# Patient Record
Sex: FEMALE | Race: WHITE | NOT HISPANIC OR LATINO | Employment: OTHER | URBAN - METROPOLITAN AREA
[De-identification: names, ages, dates, MRNs, and addresses within clinical notes are randomized per-mention and may not be internally consistent; named-entity substitution may affect disease eponyms.]

---

## 2017-01-05 ENCOUNTER — ALLSCRIPTS OFFICE VISIT (OUTPATIENT)
Dept: OTHER | Facility: OTHER | Age: 62
End: 2017-01-05

## 2017-01-05 DIAGNOSIS — K21.9 GASTRO-ESOPHAGEAL REFLUX DISEASE WITHOUT ESOPHAGITIS: ICD-10-CM

## 2017-01-05 DIAGNOSIS — E78.5 HYPERLIPIDEMIA: ICD-10-CM

## 2017-01-05 DIAGNOSIS — I10 ESSENTIAL (PRIMARY) HYPERTENSION: ICD-10-CM

## 2017-07-06 ENCOUNTER — ALLSCRIPTS OFFICE VISIT (OUTPATIENT)
Dept: OTHER | Facility: OTHER | Age: 62
End: 2017-07-06

## 2017-08-01 ENCOUNTER — GENERIC CONVERSION - ENCOUNTER (OUTPATIENT)
Dept: OTHER | Facility: OTHER | Age: 62
End: 2017-08-01

## 2017-08-07 ENCOUNTER — GENERIC CONVERSION - ENCOUNTER (OUTPATIENT)
Dept: OTHER | Facility: OTHER | Age: 62
End: 2017-08-07

## 2017-08-16 ENCOUNTER — HOSPITAL ENCOUNTER (OUTPATIENT)
Dept: RADIOLOGY | Age: 62
Discharge: HOME | End: 2017-08-16
Payer: COMMERCIAL

## 2017-08-16 DIAGNOSIS — Z12.31 ENCOUNTER FOR SCREENING MAMMOGRAM FOR MALIGNANT NEOPLASM OF BREAST: ICD-10-CM

## 2017-08-16 PROCEDURE — G0202 SCR MAMMO BI INCL CAD: HCPCS

## 2017-08-23 ENCOUNTER — HOSPITAL ENCOUNTER (OUTPATIENT)
Dept: MAMMOGRAPHY | Facility: CLINIC | Age: 62
Discharge: HOME | End: 2017-08-23
Payer: COMMERCIAL

## 2017-08-23 DIAGNOSIS — R92.0 ABNORMAL FINDING ON MAMMOGRAPHY, MICROCALCIFICATION: ICD-10-CM

## 2017-08-23 PROCEDURE — G0206 DX MAMMO INCL CAD UNI: HCPCS

## 2017-08-24 ENCOUNTER — TRANSCRIBE ORDERS (OUTPATIENT)
Dept: ADMINISTRATIVE | Facility: HOSPITAL | Age: 62
End: 2017-08-24

## 2017-08-24 DIAGNOSIS — Z09 FOLLOW-UP EXAM, 3-6 MONTHS SINCE PREVIOUS EXAM: Primary | ICD-10-CM

## 2017-09-16 ENCOUNTER — ALLSCRIPTS OFFICE VISIT (OUTPATIENT)
Dept: OTHER | Facility: OTHER | Age: 62
End: 2017-09-16

## 2017-10-06 ENCOUNTER — HOSPITAL ENCOUNTER (OUTPATIENT)
Dept: RADIOLOGY | Facility: HOSPITAL | Age: 62
Discharge: HOME | End: 2017-10-06
Payer: COMMERCIAL

## 2017-10-06 ENCOUNTER — GENERIC CONVERSION - ENCOUNTER (OUTPATIENT)
Dept: OTHER | Facility: OTHER | Age: 62
End: 2017-10-06

## 2017-10-06 ENCOUNTER — TRANSCRIBE ORDERS (OUTPATIENT)
Dept: RADIOLOGY | Facility: HOSPITAL | Age: 62
End: 2017-10-06

## 2017-10-06 ENCOUNTER — APPOINTMENT (OUTPATIENT)
Dept: LAB | Facility: HOSPITAL | Age: 62
End: 2017-10-06
Payer: COMMERCIAL

## 2017-10-06 ENCOUNTER — ALLSCRIPTS OFFICE VISIT (OUTPATIENT)
Dept: OTHER | Facility: OTHER | Age: 62
End: 2017-10-06

## 2017-10-06 DIAGNOSIS — R11.0 NAUSEA: ICD-10-CM

## 2017-10-06 DIAGNOSIS — M54.9 DORSALGIA: ICD-10-CM

## 2017-10-06 DIAGNOSIS — R39.9 UNSPECIFIED SYMPTOMS AND SIGNS INVOLVING THE GENITOURINARY SYSTEM: ICD-10-CM

## 2017-10-06 LAB
BACTERIA UR QL AUTO: ABNORMAL /HPF
BILIRUB UR QL STRIP: NEGATIVE
BILIRUB UR QL STRIP: NORMAL
CLARITY UR: CLEAR
CLARITY UR: NORMAL
COLOR UR: YELLOW
COLOR UR: YELLOW
GLUCOSE (HISTORICAL): NORMAL
GLUCOSE UR STRIP-MCNC: NEGATIVE MG/DL
HGB UR QL STRIP.AUTO: NEGATIVE
HGB UR QL STRIP.AUTO: NORMAL
HYALINE CASTS #/AREA URNS LPF: ABNORMAL /LPF
KETONES UR STRIP-MCNC: NEGATIVE MG/DL
KETONES UR STRIP-MCNC: NORMAL MG/DL
LEUKOCYTE ESTERASE UR QL STRIP: NEGATIVE
LEUKOCYTE ESTERASE UR QL STRIP: NORMAL
NITRITE UR QL STRIP: NEGATIVE
NITRITE UR QL STRIP: NORMAL
NON-SQ EPI CELLS URNS QL MICRO: ABNORMAL /HPF
PH UR STRIP.AUTO: 0.7 [PH]
PH UR STRIP.AUTO: 7.5 [PH] (ref 4.5–8)
PROT UR STRIP-MCNC: NEGATIVE MG/DL
PROT UR STRIP-MCNC: NORMAL MG/DL
RBC #/AREA URNS AUTO: ABNORMAL /HPF
SP GR UR STRIP.AUTO: 1
SP GR UR STRIP.AUTO: 1.01 (ref 1–1.03)
UROBILINOGEN UR QL STRIP.AUTO: 0.2
UROBILINOGEN UR QL STRIP.AUTO: 0.2 E.U./DL
WBC #/AREA URNS AUTO: ABNORMAL /HPF

## 2017-10-06 PROCEDURE — 81001 URINALYSIS AUTO W/SCOPE: CPT

## 2017-10-06 PROCEDURE — 87086 URINE CULTURE/COLONY COUNT: CPT

## 2017-10-06 PROCEDURE — 72072 X-RAY EXAM THORAC SPINE 3VWS: CPT

## 2017-10-06 PROCEDURE — 71020 HB CHEST X-RAY 2VW FRONTAL&LATL: CPT

## 2017-10-06 PROCEDURE — 76705 ECHO EXAM OF ABDOMEN: CPT

## 2017-10-07 LAB — BACTERIA UR CULT: NORMAL

## 2017-10-09 ENCOUNTER — GENERIC CONVERSION - ENCOUNTER (OUTPATIENT)
Dept: OTHER | Facility: OTHER | Age: 62
End: 2017-10-09

## 2017-10-09 NOTE — PROGRESS NOTES
Assessment  1  Pain of mid back (724 5) (M54 9)   2  Nausea (787 02) (R11 0)    Plan   Pain of mid back    · Methocarbamol 750 MG Oral Tablet; TAKE 1 TABLET 3 times daily PRN muscle  spasm   · (1) URINALYSIS WITH MICROSCOPIC; Status:Resulted - Requires Verification;   Done:  04NSL0880 08:22PM   · (1) URINE CULTURE; Source:Urine, Clean Catch; Status:Resulted - Requires  Verification;   Done: 26IPL3924 08:22PM   · * XR CHEST PA & LATERAL; Status:Active; Requested for:06Oct2017;    · * XR SPINE THORACIC 3 VIEW; Status:Active; Requested for:06Oct2017;     US RIGHT UPPER QUADRANT; Status:Resulted - Requires Verification;   Done: 82UVT3645 12:00AM  Order Comments:PLEASE EVALUATE  RIGHT KIDNEY as well; Due:06Oct2017;Ordered; Stat;    For:Nausea, Pain of mid back; Ordered By:Helene Valdes; Discussion/Summary    Patient presents for evaluation of right mid back discomfort  are suspicious for mid back strain  pain is worse with deep breathing, sneezing, turning  is relieved by stretching  felt nauseated  History of acid reflux, fatty liver  Mild right flank tenderness and right upper quadrant discomfort on exam will proceed with stat right upper quadrant ultrasound along with UA C&S, chest x-ray and thoracic spine x-ray  rash on exam, I advised patient  to observe, cannot exclude possibility of shingles even though her symptoms started a week ago and no suspicious rashes so far  advised patient to continue the same dose of PPI  will try muscle relaxant for the next few days  pending updates and diagnostic results  The patient, patient's family was counseled regarding instructions for management,-impressions  Possible side effects of new medications were reviewed with the patient/guardian today  The treatment plan was reviewed with the patient/guardian  The patient/guardian understands and agrees with the treatment plan      Chief Complaint  Patient is here for acute   Patient has c/o back pain since 1 week ago  All medications were reviewed and updated today with the patient  History of Present Illness  HPI: developed mid back pain a week ago , no injury/fall or heavy lifting   Discomfort is worse with deep breathing   Tried extra acid reducer - no relief is better with standing and stretching   worse with sneezing , or taking deep breath  no SOB, no cough, no wheezing , no fever  no unusual abdominal s/o / no pain  Felt nauseated   occ  right flank discomfort   no dysuria, chronic urinary frequency , no gross hematuriarash      Back Pain, Thoracic, Acute (Brief): The patient is being seen for an initial evaluation of acute thoracic back pain  Symptoms:  pain on inspiration, but-no decreased range of motion,-no leg numbness,-no leg weakness-and-no lower extremity paresthesias-   The patient presents with complaints of gradual onset of intermittent episodes of moderate thoracic back pain  Episodes started 1 week ago  She is currently experiencing thoracic back pain  Symptoms are unchanged  The patient is currently experiencing symptoms  Associated symptoms:  malaise, but-no fever,-no abdominal pain,-no chest pain,-no weight loss,-no urinary incontinence,-no fecal incontinence,-no urinary retention,-no arm numbness,-no arm weakness,-no upper extremity paresthesias-and-no thoracic rash  Current treatment includes activity modification  Review of Systems    Constitutional: No fever, no chills, feels well, no tiredness, no recent weight gain or loss  ENT: no ear ache, no loss of hearing, no nosebleeds or nasal discharge, no sore throat or hoarseness  Cardiovascular: no complaints of slow or fast heart rate, no chest pain, no palpitations, no leg claudication or lower extremity edema  Respiratory: no complaints of shortness of breath, no wheezing, no dyspnea on exertion, no orthopnea or PND  Gastrointestinal: nausea  Genitourinary: as noted in HPI  Musculoskeletal: mid back pain     Integumentary: no complaints of skin rash or lesion, no itching or dry skin, no skin wounds  Neurological: no complaints of headache, no confusion, no numbness or tingling, no dizziness or fainting  Active Problems  1  Acid reflux disease (530 81) (K21 9)   2  Benign essential hypertension (401 1) (I10)   3  Bursitis (727 3) (M71 9)   4  Depression with anxiety (300 4) (F41 8)   5  Dermatitis (692 9) (L30 9)   6  Dyslipidemia (272 4) (E78 5)   7  Dyspepsia (536 8) (K30)   8  Fatigue (780 79) (R53 83)   9  Hematoma and contusion (924 9) (T14 8XXA)   10  Hiatal hernia (553 3) (K44 9)   11  History of allergy (V15 09) (Z88 9)   12  Hypokalemia (276 8) (E87 6)   13  Insomnia (780 52) (G47 00)   14  Irritable bowel syndrome (564 1) (K58 9)   15  Joint pain, knee (719 46) (M25 569)   16  Lateral epicondylitis, unspecified laterality (726 32) (M77 10)   17  Need for prophylactic vaccination and inoculation against influenza (V04 81) (Z23)   18  Pain in left ear (388 70) (H92 02)   19  Pain, upper back (724 5) (M54 9)   20  Right inguinal pain (789 09) (R10 31)   21  Seasonal allergies (477 9) (J30 2)   22  Sinusitis (473 9) (J32 9)   23  Symptomatic menopausal or female climacteric states (627 2) (N95 1)    Past Medical History  1  History of chest pain (V13 89) (Z87 898)   2  History of colonic polyps (V12 72) (Z86 010)   3  History of Visit for routine gyn exam (V72 31) (Z01 419)   4  History of Visit for screening mammogram (O92 32) (Z12 31)  Active Problems And Past Medical History Reviewed: The active problems and past medical history were reviewed and updated today  Family History  Mother    1  Family history of Diabetes Mellitus (V18 0)   2  Family history of Heart Disease (V17 49)   3  Family history of Osteoporosis (V17 81)  Father    4  Family history of Heart Disease (V17 49)  Family History    5  Family history of Cancer  Family History Reviewed: The family history was reviewed and updated today         Social History   · Being A Social Drinker   ·    · Never a smoker   · Never A Smoker  The social history was reviewed and updated today  Surgical History  1  History of Colonoscopy   2  History of Esophagogastric Fundoplasty Nissen Fundoplication   3  History of Foot Surgery  Surgical History Reviewed: The surgical history was reviewed and updated today  Current Meds   1  Citracal Plus Oral Tablet; Take 1 tablet twice daily; Therapy: (Recorded:91Khc0705) to Recorded   2  [employer] Stool Softener 100 MG Oral Capsule; TAKE 1 CAPSULE Daily; Therapy: (Recorded:85Afb1898) to Recorded   3  Estradiol 0 5 MG Oral Tablet; TAKE 1 TABLET DAILY; Therapy: (Recorded:52Piy7409) to Recorded   4  Flaxseed Oil 1200 MG Oral Capsule; take 1 capsule daily; Therapy: 75WVE2524 to Recorded   5  Fluticasone Propionate 50 MCG/ACT Nasal Suspension; USE 2 SPRAYS IN EACH   NOSTRIL ONCE DAILY  Requested for: 97UCN4957; Last Rx:08Jan2016 Ordered   6  Metoprolol Succinate ER 25 MG Oral Tablet Extended Release 24 Hour; Take 1 tablet   daily; Therapy: 43HYO5413 to (Evaluate:07Xwz1661)  Requested for: 73YDE6578; Last   Rx:05Jan2017 Ordered   7  Multiple Vitamin TABS; Take 1 tablet daily; Therapy: (Recorded:59Hhw3073) to Recorded   8  [facility] Colon Health Oral Capsule; Therapy: (Recorded:05Jan2017) to Recorded   9  Proctosol HC 2 5 % Rectal Cream; USE AS DIRECTED; Therapy: 60XUZ0061 to (Last Rx:04Oct2016)  Requested for: 04Oct2016 Ordered   10  Provera 2 5 MG Oral Tablet; Therapy: (Recorded:92Rzi8879) to Recorded   11  RABEprazole Sodium 20 MG Oral Tablet Delayed Release; TAKE ONE (1) TABLET(S)    DAILY    [facility] MFR;    Therapy: 17ELX2972 to (Last Rx:05Jan2017)  Requested for: 12TZZ2148 Ordered   12  Vitamin D 1000 UNIT Oral Tablet; Take 1 tablet daily; Therapy: (Recorded:37Bgd3198) to Recorded   13  Zinc 50 MG CAPS; Therapy: (Recorded:05Jan2017) to Recorded   14   Zolpidem Tartrate 10 MG Oral Tablet; take 1/2 or 1 tablet as needed at bedtime for    insomnia; Therapy: 78QLQ3829 to (Last Rx:48Hrf7752) Ordered    The medication list was reviewed and updated today  Allergies  1  ACE Inhibitors   2  Angiotensin Receptor Blockers    Vitals   Recorded: 06KBD6114 11:34AM   Temperature 98 2 F   Heart Rate 76   Respiration 18   Systolic 406   Diastolic 316   Height 5 ft 6 in   Weight 188 lb    BMI Calculated 30 34   BSA Calculated 1 95     Physical Exam    Constitutional   General appearance: No acute distress, well appearing and well nourished  Pulmonary   Respiratory effort: No increased work of breathing or signs of respiratory distress  Auscultation of lungs: Clear to auscultation  Cardiovascular   Auscultation of heart: Normal rate and rhythm, normal S1 and S2, without murmurs  Abdomen   Abdomen: Abnormal   The abdomen was rounded-and-obese  Bowel sounds were normal  There was mild tenderness in the right upper quadrant  The abdomen was not firm-and-not rigid  No guarding  right CVA tenderness -no abdominal bruits  Liver and spleen: No hepatomegaly or splenomegaly  Musculoskeletal   Gait and station: Normal     Neurologic   Cranial nerves: Cranial nerves 2-12 intact  Psychiatric   Orientation to person, place, and time: Normal     Mood and affect: Abnormal   Mood and Affect: anxious  Additional Exam:  trapezius spasm on the right          Results/Data  (1) URINALYSIS WITH MICROSCOPIC 06Oct2017 08:22PM Minor Pu Order Number: [id number]     Test Name Result Flag Reference   COLOR Yellow     CLARITY Clear     PH UA 7 5  4 5-8 0   LEUKOCYTE ESTERASE UA Negative  Negative   NITRITE UA Negative  Negative   PROTEIN UA Negative mg/dl  Negative   GLUCOSE UA Negative mg/dl  Negative   KETONES UA Negative mg/dl  Negative   UROBILINOGEN UA 0 2 E U /dl  0 2, 1 0 E U /dl   BILIRUBIN UA Negative  Negative   BLOOD UA Negative  Negative   SPECIFIC GRAVITY UA 1 009  1 003-1 030   WBC UA None Seen /hpf  None Seen, 0-5, 5-55, 5-65   RBC UA 10-20 /hpf A None Seen, 0-5   HYALINE CASTS None Seen /lpf  None Seen   BACTERIA None Seen /hpf  None Seen, Occasional   EPITHELIAL CELLS None Seen /hpf  None Seen, Occasional     (1) URINE CULTURE 06Oct2017 08:22PM Makenzie Terrell    Order Number: [id number]     Test Name Result Flag Reference   CLINICAL REPORT (Report)     Test:        Urine culture  Specimen Type:   Urine  Specimen Date:   10/6/2017 8:22 PM  Result Date:    10/7/2017 7:21 PM  Result Status:   Final result  Resulting Lab:   [facility] [address] [place] [address]            Tel: [phone]      CULTURE                                       ------------------                                   20,000-29,000 cfu/ml      *** Mixed Contaminants X3 ***       Signatures   Electronically signed by :  Shelle Halsted, M D ; Oct  8 2017 11:22AM EST                       (Author)

## 2017-10-10 ENCOUNTER — ALLSCRIPTS OFFICE VISIT (OUTPATIENT)
Dept: OTHER | Facility: OTHER | Age: 62
End: 2017-10-10

## 2017-10-10 LAB
BILIRUB UR QL STRIP: NORMAL
CLARITY UR: NORMAL
COLOR UR: YELLOW
GLUCOSE (HISTORICAL): NORMAL
HGB UR QL STRIP.AUTO: NORMAL
KETONES UR STRIP-MCNC: NORMAL MG/DL
LEUKOCYTE ESTERASE UR QL STRIP: NORMAL
NITRITE UR QL STRIP: NORMAL
PH UR STRIP.AUTO: 0.5 [PH]
PROT UR STRIP-MCNC: NORMAL MG/DL
SP GR UR STRIP.AUTO: 10.25
UROBILINOGEN UR QL STRIP.AUTO: 0.2

## 2017-10-11 ENCOUNTER — APPOINTMENT (OUTPATIENT)
Dept: LAB | Facility: HOSPITAL | Age: 62
End: 2017-10-11
Payer: COMMERCIAL

## 2017-10-11 DIAGNOSIS — R39.9 UNSPECIFIED SYMPTOMS AND SIGNS INVOLVING THE GENITOURINARY SYSTEM: ICD-10-CM

## 2017-10-11 LAB
BACTERIA UR QL AUTO: ABNORMAL /HPF
BILIRUB UR QL STRIP: NEGATIVE
CAOX CRY URNS QL MICRO: ABNORMAL /HPF
CLARITY UR: ABNORMAL
COLOR UR: YELLOW
GLUCOSE UR STRIP-MCNC: NEGATIVE MG/DL
HGB UR QL STRIP.AUTO: NEGATIVE
KETONES UR STRIP-MCNC: NEGATIVE MG/DL
LEUKOCYTE ESTERASE UR QL STRIP: NEGATIVE
NITRITE UR QL STRIP: NEGATIVE
NON-SQ EPI CELLS URNS QL MICRO: ABNORMAL /HPF
PH UR STRIP.AUTO: 6 [PH] (ref 4.5–8)
PROT UR STRIP-MCNC: NEGATIVE MG/DL
RBC #/AREA URNS AUTO: ABNORMAL /HPF
SP GR UR STRIP.AUTO: 1.02 (ref 1–1.03)
UROBILINOGEN UR QL STRIP.AUTO: 0.2 E.U./DL
WBC #/AREA URNS AUTO: ABNORMAL /HPF

## 2017-10-11 PROCEDURE — 81001 URINALYSIS AUTO W/SCOPE: CPT

## 2017-11-28 ENCOUNTER — TRANSCRIBE ORDERS (OUTPATIENT)
Dept: ADMINISTRATIVE | Age: 62
End: 2017-11-28

## 2017-11-28 ENCOUNTER — APPOINTMENT (OUTPATIENT)
Dept: RADIOLOGY | Age: 62
End: 2017-11-28
Payer: COMMERCIAL

## 2017-11-28 DIAGNOSIS — G89.29 CHRONIC RIGHT SI JOINT PAIN: Primary | ICD-10-CM

## 2017-11-28 DIAGNOSIS — G89.29 CHRONIC RIGHT SI JOINT PAIN: ICD-10-CM

## 2017-11-28 DIAGNOSIS — M53.3 CHRONIC RIGHT SI JOINT PAIN: ICD-10-CM

## 2017-11-28 DIAGNOSIS — M53.3 CHRONIC RIGHT SI JOINT PAIN: Primary | ICD-10-CM

## 2017-11-28 PROCEDURE — 72110 X-RAY EXAM L-2 SPINE 4/>VWS: CPT

## 2017-12-04 ENCOUNTER — ALLSCRIPTS OFFICE VISIT (OUTPATIENT)
Dept: OTHER | Facility: OTHER | Age: 62
End: 2017-12-04

## 2017-12-06 NOTE — PROGRESS NOTES
Assessment    1  Sinusitis (473 9) (J32 9)    Plan  Sinusitis    · Azithromycin 250 MG Oral Tablet; TAKE 2 TABLETS BY MOUTH ON DAY 1, THENTAKE 1 TABLET BY MOUTH DAILY FOR 4 DAYS   · Benzonatate 200 MG Oral Capsule; TAKE 1 CAPSULE 3 times daily PRN cough    Discussion/Summary    Patient presents for evaluation of acute sinusitis  Will treat with Zithromax and cough medication as needed  Advised rest, fluids, gurgling  Patient will contact me in a few days if her symptoms are not improving significantly  The patient was counseled regarding instructions for management,-- impressions  Possible side effects of new medications were reviewed with the patient/guardian today  The treatment plan was reviewed with the patient/guardian  The patient/guardian understands and agrees with the treatment plan      Chief Complaint  Pt is c/o congestion, sore throat, cough and pt states that her ears have been bothering  Pt states that she has been using Mucinex DM which helped greatly, but Saturday started with shooting pain in the throat to her ears  Pt states that this began the Friday after Thanksgiving  History of Present Illness  HPI: cold s/o for a weekcontact: 2 y old g/daughter with URI s/o  Patient is complaining of ST, postnasal drip , hoarseness  felt better by late last week, but symptoms have worsened over the weekend  now c/o ST and left submandibular painMucinex DMis worse at night  no fever  Recent diagnosis [address] [place] [address] Blvd- above right eye, Dr Zapata -pending Mohs- 12/15/2017      Review of Systems   Constitutional: feeling tired, but-- no fever  ENT: sore throat,-- nasal discharge-- and-- hoarseness, but-- as noted in HPI  Cardiovascular: no complaints of slow or fast heart rate, no chest pain, no palpitations, no leg claudication or lower extremity edema  Respiratory: cough, but-- no shortness of breath-- and-- no wheezing  Integumentary: as noted in HPI    Neurological: no complaints of headache, no confusion, no numbness or tingling, no dizziness or fainting  Active Problems  1  Acid reflux disease (530 81) (K21 9)   2  Benign essential hypertension (401 1) (I10)   3  Bursitis (727 3) (M71 9)   4  Depression with anxiety (300 4) (F41 8)   5  Dermatitis (692 9) (L30 9)   6  Dyslipidemia (272 4) (E78 5)   7  Dyspepsia (536 8) (K30)   8  Fatigue (780 79) (R53 83)   9  Hematoma and contusion (924 9) (T14 8XXA)   10  Hiatal hernia (553 3) (K44 9)   11  History of allergy (V15 09) (Z88 9)   12  Hypokalemia (276 8) (E87 6)   13  Insomnia (780 52) (G47 00)   14  Irritable bowel syndrome (564 1) (K58 9)   15  Joint pain, knee (719 46) (M25 569)   16  Lateral epicondylitis, unspecified laterality (726 32) (M77 10)   17  Nausea (787 02) (R11 0)   18  Need for prophylactic vaccination and inoculation against influenza (V04 81) (Z23)   19  Pain in left ear (388 70) (H92 02)   20  Pain of mid back (724 5) (M54 9)   21  Pain, upper back (724 5) (M54 9)   22  Right inguinal pain (789 09) (R10 31)   23  Seasonal allergies (477 9) (J30 2)   24  Sinusitis (473 9) (J32 9)   25  Symptomatic menopausal or female climacteric states (627 2) (N95 1)   26  UTI symptoms (788 99) (R39 9)    Past Medical History  1  History of chest pain (V13 89) (Z87 898)   2  History of colonic polyps (V12 72) (Z86 010)   3  History of Visit for routine gyn exam (V72 31) (Z01 419)   4  History of Visit for screening mammogram (J06 96) (Z12 31)  Active Problems And Past Medical History Reviewed: The active problems and past medical history were reviewed and updated today  Family History  Mother    1  Family history of Diabetes Mellitus (V18 0)   2  Family history of Heart Disease (V17 49)   3  Family history of Osteoporosis (V17 81)  Father    4  Family history of Heart Disease (V17 49)  Family History    5  Family history of Cancer  Family History Reviewed: The family history was reviewed and updated today         Social History   · Being A Social Drinker   ·    · Never a smoker   · Never A Smoker  The social history was reviewed and updated today  Surgical History    1  History of Colonoscopy   2  History of Esophagogastric Fundoplasty Nissen Fundoplication   3  History of Foot Surgery  Surgical History Reviewed: The surgical history was reviewed and updated today  Current Meds   1  Citracal Plus Oral Tablet; Take 1 tablet twice daily; Therapy: (Recorded:63Ukd1071) to Recorded   2  [employer] Stool Softener 100 MG Oral Capsule; TAKE 1 CAPSULE Daily; Therapy: (Recorded:08Azv6720) to Recorded   3  Estradiol 0 5 MG Oral Tablet; TAKE 1 TABLET DAILY; Therapy: (Recorded:57Pkm0618) to Recorded   4  Flaxseed Oil 1200 MG Oral Capsule; take 1 capsule daily; Therapy: 97ACZ6257 to Recorded   5  Fluticasone Propionate 50 MCG/ACT Nasal Suspension; USE 2 SPRAYS IN EACH NOSTRIL ONCE DAILY  Requested for: 09AXY7608; Last Rx:08Jan2016 Ordered   6  Methocarbamol 750 MG Oral Tablet; TAKE 1 TABLET 3 times daily PRN muscle spasm; Therapy: 08DZZ4596 to (03 17 74 30 53)  Requested for: 04ZKM3509; Last Rx:06Oct2017 Ordered   7  Metoprolol Succinate ER 25 MG Oral Tablet Extended Release 24 Hour; Take 1 tablet daily; Therapy: 40ORF0428 to (Evaluate:02Mwo9671)  Requested for: 21TMU2308; Last Rx:05Jan2017 Ordered   8  Multiple Vitamin TABS; Take 1 tablet daily; Therapy: (Recorded:12Oyz6863) to Recorded   9  [facility] Colon Health Oral Capsule; Therapy: (Recorded:05Jan2017) to Recorded   10  Proctosol HC 2 5 % Rectal Cream; USE AS DIRECTED; Therapy: 37FNN8835 to (Last Rx:04Oct2016)  Requested for: 04Oct2016 Ordered   11  Provera 2 5 MG Oral Tablet; Therapy: (Recorded:46Hks0639) to Recorded   12  RABEprazole Sodium 20 MG Oral Tablet Delayed Release; TAKE ONE (1) TABLET(S)  DAILY  [facility] MFR;  Therapy: 23KXV6639 to (Last Rx:05Jan2017)  Requested for: 10VSB8214 Ordered   13  Vitamin D 1000 UNIT Oral Tablet; Take 1 tablet daily;   Therapy: (Recorded:45Gdw8886) to Recorded   14  Zinc 50 MG CAPS; Therapy: (Recorded:05Jan2017) to Recorded   15  Zolpidem Tartrate 10 MG Oral Tablet; take 1/2 or 1 tablet as needed at bedtime for  insomnia; Therapy: 02QWJ8798 to (Last Rx:16Sep2017) Ordered    The medication list was reviewed and updated today  Allergies  1  ACE Inhibitors   2  Angiotensin Receptor Blockers    Vitals   Recorded: 99RIU4618 01:41PM   Temperature 98 F   Heart Rate 80   Systolic 641   Diastolic 80   Height 5 ft 6 in   Weight 190 lb 4 oz   BMI Calculated 30 71   BSA Calculated 1 96       Physical Exam   Constitutional  General appearance: No acute distress, well appearing and well nourished  Ears, Nose, Mouth, and Throat  Otoscopic examination: Tympanic membranes translucent with normal light reflex  Canals patent without erythema  Nasal mucosa, septum, and turbinates: Abnormal   There was a mucoid discharge from both nares  The bilateral nasal mucosa was boggy,-- edematous-- and-- red  Oropharynx: Abnormal  -- Erythema, postnasal drip, no exudate  Pulmonary  Respiratory effort: No increased work of breathing or signs of respiratory distress  Auscultation of lungs: Clear to auscultation  Cardiovascular  Auscultation of heart: Normal rate and rhythm, normal S1 and S2, without murmurs  Lymphatic  Palpation of lymph nodes in neck: Abnormal   left 1 5 cm tender,-- mobile-- and-- rubbery, but-- non-erythematous-- and-- soft submandibular node enlargement  Neurologic  Cranial nerves: Cranial nerves 2-12 intact  Psychiatric  Orientation to person, place, and time: Normal    Mood and affect: Normal          Signatures   Electronically signed by :  BECCA Ramírez ; Dec  5 2017  6:24AM EST                       (Author)

## 2018-01-11 NOTE — PROGRESS NOTES
Chief Complaint  Patient is here to receive the influenza vaccination  Active Problems    1  Acid reflux disease (530 81) (K21 9)   2  Benign essential hypertension (401 1) (I10)   3  Bursitis (727 3) (M71 9)   4  Depression with anxiety (300 4) (F41 8)   5  Dermatitis (692 9) (L30 9)   6  Dyslipidemia (272 4) (E78 5)   7  Dyspepsia (536 8) (K30)   8  Fatigue (780 79) (R53 83)   9  Hematoma and contusion (924 9) (T14 8)   10  Hiatal hernia (553 3) (K44 9)   11  History of allergy (V15 09) (Z88 9)   12  Hypokalemia (276 8) (E87 6)   13  Insomnia (780 52) (G47 00)   14  Irritable bowel syndrome (564 1) (K58 9)   15  Joint pain, knee (719 46) (M25 569)   16  Lateral epicondylitis, unspecified laterality (726 32) (M77 10)   17  Need for prophylactic vaccination and inoculation against influenza (V04 81) (Z23)   18  Pain in left ear (388 70) (H92 02)   19  Pain, upper back (724 5) (M54 9)   20  Right inguinal pain (789 09) (R10 31)   21  Seasonal allergies (477 9) (J30 2)   22  Sinusitis (473 9) (J32 9)   23  Symptomatic menopausal or female climacteric states (627 2) (N95 1)    Current Meds   1  Citracal Plus Oral Tablet; Take 1 tablet twice daily; Therapy: (Recorded:99Ujw3071) to Recorded   2  [employer] Stool Softener 100 MG Oral Capsule; TAKE 1 CAPSULE Daily; Therapy: (Recorded:64Vgh7070) to Recorded   3  Estradiol 0 5 MG Oral Tablet; TAKE 1 TABLET DAILY; Therapy: (Recorded:86Vli4931) to Recorded   4  Flaxseed Oil 1200 MG Oral Capsule; take 1 capsule daily; Therapy: 98TXU0958 to Recorded   5  Fluticasone Propionate 50 MCG/ACT Nasal Suspension; USE 2 SPRAYS IN EACH   NOSTRIL ONCE DAILY  Requested for: 52JDB4199; Last Rx:08Jan2016 Ordered   6  Metoprolol Succinate ER 25 MG Oral Tablet Extended Release 24 Hour; Take 1 tablet   daily; Therapy: 40YCA8910 to (Evaluate:23Ssl9403)  Requested for: 83KER7545; Last   Rx:05Jan2017 Ordered   7  Multiple Vitamin TABS; Take 1 tablet daily;    Therapy: (Recorded:38Llq4932) to Recorded   8  [facility] Colon Health Oral Capsule; Therapy: (Recorded:05Jan2017) to Recorded   9  Proctosol HC 2 5 % Rectal Cream; USE AS DIRECTED; Therapy: 11BFK4971 to (Last Rx:04Oct2016)  Requested for: 04Oct2016 Ordered   10  Provera 2 5 MG Oral Tablet; Therapy: (Recorded:61Ogc1221) to Recorded   11  RABEprazole Sodium 20 MG Oral Tablet Delayed Release; TAKE ONE (1) TABLET(S)    DAILY    [facility] MFR;    Therapy: 83TDY8292 to (Last Rx:05Jan2017)  Requested for: 48XTW1476 Ordered   12  Vitamin D 1000 UNIT Oral Tablet; Take 1 tablet daily; Therapy: (Recorded:69Vrb2253) to Recorded   13  Zinc 50 MG CAPS; Therapy: (Recorded:05Jan2017) to Recorded   14  Zolpidem Tartrate 10 MG Oral Tablet; take 1/2 or 1 tablet as needed at bedtime for    insomnia; Therapy: 80NHO7492 to (Last Rx:16Sep2017) Ordered    Allergies    1  ACE Inhibitors   2  Angiotensin Receptor Blockers    Vitals  Signs    Temperature: 97 6 F    Plan  Need for prophylactic vaccination and inoculation against influenza    · Fluzone Quadrivalent 0 5 ML Intramuscular Suspension Prefilled Syringe    Signatures   Electronically signed by :  BECCA Hidalgo ; Sep 16 2017  4:57PM EST                       (Author)

## 2018-01-12 VITALS
HEIGHT: 66 IN | HEART RATE: 76 BPM | RESPIRATION RATE: 18 BRPM | BODY MASS INDEX: 30.22 KG/M2 | DIASTOLIC BLOOD PRESSURE: 110 MMHG | TEMPERATURE: 98.2 F | SYSTOLIC BLOOD PRESSURE: 156 MMHG | WEIGHT: 188 LBS

## 2018-01-12 NOTE — PROGRESS NOTES
Chief Complaint  Patient is here to receive the influenza vaccination  Active Problems    1  Acid reflux disease (530 81) (K21 9)   2  Benign essential hypertension (401 1) (I10)   3  Bursitis (727 3) (M71 9)   4  Chest pain (786 50) (R07 9)   5  Depression with anxiety (300 4) (F41 8)   6  Dermatitis (692 9) (L30 9)   7  Dyslipidemia (272 4) (E78 5)   8  Dyspepsia (536 8) (K30)   9  Fatigue (780 79) (R53 83)   10  Hematoma and contusion (924 9) (T14 8)   11  Hiatal hernia (553 3) (K44 9)   12  History of allergy (V15 09) (Z88 9)   13  Hypokalemia (276 8) (E87 6)   14  Insomnia (780 52) (G47 00)   15  Irritable bowel syndrome (564 1) (K58 9)   16  Joint pain, knee (719 46) (M25 569)   17  Lateral epicondylitis, unspecified laterality (726 32) (M77 10)   18  Pain in left ear (388 70) (H92 02)   19  Pain, upper back (724 5) (M54 9)   20  Right inguinal pain (789 09) (R10 30)   21  Seasonal allergies (477 9) (J30 2)   22  Sinusitis (473 9) (J32 9)   23  Upper respiratory infection (465 9) (J06 9)    Current Meds   1  Citracal Plus Oral Tablet; Take 1 tablet twice daily; Therapy: (Recorded:20Oar6681) to Recorded   2  [employer] Stool Softener 100 MG Oral Capsule; TAKE 1 CAPSULE Daily; Therapy: (Recorded:41Oix0552) to Recorded   3  Fish Oil 1200 MG Oral Capsule; Take as directed; Therapy: (Recorded:83Qpb3921) to Recorded   4  Fluticasone Propionate 50 MCG/ACT Nasal Suspension; USE 2 SPRAYS IN EACH   NOSTRIL ONCE DAILY  Requested for: 38JRQ2375; Last Rx:08Jan2016 Ordered   5  Metoprolol Succinate ER 25 MG Oral Tablet Extended Release 24 Hour; TAKE 1 TABLET   EVERY DAY; Therapy: 09Uyh3484 to (Last Rx:16Nov2015)  Requested for: 91BGU6600 Ordered   6  Metoprolol Succinate ER 25 MG Oral Tablet Extended Release 24 Hour; TAKE 1 TABLET   EVERY DAY; Therapy: 84DCJ7810 to (Evaluate:18Jan2016); Last WL:38IXE8857 Ordered   7  Multiple Vitamin TABS; Take 1 tablet daily; Therapy: (Recorded:20Fwy4331) to Recorded   8  Prempro 0 3-1 5 MG Oral Tablet; Therapy: (Recorded:71Osd3685) to Recorded   9  Proctosol HC 2 5 % Rectal Cream; USE AS DIRECTED; Therapy: 99YFC6718 to (Last Rx:51Oxe0616)  Requested for: 04Oct2016 Ordered   10  RABEprazole Sodium 20 MG Oral Tablet Delayed Release; TAKE ONE (1) TABLET(S)    DAILY    [facility];    Therapy: 38GBS4476 to (Last KR:25BWJ4126)  Requested for: 39ITM5375 Ordered   11  Vitamin D 1000 UNIT Oral Tablet; Take 1 tablet daily; Therapy: (Recorded:83Ubf2621) to Recorded   12  Zolpidem Tartrate 10 MG Oral Tablet; take 1/2 or 1 tablet as needed at bedtime for    insomnia; Therapy: 95XCW0999 to (Last Rx:50Ana5851) Ordered   13  ZyrTEC Allergy 10 MG Oral Tablet; take 1 tablet daily as needed; Therapy: (Recorded:19Uif5907) to Recorded    Allergies    1  ACE Inhibitors   2  Angiotensin Receptor Blockers    Vitals  Signs    Temperature: 97 3 F    Plan  Need for prophylactic vaccination and inoculation against influenza    · Fluzone Quadrivalent 0 5 ML Intramuscular Suspension    Future Appointments    Date/Time Provider Specialty Site   12/06/2016 10:30 AM BECCA Moody  Family Medicine [address] [place] [address]     Signatures   Electronically signed by :  BECCA King ; Nov 14 2016  3:10PM EST                       (Author)

## 2018-01-13 VITALS
SYSTOLIC BLOOD PRESSURE: 116 MMHG | BODY MASS INDEX: 30.41 KG/M2 | RESPIRATION RATE: 16 BRPM | DIASTOLIC BLOOD PRESSURE: 84 MMHG | TEMPERATURE: 97.7 F | HEART RATE: 72 BPM | WEIGHT: 189.25 LBS | HEIGHT: 66 IN

## 2018-01-13 VITALS — TEMPERATURE: 97.6 F

## 2018-01-15 NOTE — RESULT NOTES
Verified Results  US RIGHT UPPER Walda Duty 87KLO6810 03:08PM Yamileth George Order Number: [id number]   Performing Comments: PLEASE EVALUATE  RIGHT KIDNEY as well   - Patient Instructions: To schedule this appointment, please contact Central Scheduling at [phone]  Test Name Result Flag Reference   US RIGHT UPPER QUADRANT (Report)     RIGHT UPPER QUADRANT ULTRASOUND     INDICATION: Right upper quadrant pain  COMPARISON: Ultrasound 12/13/2013     TECHNIQUE:  Real-time ultrasound of the right upper quadrant was performed with a curvilinear transducer with both volumetric sweeps and still imaging techniques  FINDINGS:     PANCREAS: Visualized portions of the pancreas are within normal limits  AORTA AND IVC: Visualized portions are normal for patient age  LIVER:   Size: Within normal range  The liver measures 15 1 cm in the midclavicular line  Contour: Surface contour is smooth  Parenchyma: Increased echogenicity suggesting fatty infiltration  No evidence of suspicious mass  Limited imaging of the main portal vein shows it to be patent and hepatopetal       BILIARY:   The gallbladder is normal in caliber  No wall thickening or pericholecystic fluid  No stones or sludge identified  No sonographic Caro's sign  No intrahepatic biliary dilatation  CBD measures 3 mm  No choledocholithiasis  KIDNEY:    Right kidney measures 11 8 x 5 6 cm  Within normal limits  ASCITES:  None  IMPRESSION:     Unremarkable gallbladder  Fatty liver  Workstation performed: [id number]     Signed by:   Jill Chan DO   10/6/17       Signatures   Electronically signed by :  Shelle Halsted, M D ; Oct  6 2017  4:48PM EST                       (Author)

## 2018-01-15 NOTE — MISCELLANEOUS
Message   Recorded as Task   Date: 10/09/2017 12:57 PM, Created By: Lynne Hollis   Task Name: Follow Up   Assigned To: Soila Valdes   Regarding Patient: Jeet Nix, Status: Active   CommentJose Zheng - 09 Oct 2017 12:57 PM     TASK CREATED  Left message for patient to call me back with an update on her symptoms  Urine culture is negative  Urinalysis results reveals 10-20 RBCs per field  I do not have results of patient's x-ray so far  Lynne Hollis - 09 Oct 2017 3:17 PM     TASK EDITED  She telephone note for details   Spoke with patient  Chest x-ray and thoracic spine x-ray is still pending  She feels significantly better after a few doses of methocarbamol  Urine culture is negative but reveals 10-20 RBCs per field  I advised patient to stop by in the office for urine recheck  She understands and agrees  Signatures   Electronically signed by :  BECCA Arellano ; Oct  9 2017  3:18PM EST                       (Author)

## 2018-01-17 NOTE — PROGRESS NOTES
Assessment    1  Benign essential hypertension (401 1) (I10)   2  Encounter for preventive health examination (V70 0) (Z00 00)   3  History of Colonoscopy   · 3/2013 - Dr Laurent - due in 5 years   4  Insomnia (780 52) (G47 00)   5  Acid reflux disease (530 81) (K21 9)    Plan  Acid reflux disease, Benign essential hypertension, Dyslipidemia    · (1) CBC/ PLT (NO DIFF); Status:Active; Requested TJE:11JLS7120;    · (1) COMPREHENSIVE METABOLIC PANEL; Status:Active; Requested FDJ:95JBA3518;    · (1) LIPID PANEL FASTING W DIRECT LDL REFLEX; Status:Active; Requested  IBA:06SWG4798;    · (1) TSH; Status:Active; Requested LEJ:98HRX3661;   Benign essential hypertension    · Metoprolol Succinate ER 25 MG Oral Tablet Extended Release 24 Hour; Take 1  tablet daily  Hiatal hernia    · RABEprazole Sodium 20 MG Oral Tablet Delayed Release (Aciphex); TAKE  ONE (1) TABLET(S) DAILY  [facility]    Discussion/Summary  health maintenance visit cervical cancer screening is current Breast cancer screening: mammogram is current  Colorectal cancer screening: colorectal cancer screening is current and colonoscopy is needed every five years  Advice and education were given regarding nutrition, aerobic exercise and weight loss  Annual well exam   Hypertension-well controlled, patient believes that blood pressure medication is contributing to her chronic insomnia  She did try multiple agents in the past   I suggested that she can hold metoprolol XL for a few days and observe her insomnia symptoms  If indeed she is sleeping better-patient should contact me and we will consider alternative remedies for hypertension  If patient insomnia will not improve with holding off beta blocker- then I would recommend to try Benadryl over-the-counter 25-50 mg as needed  I also mentioned that we could restart of SSRI , patient is reluctant    Patient is up-to-date with mammography, August 2016 and gynecological exam , November 2016   Patient is up-to-date with colonoscopy screening  Patient remains on PPI for chronic acid reflux disease  She is currently off HRT, symptoms of hot flashes overall manageable  We will proceed with blood work in May June 2017  We discussed healthy diet, weight loss  Follow-up pending updates, labs and annually  Chief Complaint  pt here for a well exam today      History of Present Illness  HM, Adult Female: The patient is being seen for a health maintenance evaluation  The last health maintenance visit was 1 year(s) ago  General Health: The patient's health since the last visit is described as good  Lifestyle:  She consumes a diverse and healthy diet  She has weight concerns  She does not exercise regularly  She does not use tobacco    Reproductive health: the patient is postmenopausal    Screening: cancer screening reviewed and updated  metabolic screening reviewed and updated  risk screening reviewed and updated  HPI: Weaned off Aneudy PRO few months ago   Patient has d/w Dr Connor - who has switched pt to Estradiol and Provera  Patient has gained 12 lbs within past few months  Admits to poor dietary choices over the holidays  Chronic symptoms of insomnia  Patient firmly believes that her insomnia symptoms has worsened with a start of treatment for hypertension  Patient believes that all blood pressure medications that she has tried did affect her ability to fall and staying asleep  She tried lisinopril, Avapro and Norvasc in the past, and is currently treated with Toprol  Patient tried valerian root capsules and melatonin without improvement  She has Ambien on hand, it helps, patient is reluctant to use it on a daily basis to avoid dependence    She states that her mind is not able to " shut down' at night, but denies symptoms of anxiety and depression per se and is reluctant to go back on Zoloft or Effexor that she has used in the past   Otherwise patient has been feeling well and stably  She remains on daily PPI for history of esophageal reflux  Patient is up-to-date with gynecological exam and mammography  Review of Systems    Constitutional: No fever, no chills, feels well, no tiredness, no recent weight gain or weight loss  Eyes: No complaints of eye pain, no red eyes, no eyesight problems, no discharge, no dry eyes, no itching of eyes  ENT: no complaints of earache, no loss of hearing, no nose bleeds, no nasal discharge, no sore throat, no hoarseness  Cardiovascular: No complaints of slow heart rate, no fast heart rate, no chest pain, no palpitations, no leg claudication, no lower extremity edema  Respiratory: No complaints of shortness of breath, no wheezing, no cough, no SOB on exertion, no orthopnea, no PND  Gastrointestinal: No complaints of abdominal pain, no constipation, no nausea or vomiting, no diarrhea, no bloody stools  Genitourinary: No complaints of dysuria, no incontinence, no pelvic pain, no dysmenorrhea, no vaginal discharge or bleeding  Musculoskeletal: No complaints of arthralgias, no myalgias, no joint swelling or stiffness, no limb pain or swelling  Integumentary: No complaints of skin rash or lesions, no itching, no skin wounds, no breast pain or lump  Neurological: No complaints of headache, no confusion, no convulsions, no numbness, no dizziness or fainting, no tingling, no limb weakness, no difficulty walking  Psychiatric: sleep disturbances  Endocrine: hot flashes  Hematologic/Lymphatic: No complaints of swollen glands, no swollen glands in the neck, does not bleed easily, does not bruise easily  Active Problems    1  Acid reflux disease (530 81) (K21 9)   2  Benign essential hypertension (401 1) (I10)   3  Bursitis (727 3) (M71 9)   4  Chest pain (786 50) (R07 9)   5  Depression with anxiety (300 4) (F41 8)   6  Dermatitis (692 9) (L30 9)   7  Dyslipidemia (272 4) (E78 5)   8  Dyspepsia (536 8) (K30)   9   Fatigue (780 79) (R53 83)   10  Hematoma and contusion (924 9) (T14 8)   11  Hiatal hernia (553 3) (K44 9)   12  History of allergy (V15 09) (Z88 9)   13  Hypokalemia (276 8) (E87 6)   14  Insomnia (780 52) (G47 00)   15  Irritable bowel syndrome (564 1) (K58 9)   16  Joint pain, knee (719 46) (M25 569)   17  Lateral epicondylitis, unspecified laterality (726 32) (M77 10)   18  Need for prophylactic vaccination and inoculation against influenza (V04 81) (Z23)   19  Pain in left ear (388 70) (H92 02)   20  Pain, upper back (724 5) (M54 9)   21  Right inguinal pain (789 09) (R10 30)   22  Seasonal allergies (477 9) (J30 2)   23  Sinusitis (473 9) (J32 9)   24  Upper respiratory infection (465 9) (J06 9)    Past Medical History    · History of colonic polyps (V12 72) (Z86 010)   · History of Visit for routine gyn exam (V72 31) (Z01 419)   · History of Visit for screening mammogram (V76 12) (Z12 31)    Surgical History    · History of Colonoscopy   · History of Esophagogastric Fundoplasty Nissen Fundoplication   · History of Foot Surgery    Family History  Mother    · Family history of Diabetes Mellitus (V18 0)   · Family history of Heart Disease (V17 49)   · Family history of Osteoporosis (V17 81)  Father    · Family history of Heart Disease (V17 49)  Family History    · Family history of Cancer    Social History    · Being A Social Drinker   ·    · Never A Smoker    Current Meds   1  Citracal Plus Oral Tablet; Take 1 tablet twice daily; Therapy: (Recorded:22Suf6916) to Recorded   2  [employer] Stool Softener 100 MG Oral Capsule; TAKE 1 CAPSULE Daily; Therapy: (Recorded:98Wkd8381) to Recorded   3  Fish Oil 1200 MG Oral Capsule; Take as directed; Therapy: (Recorded:89Rfp7793) to Recorded   4  Fluticasone Propionate 50 MCG/ACT Nasal Suspension; USE 2 SPRAYS IN EACH   NOSTRIL ONCE DAILY  Requested for: 77NKL5348; Last Rx:08Jan2016 Ordered   5  Metoprolol Succinate ER 25 MG Oral Tablet Extended Release 24 Hour;  Take 1 tablet daily; Therapy: 01TTT5680 to (Evaluate:64Ssc5853)  Requested for: 11Lrf0222; Last   Rx:88Inb4545 Ordered   6  Multiple Vitamin TABS; Take 1 tablet daily; Therapy: (Recorded:28Abx9321) to Recorded   7  [facility] Colon Health Oral Capsule; Therapy: (Recorded:05Jan2017) to Recorded   8  Proctosol HC 2 5 % Rectal Cream; USE AS DIRECTED; Therapy: 82JLK5179 to (Last Rx:25Snc8755)  Requested for: 04Oct2016 Ordered   9  RABEprazole Sodium 20 MG Oral Tablet Delayed Release; TAKE ONE (1) TABLET(S)   DAILY   [facility] M[facility];   Therapy: 99PRM5627 to (Last ZU:01GTR0639)  Requested for: 38YGM4793 Ordered   10  Vitamin D 1000 UNIT Oral Tablet; Take 1 tablet daily; Therapy: (Recorded:75Xuu2088) to Recorded   11  Zinc 50 MG CAPS; Therapy: (Recorded:05Jan2017) to Recorded   12  Zolpidem Tartrate 10 MG Oral Tablet; take 1/2 or 1 tablet as needed at bedtime for    insomnia; Therapy: 55SPB8147 to (Last Rx:04Oct2016) Ordered    Allergies    1  ACE Inhibitors   2  Angiotensin Receptor Blockers    Vitals   ** Printed in Appendix #1 below  Physical Exam    Constitutional   General appearance: No acute distress, well appearing and well nourished  Head and Face   Head and face: Normal     Neck   Neck: Supple, symmetric, trachea midline, no masses  Thyroid: Normal, no thyromegaly  Pulmonary   Respiratory effort: No increased work of breathing or signs of respiratory distress  Auscultation of lungs: Clear to auscultation  Cardiovascular   Auscultation of heart: Normal rate and rhythm, normal S1 and S2, no murmurs  Carotid pulses: 2+ bilaterally  Abdominal aorta: Normal     Peripheral vascular exam: Normal     Examination of extremities for edema and/or varicosities: Normal     Chest   Chest: Normal     Abdomen   Abdomen: Non-tender, no masses  Liver and spleen: No hepatomegaly or splenomegaly     Musculoskeletal   Gait and station: Normal     Neurologic   Cranial nerves: Cranial nerves II-XII intact  Psychiatric   Judgment and insight: Normal     Orientation to person, place, and time: Normal     Recent and remote memory: Intact  Mood and affect: Normal        Results/Data  [employer]Cal[employer] - Health Calculators 35QPO0275 10:39AM User, Ahs     Test Name Result Flag Reference   SBIRT Screen - Tobacco Screening Result Negative         Future Appointments    Date/Time Provider Specialty Site   2017 10:00 AM Leigh Curling, M D  Family Medicine [address] [place] [address]     Signatures   Electronically signed by :  BECCA Sharma ; 2017  3:16PM EST                       (Author)    Appendix #1     Patient: Yovanny Bryant ; : 1955; MRN: [id number]      Recorded: 99UPF4122 11:15AM Recorded: 01KTJ9321 10:36AM Recorded: 78ZXO6104 10:31AM   Temperature  97 7 F    Heart Rate  76    Respiration  14    Systolic 239 200    Diastolic 80 86    Height  5 ft 6 in 5 ft 6 in   Weight  194 lb  194 lb 6 08 oz   BMI Calculated  31 31 31 37   BSA Calculated  1 97 1 98

## 2018-01-18 NOTE — MISCELLANEOUS
Message   Recorded as Task   Date: 07/25/2017 11:49 AM, Created By: Tiffany Garrido   Task Name: Follow Up   Assigned To: Soila Valdes   Regarding Patient: Robert Glover, Status: In Progress   Comment:    Weil,Linda - 25 Jul 2017 11:49 AM     TASK CREATED  Caller: Self; General Medical Question; [phone] (Home); [phone] (Work)  FYI,  Patient called stating she is not noticing any difference with the Belsomira 15 mg  Started July 11 tooke it for 5 days and then started on 20mg on July 16 for 3 days and then took an Ambien on the July 19 and didn't get any sleep  July 20 went to [address] and is currently taking, not having great sleep  What are your thoughts, should she stay on them? Patient can be reached at [phone]  Steph Coronado - 25 Jul 2017 12:34 PM     TASK EDITED   Lydia Miller - 30 Jul 2017 8:32 PM     TASK IN PROGRESS   Tiffany Garrido - 03 Aug 2017 11:44 AM     TASK REPLIED TO: Previously Assigned To Soila Valdes  Patient is not going to continue with the [address] and she wants to know what to do? Please advise patient at [phone]  Abimael Bronson - 17 Aug 2017 12:58 PM     TASK REASSIGNED: Previously Assigned To Abhilash Lomeli - 21 Aug 2017 6:44 PM     TASK EDITED  Left message for patient to call me back tomorrow   I spoke with pt  She has tried Belsomra 15 mg po qhs for a few days, then she has increased dose to 20 mg po qhs  Patient has used Rx faithfully for a few weeks without any improvement  Plan  Insomnia    · Belsomra 20 MG Oral Tablet   · Zolpidem Tartrate 10 MG Oral Tablet   · Eszopiclone 2 MG Oral Tablet; TAKE 1 TABLET AT BEDTIME AS NEEDED FOR  SLEEP    Signatures   Electronically signed by :  BECCA Mcgrath ; Aug  7 2017  6:32PM EST                       (Author)

## 2018-01-22 VITALS
DIASTOLIC BLOOD PRESSURE: 80 MMHG | HEIGHT: 66 IN | WEIGHT: 190.25 LBS | SYSTOLIC BLOOD PRESSURE: 136 MMHG | HEART RATE: 80 BPM | TEMPERATURE: 98 F | BODY MASS INDEX: 30.58 KG/M2

## 2018-01-22 VITALS
TEMPERATURE: 97.7 F | BODY MASS INDEX: 31.18 KG/M2 | RESPIRATION RATE: 14 BRPM | HEART RATE: 76 BPM | DIASTOLIC BLOOD PRESSURE: 80 MMHG | SYSTOLIC BLOOD PRESSURE: 130 MMHG | WEIGHT: 194 LBS | HEIGHT: 66 IN

## 2018-01-23 NOTE — PROGRESS NOTES
Chief Complaint  Patient is here for urine check per task      Active Problems     1  Acid reflux disease (530 81) (K21 9)   2  Benign essential hypertension (401 1) (I10)   3  Bursitis (727 3) (M71 9)   4  Depression with anxiety (300 4) (F41 8)   5  Dermatitis (692 9) (L30 9)   6  Dyslipidemia (272 4) (E78 5)   7  Dyspepsia (536 8) (K30)   8  Fatigue (780 79) (R53 83)   9  Hematoma and contusion (924 9) (T14 8XXA)   10  Hiatal hernia (553 3) (K44 9)   11  History of allergy (V15 09) (Z88 9)   12  Hypokalemia (276 8) (E87 6)   13  Insomnia (780 52) (G47 00)   14  Irritable bowel syndrome (564 1) (K58 9)   15  Joint pain, knee (719 46) (M25 569)   16  Lateral epicondylitis, unspecified laterality (726 32) (M77 10)   17  Nausea (787 02) (R11 0)   18  Need for prophylactic vaccination and inoculation against influenza (V04 81) (Z23)   19  Pain in left ear (388 70) (H92 02)   20  Pain of mid back (724 5) (M54 9)   21  Pain, upper back (724 5) (M54 9)   22  Right inguinal pain (789 09) (R10 31)   23  Seasonal allergies (477 9) (J30 2)   24  Symptomatic menopausal or female climacteric states (627 2) (N95 1)    Sinusitis (473 9) (J32 9)          Current Meds   1  Citracal Plus Oral Tablet; Take 1 tablet twice daily; Therapy: (Recorded:56Yku2654) to Recorded   2  [employer]S Stool Softener 100 MG Oral Capsule; TAKE 1 CAPSULE Daily; Therapy: (Recorded:97Xfo6939) to Recorded   3  Estradiol 0 5 MG Oral Tablet; TAKE 1 TABLET DAILY; Therapy: (Recorded:43Ggg2363) to Recorded   4  Flaxseed Oil 1200 MG Oral Capsule; take 1 capsule daily; Therapy: 18ZDF0594 to Recorded   5  Fluticasone Propionate 50 MCG/ACT Nasal Suspension; USE 2 SPRAYS IN EACH   NOSTRIL ONCE DAILY  Requested for: 22WRI4100; Last Rx:08Jan2016 Ordered   6  Methocarbamol 750 MG Oral Tablet; TAKE 1 TABLET 3 times daily PRN muscle spasm; Therapy: 09ACL4908 to ([phone])  Requested for: 27RPC0831; Last   Rx:06Oct2017 Ordered   7   Metoprolol Succinate ER 25 MG Oral Tablet Extended Release 24 Hour; Take 1 tablet   daily; Therapy: 65WDP7274 to (Evaluate:43Dzf0442)  Requested for: 28FDN6319; Last   Rx:05Jan2017 Ordered   8  Multiple Vitamin TABS; Take 1 tablet daily; Therapy: (Recorded:93Aak1029) to Recorded   9  [facility] Colon Health Oral Capsule; Therapy: (Recorded:05Jan2017) to Recorded   10  Proctosol HC 2 5 % Rectal Cream; USE AS DIRECTED; Therapy: 97ZWP0686 to (Last Rx:04Oct2016)  Requested for: 04Oct2016 Ordered   11  Provera 2 5 MG Oral Tablet; Therapy: (Recorded:18Cqo3690) to Recorded   12  RABEprazole Sodium 20 MG Oral Tablet Delayed Release; TAKE ONE (1) TABLET(S)    DAILY    [facility] MFR;    Therapy: 74OXI1084 to (Last Rx:05Jan2017)  Requested for: 94WLM2186 Ordered   13  Vitamin D 1000 UNIT Oral Tablet; Take 1 tablet daily; Therapy: (Recorded:47Bfi8634) to Recorded   14  Zinc 50 MG CAPS; Therapy: (Recorded:05Jan2017) to Recorded   15  Zolpidem Tartrate 10 MG Oral Tablet; take 1/2 or 1 tablet as needed at bedtime for    insomnia; Therapy: 25YCU9021 to (Last Rx:16Sep2017) Ordered    Allergies    1  ACE Inhibitors   2  Angiotensin Receptor Blockers    Signatures   Electronically signed by :  BECCA Ivey ; Dec  6 2017  7:01PM EST                       (Author)

## 2018-02-16 RX ORDER — L.RHAMNOSUS/B.ANIMALIS(LACTIS) 3B CELL
CAPSULE ORAL
COMMUNITY
End: 2018-09-05 | Stop reason: ALTCHOICE

## 2018-02-16 RX ORDER — MULTIVIT-MIN/IRON/FOLIC ACID/K 18-600-40
1 CAPSULE ORAL DAILY
COMMUNITY

## 2018-02-16 RX ORDER — RABEPRAZOLE SODIUM 20 MG/1
TABLET, DELAYED RELEASE ORAL
COMMUNITY
Start: 2014-10-01 | End: 2018-02-19 | Stop reason: SDUPTHER

## 2018-02-16 RX ORDER — MEDROXYPROGESTERONE ACETATE 2.5 MG/1
TABLET ORAL
COMMUNITY
End: 2018-05-24

## 2018-02-16 RX ORDER — DOCUSATE SODIUM 100 MG/1
1 CAPSULE, LIQUID FILLED ORAL DAILY
COMMUNITY

## 2018-02-16 RX ORDER — FLUTICASONE PROPIONATE 50 MCG
2 SPRAY, SUSPENSION (ML) NASAL DAILY
COMMUNITY

## 2018-02-16 RX ORDER — MULTIVITAMIN
1 TABLET ORAL DAILY
COMMUNITY
End: 2018-09-05 | Stop reason: ALTCHOICE

## 2018-02-16 RX ORDER — METHOCARBAMOL 750 MG/1
1 TABLET, FILM COATED ORAL 3 TIMES DAILY
COMMUNITY
Start: 2017-10-06 | End: 2018-09-05 | Stop reason: ALTCHOICE

## 2018-02-16 RX ORDER — ESTRADIOL 0.5 MG/1
1 TABLET ORAL DAILY
COMMUNITY
End: 2018-10-08

## 2018-02-16 RX ORDER — ZOLPIDEM TARTRATE 10 MG/1
10 TABLET ORAL AS NEEDED
COMMUNITY
Start: 2017-09-16 | End: 2018-02-19 | Stop reason: SDUPTHER

## 2018-02-16 RX ORDER — METOPROLOL SUCCINATE 25 MG/1
1 TABLET, EXTENDED RELEASE ORAL DAILY
COMMUNITY
Start: 2015-11-16 | End: 2018-02-19 | Stop reason: SDUPTHER

## 2018-02-19 ENCOUNTER — OFFICE VISIT (OUTPATIENT)
Dept: FAMILY MEDICINE CLINIC | Facility: CLINIC | Age: 63
End: 2018-02-19
Payer: COMMERCIAL

## 2018-02-19 VITALS
BODY MASS INDEX: 31.15 KG/M2 | HEART RATE: 68 BPM | TEMPERATURE: 97.9 F | HEIGHT: 66 IN | WEIGHT: 193.8 LBS | SYSTOLIC BLOOD PRESSURE: 138 MMHG | RESPIRATION RATE: 16 BRPM | DIASTOLIC BLOOD PRESSURE: 78 MMHG

## 2018-02-19 DIAGNOSIS — K21.9 GASTROESOPHAGEAL REFLUX DISEASE, ESOPHAGITIS PRESENCE NOT SPECIFIED: ICD-10-CM

## 2018-02-19 DIAGNOSIS — Z00.00 HEALTHCARE MAINTENANCE: Primary | ICD-10-CM

## 2018-02-19 DIAGNOSIS — I10 HYPERTENSION, UNSPECIFIED TYPE: ICD-10-CM

## 2018-02-19 DIAGNOSIS — G47.00 INSOMNIA, UNSPECIFIED TYPE: ICD-10-CM

## 2018-02-19 PROCEDURE — 99396 PREV VISIT EST AGE 40-64: CPT | Performed by: FAMILY MEDICINE

## 2018-02-19 RX ORDER — MULTIVITAMIN WITH IRON
250 TABLET ORAL 2 TIMES DAILY
COMMUNITY
End: 2021-11-10 | Stop reason: SDUPTHER

## 2018-02-19 RX ORDER — RABEPRAZOLE SODIUM 20 MG/1
20 TABLET, DELAYED RELEASE ORAL DAILY
Qty: 90 TABLET | Refills: 0 | Status: SHIPPED | OUTPATIENT
Start: 2018-02-19 | End: 2018-05-24 | Stop reason: SDUPTHER

## 2018-02-19 RX ORDER — METOPROLOL SUCCINATE 25 MG/1
25 TABLET, EXTENDED RELEASE ORAL DAILY
Qty: 90 TABLET | Refills: 0 | Status: SHIPPED | OUTPATIENT
Start: 2018-02-19 | End: 2018-05-24 | Stop reason: SDUPTHER

## 2018-02-19 RX ORDER — ZOLPIDEM TARTRATE 10 MG/1
10 TABLET ORAL AS NEEDED
Qty: 30 TABLET | Refills: 0 | Status: SHIPPED | OUTPATIENT
Start: 2018-02-19 | End: 2018-09-06 | Stop reason: SDUPTHER

## 2018-02-19 NOTE — PROGRESS NOTES
FAMILY PRACTICE OFFICE VISIT       NAME: Mel Myers  AGE: 63 y o  SEX: female       : 1955        MRN: [id number]    DATE: 2018  TIME: 11:15 AM    Assessment and Plan     Problem List Items Addressed This Visit     Hypertension    Relevant Medications    metoprolol succinate (TOPROL-XL) 25 mg 24 hr tablet    Other Relevant Orders    CBC    Comprehensive metabolic panel    TSH, 3rd generation    Lipid panel    Gastroesophageal reflux disease    Relevant Medications    RABEprazole (ACIPHEX) 20 MG tablet    Other Relevant Orders    Ambulatory referral to Gastroenterology    CBC    Comprehensive metabolic panel    TSH, 3rd generation    Lipid panel    Insomnia    Relevant Medications    zolpidem (AMBIEN) 10 mg tablet      Other Visit Diagnoses     Healthcare maintenance    -  Primary       Annual well exam   Hypertension-well controlled, continue Toprol XL  Acid reflux disease-continue AcipHex  Will proceed with consultation with [facility]'s Gastroenterology to establish whether not patient needs another EGD  She will be scheduling her colonoscopy in spring of this year  Routine blood work orders as outlined above  Patient uses Ambien rarely as needed for symptoms of insomnia  Persistent symptoms of hot flashes  She remains on HRT  Pending diagnostic mammography in late February  Patient remains under close follow-up with gyn  She has been feeling generally well, denies any other concerns  Patient follows healthy diet and remains active  Follow up pending labs, updates, annually and as needed    There are no Patient Instructions on file for this visit          Chief Complaint     Chief Complaint   Patient presents with    Follow-up     History of Present Illness     Annual well  exam   On Toprol and Acipex  Pending  Right  Dx mammography in late February    Pending  colonoscopy   Prior EGD digna  5 years ago 13    [facility] repair - Emily Monroe    last labs -  Spring 2017   curious about Zostavax    HRT -estradiol and Provera  Patient is unable to wean off HRT due to severe hot flashes  She denies chest pain, palpitations, shortness of breath or dizziness  She is following healthy diet but is unable to control symptoms of acid reflux without daily PPI  Her sleep pattern has improved significantly, she uses Ambien on a as needed basis only  Blood pressures are well controlled at home  She uses Toprol XL 25 mg once a day as directed               Review of Systems   Review of Systems   Constitutional: Negative  HENT: Negative  Eyes: Negative  Respiratory: Negative  Cardiovascular: Negative  Gastrointestinal: Negative for abdominal pain, constipation, diarrhea and nausea  As noted in HPI   Endocrine: Positive for heat intolerance  Genitourinary: Negative  Musculoskeletal: Negative  Neurological: Negative  Hematological: Negative  Psychiatric/Behavioral: Positive for sleep disturbance  Active Problem List     Patient Active Problem List   Diagnosis    Hypertension    Gastroesophageal reflux disease    Insomnia       Past Medical History:  Past Medical History:   Diagnosis Date    Chest pain     Colon polyps        Past Surgical History:  Past Surgical History:   Procedure Laterality Date    COLONOSCOPY  03/2013    repeat in 5 yrs,  Dr Isabel Pillai      Nissen fundoplication    FOOT SURGERY         Family History:  Family History   Problem Relation Age of Onset    Diabetes Mother     Heart disease Mother     Osteoporosis Mother     Heart disease Father     Cancer Family        Social History:  Social History     Social History    Marital status: /Civil Union     Spouse name: N/A    Number of children: N/A    Years of education: N/A     Occupational History    Not on file       Social History Main Topics    Smoking status: Never Smoker    Smokeless tobacco: Never Used    Alcohol use Yes      Comment: Social    Drug use: No    Sexual activity: Not on file     Other Topics Concern    Not on file     Social History Narrative    No narrative on file     I have reviewed the patient's medical history in detail; there are no changes to the history as noted in the electronic medical record  Objective     Vitals:    02/19/18 1115   BP: 138/78   Pulse:    Resp:    Temp:      Wt Readings from Last 3 Encounters:   02/19/18 87 9 kg (193 lb 12 8 oz)   12/04/17 86 3 kg (190 lb 4 oz)   10/06/17 85 3 kg (188 lb)     Vitals:    02/19/18 1034 02/19/18 1115   BP: 140/98 138/78   BP Location: Left arm    Patient Position: Sitting    Cuff Size: Adult    Pulse: 68    Resp: 16    Temp: 97 9 °F (36 6 °C)    TempSrc: Tympanic    Weight: 87 9 kg (193 lb 12 8 oz)    Height: 5' 6" (1 676 m)        Physical Exam   Constitutional: She is oriented to person, place, and time  She appears well-developed and well-nourished  HENT:   Head: Normocephalic and atraumatic  Right Ear: Tympanic membrane normal    Left Ear: Tympanic membrane normal    Eyes: Conjunctivae are normal  Pupils are equal, round, and reactive to light  Neck: Normal range of motion  Neck supple  No thyromegaly present  Cardiovascular: Normal rate, regular rhythm and normal heart sounds  No murmur heard  Pulmonary/Chest: Effort normal and breath sounds normal  She has no wheezes  She has no rales  Abdominal: Soft  Bowel sounds are normal  She exhibits no abdominal bruit  There is no tenderness  Musculoskeletal: Normal range of motion  She exhibits no edema  Neurological: She is alert and oriented to person, place, and time  No cranial nerve deficit  Skin: No rash noted  Psychiatric: She has a normal mood and affect  Her behavior is normal  Judgment and thought content normal    Nursing note and vitals reviewed        Pertinent Laboratory/Diagnostic Studies:  Lab Results   Component Value Date    GLUCOSE 95 06/10/2015    BUN 16 06/10/2015    CREATININE 0 63 06/10/2015    CALCIUM 8 9 06/10/2015     06/10/2015    K 3 6 06/10/2015    CO2 28 06/10/2015     06/10/2015     Lab Results   Component Value Date    ALT 32 06/10/2015    AST 21 06/10/2015    ALKPHOS 55 06/10/2015    BILITOT 0 38 06/10/2015       Lab Results   Component Value Date    WBC 6 26 02/07/2015    HGB 13 8 02/07/2015    HCT 41 5 02/07/2015    MCV 94 02/07/2015     02/07/2015       No results found for: TSH    Lab Results   Component Value Date    CHOL 215 (H) 05/10/2016     Lab Results   Component Value Date    TRIG 89 05/10/2016     Lab Results   Component Value Date    HDL 58 05/10/2016     Lab Results   Component Value Date    LDLCALC 111 (H) 06/10/2015     No results found for: HGBA1C    Results for orders placed or performed in visit on 10/11/17   Urinalysis with microscopic   Result Value Ref Range    Clarity, UA Cloudy     Color, UA Yellow     Specific [place], UA 1 023 1 003 - 1 030    pH, UA 6 0 4 5 - 8 0    Glucose, UA Negative Negative mg/dl    Ketones, UA Negative Negative mg/dl    Blood, UA Negative Negative    Protein, UA Negative Negative mg/dl    Nitrite, UA Negative Negative    Bilirubin, UA Negative Negative    Urobilinogen, UA 0 2 0 2, 1 0 E U /dl E U /dl    Leukocytes, UA Negative Negative    WBC, UA None Seen None Seen, 0-5, 5-55, 5-65 /hpf    RBC, UA None Seen None Seen, 0-5 /hpf    Bacteria, UA None Seen None Seen, Occasional /hpf    Ca Oxalate Anahi, UA Occasional (A) None Seen /hpf    Epithelial Cells Occasional None Seen, Occasional /hpf       Orders Placed This Encounter   Procedures    CBC    Comprehensive metabolic panel    TSH, 3rd generation    Lipid panel    Ambulatory referral to Gastroenterology       ALLERGIES:  Allergies   Allergen Reactions    Ace Inhibitors Cough     Category:  Adverse Reaction;     Angiotensin Receptor Blockers      Other reaction(s): Unknown Allergic Reaction       Current Medications     Current Outpatient Prescriptions Medication Sig Dispense Refill    cholecalciferol (VITAMIN D3) 1,000 units tablet Take 1 tablet by mouth daily      docusate sodium ([employer]S STOOL SOFTENER) 100 mg capsule Take 1 capsule by mouth daily      estradiol (ESTRACE) 0 5 MG tablet Take 1 tablet by mouth daily      Flaxseed, Linseed, (FLAXSEED OIL) 1200 MG CAPS Take 1 capsule by mouth daily      fluticasone (FLONASE) 50 mcg/act nasal spray 2 sprays into each nostril daily      hydrocortisone (PROCTOSOL HC) 2 5 % rectal cream Insert into the rectum as needed        Magnesium 250 MG TABS Take 250 mg by mouth 2 (two) times a day      medroxyPROGESTERone (PROVERA) 2 5 mg tablet Take by mouth      metoprolol succinate (TOPROL-XL) 25 mg 24 hr tablet Take 1 tablet (25 mg total) by mouth daily 90 tablet 0    Multiple Minerals-Vitamins (CITRACAL PLUS PO) Take 1 tablet by mouth 2 (two) times a day      Probiotic Product ([address]) CAPS Take by mouth      RABEprazole (ACIPHEX) 20 MG tablet Take 1 tablet (20 mg total) by mouth daily [place] generic name for pt 90 tablet 0    zolpidem (AMBIEN) 10 mg tablet Take 1 tablet (10 mg total) by mouth as needed (as needed) 30 tablet 0    methocarbamol (ROBAXIN) 750 mg tablet Take 1 tablet by mouth 3 (three) times a day      Multiple Vitamin tablet Take 1 tablet by mouth daily      Zinc 50 MG CAPS Take by mouth       No current facility-administered medications for this visit  Health Maintenance   There are no preventive care reminders to display for this patient    Immunization History   Administered Date(s) Administered    Influenza Quadrivalent Preservative Free 3 years and older IM 11/14/2016, 09/16/2017    Influenza TIV (IM) 10/09/2013, 09/15/2014, 10/02/2015       Isabel Palomino MD

## 2018-02-27 ENCOUNTER — TRANSCRIBE ORDERS (OUTPATIENT)
Dept: MAMMOGRAPHY | Facility: CLINIC | Age: 63
End: 2018-02-27

## 2018-02-27 ENCOUNTER — HOSPITAL ENCOUNTER (OUTPATIENT)
Dept: MAMMOGRAPHY | Facility: CLINIC | Age: 63
Discharge: HOME | End: 2018-02-27
Payer: COMMERCIAL

## 2018-02-27 DIAGNOSIS — Z09 FOLLOW-UP EXAM, 3-6 MONTHS SINCE PREVIOUS EXAM: ICD-10-CM

## 2018-02-27 DIAGNOSIS — R92.8 ABNORMAL MAMMOGRAM: Primary | ICD-10-CM

## 2018-02-27 PROCEDURE — 77065 DX MAMMO INCL CAD UNI: CPT

## 2018-04-10 ENCOUNTER — OFFICE VISIT (OUTPATIENT)
Dept: GASTROENTEROLOGY | Facility: CLINIC | Age: 63
End: 2018-04-10
Payer: COMMERCIAL

## 2018-04-10 VITALS
TEMPERATURE: 98.5 F | BODY MASS INDEX: 30.98 KG/M2 | HEART RATE: 69 BPM | WEIGHT: 192.8 LBS | SYSTOLIC BLOOD PRESSURE: 149 MMHG | DIASTOLIC BLOOD PRESSURE: 89 MMHG | HEIGHT: 66 IN

## 2018-04-10 DIAGNOSIS — K21.9 GASTROESOPHAGEAL REFLUX DISEASE, ESOPHAGITIS PRESENCE NOT SPECIFIED: ICD-10-CM

## 2018-04-10 DIAGNOSIS — Z86.010 HISTORY OF COLON POLYPS: Primary | ICD-10-CM

## 2018-04-10 PROBLEM — Z86.0100 HISTORY OF COLON POLYPS: Status: ACTIVE | Noted: 2018-04-10

## 2018-04-10 PROCEDURE — 99244 OFF/OP CNSLTJ NEW/EST MOD 40: CPT | Performed by: INTERNAL MEDICINE

## 2018-04-10 NOTE — LETTER
April 10, 2018     Anay Logan MD  [address] [facility] [address]    Patient: De Suarez   YOB: 1955   Date of Visit: 4/10/2018       Dear Dr Braden Reyes: Thank you for referring Fallon Hendricks to me for evaluation  Below are my notes for this consultation  If you have questions, please do not hesitate to call me  I look forward to following your patient along with you  Sincerely,        Mir Garcia MD        CC: Rondi Cranker, MD Janith Haas, MD  4/10/2018 11:18 AM  Sign at close encounter  [facility] - Outpatient Consultation  De Suarez 63 y o  female MRN: [id number]  Encounter: [id number]          ASSESSMENT AND PLAN:       Gastroesophageal reflux disease -her reflux symptoms are well controlled  Continue omeprazole 20 milligram daily  Take 30 minutes before breakfast   We also reviewed reflux precautions  Since her reflux symptoms are well controlled and she had EGDs in the past repeat EGD is not indicated at this time  Advised the patient to call our office if she started having reflux symptoms in the future  Prior hiatal hernia repair ( TIF) - she has no chest   pain or regurgitation at this time  Continue PPI    Personal history of colon polyp -surveillance colonoscopy will be scheduled by her colorectal surgeon  ______________________________________________________________________    HPI:  57-year-old female with gastroesophageal reflux disease here to discuss further management of her reflux  She was diagnosed with gastroesophageal reflux disease several years ago  At that time she was also found to have hiatal hernia and underwent TIF procedure by Dr Lisa Kuhn in 2010  Unfortunately she continued to require PPI therapy after her procedure  She had few EGDs in the past   Her last EGD was in 2013 by Dr Vishal Paniagua  I reviewed office visit note by Dr Vishal Paniagua in 2013      She was previously on several different PPI therapies  For the last few years she has been taking rabeprazole 20 milligram daily  For the most part her reflux symptoms are well controlled  She denies dysphagia, nausea, vomiting, abdominal pain or change in bowel habit  She has personal history of colon polyp and is due for surveillance colonoscopy which is going to be scheduled by Dr Rosaline Myers  REVIEW OF SYSTEMS:    CONSTITUTIONAL: Denies any fever, chills, rigors, and weight loss  HEENT: No earache or tinnitus  Denies hearing loss or visual disturbances  CARDIOVASCULAR: No chest pain or palpitations  RESPIRATORY: Denies any cough, hemoptysis, shortness of breath or dyspnea on exertion  GASTROINTESTINAL: As noted in the History of Present Illness  GENITOURINARY: No problems with urination  Denies any hematuria or dysuria  NEUROLOGIC: No dizziness or vertigo, denies headaches  MUSCULOSKELETAL: Denies any muscle or joint pain  SKIN: Denies skin rashes or itching  ENDOCRINE: Denies excessive thirst  Denies intolerance to heat or cold  PSYCHOSOCIAL: Denies depression or anxiety  Denies any recent memory loss         Historical Information   Past Medical History:   Diagnosis Date    Arthritis     Cancer ([facility] [address] )     skin    Chest pain     Colon polyps     GERD (gastroesophageal reflux disease)     Hypertension      Past Surgical History:   Procedure Laterality Date    COLONOSCOPY  03/2013    repeat in 5 yrs,  Dr Ramiro Russo      Nissen fundoplication    ESOPHAGOGASTRODUODENOSCOPY      FOOT SURGERY      HERNIA REPAIR       Social History   History   Alcohol Use    Yes     Comment: Social     History   Drug Use No     History   Smoking Status    Never Smoker   Smokeless Tobacco    Never Used     Family History   Problem Relation Age of Onset    Diabetes Mother     Heart disease Mother     Osteoporosis Mother     Heart disease Father     Cancer Family Meds/Allergies       Current Outpatient Prescriptions:     cholecalciferol (VITAMIN D3) 1,000 units tablet    docusate sodium (CVS STOOL SOFTENER) 100 mg capsule    estradiol (ESTRACE) 0 5 MG tablet    Flaxseed, Linseed, (FLAXSEED OIL) 1200 MG CAPS    fluticasone (FLONASE) 50 mcg/act nasal spray    hydrocortisone (PROCTOSOL HC) 2 5 % rectal cream    Magnesium 250 MG TABS    medroxyPROGESTERone (PROVERA) 2 5 mg tablet    methocarbamol (ROBAXIN) 750 mg tablet    metoprolol succinate (TOPROL-XL) 25 mg 24 hr tablet    Multiple Minerals-Vitamins (CITRACAL PLUS PO)    Multiple Vitamin tablet    Probiotic Product ([employer]) CAPS    RABEprazole (ACIPHEX) 20 MG tablet    Zinc 50 MG CAPS    zolpidem (AMBIEN) 10 mg tablet    Allergies   Allergen Reactions    Ace Inhibitors Cough     Category: Adverse Reaction;     Angiotensin Receptor Blockers      Other reaction(s): Unknown Allergic Reaction           Objective     Blood pressure 149/89, pulse 69, temperature 98 5 °F (36 9 °C), temperature source Tympanic, height 5' 6" (1 676 m), weight 87 5 kg (192 lb 12 8 oz)  PHYSICAL EXAM:      General Appearance:   Alert, cooperative, no distress   HEENT:   Normocephalic, atraumatic, anicteric      Neck:  Supple, symmetrical, trachea midline   Lungs:   Clear to auscultation bilaterally; no rales, rhonchi or wheezing; respirations unlabored    Heart[de-identified]   Regular rate and rhythm; no murmur, rub, or gallop  Abdomen:   Soft, non-tender, non-distended; normal bowel sounds; no masses, no organomegaly    Genitalia:   Deferred    Rectal:   Deferred    Extremities:  No cyanosis, clubbing or edema    Pulses:  2+ and symmetric    Skin:  No jaundice, rashes, or lesions    Lymph nodes:  No palpable cervical lymphadenopathy        Lab Results:   No visits with results within 1 Day(s) from this visit     Latest known visit with results is:   Appointment on 10/11/2017   Component Date Value    Clarity, UA 10/11/2017 Cloudy     Color, UA 10/11/2017 Yellow     Specific Gravity, UA 10/11/2017 1 023     pH, UA 10/11/2017 6 0     Glucose, UA 10/11/2017 Negative     Ketones, UA 10/11/2017 Negative     Blood, UA 10/11/2017 Negative     Protein, UA 10/11/2017 Negative     Nitrite, UA 10/11/2017 Negative     Bilirubin, UA 10/11/2017 Negative     Urobilinogen, UA 10/11/2017 0 2     Leukocytes, UA 10/11/2017 Negative     WBC, UA 10/11/2017 None Seen     RBC, UA 10/11/2017 None Seen     Bacteria, UA 10/11/2017 None Seen     Ca Oxalate Anahi, UA 10/11/2017 Occasional*    Epithelial Cells 10/11/2017 Occasional

## 2018-04-10 NOTE — PROGRESS NOTES
[facility] - Outpatient Consultation  Jennifer Vergara 63 y o  female MRN: [id number]  Encounter: [id number]          ASSESSMENT AND PLAN:       Gastroesophageal reflux disease -her reflux symptoms are well controlled  Continue omeprazole 20 milligram daily  Take 30 minutes before breakfast   We also reviewed reflux precautions  Since her reflux symptoms are well controlled and she had EGDs in the past repeat EGD is not indicated at this time  Advised the patient to call our office if she started having reflux symptoms in the future  Prior hiatal hernia repair ( TIF) - she has no chest   pain or regurgitation at this time  Continue PPI    Personal history of colon polyp -surveillance colonoscopy will be scheduled by her colorectal surgeon  ______________________________________________________________________    HPI:  63-year-old female with gastroesophageal reflux disease here to discuss further management of her reflux  She was diagnosed with gastroesophageal reflux disease several years ago  At that time she was also found to have hiatal hernia and underwent TIF procedure by Dr Cristofer Marc in 2010  Unfortunately she continued to require PPI therapy after her procedure  She had few EGDs in the past   Her last EGD was in 2013 by Dr Mabel Eisenmenger  I reviewed office visit note by Dr Mabel Eisenmenger in 2013  She was previously on several different PPI therapies  For the last few years she has been taking rabeprazole 20 milligram daily  For the most part her reflux symptoms are well controlled  She denies dysphagia, nausea, vomiting, abdominal pain or change in bowel habit  She has personal history of colon polyp and is due for surveillance colonoscopy which is going to be scheduled by Dr Rosaline Myers  REVIEW OF SYSTEMS:    CONSTITUTIONAL: Denies any fever, chills, rigors, and weight loss  HEENT: No earache or tinnitus   Denies hearing loss or visual disturbances  CARDIOVASCULAR: No chest pain or palpitations  RESPIRATORY: Denies any cough, hemoptysis, shortness of breath or dyspnea on exertion  GASTROINTESTINAL: As noted in the History of Present Illness  GENITOURINARY: No problems with urination  Denies any hematuria or dysuria  NEUROLOGIC: No dizziness or vertigo, denies headaches  MUSCULOSKELETAL: Denies any muscle or joint pain  SKIN: Denies skin rashes or itching  ENDOCRINE: Denies excessive thirst  Denies intolerance to heat or cold  PSYCHOSOCIAL: Denies depression or anxiety  Denies any recent memory loss         Historical Information   Past Medical History:   Diagnosis Date    Arthritis     Cancer ([facility] [address] )     skin    Chest pain     Colon polyps     GERD (gastroesophageal reflux disease)     Hypertension      Past Surgical History:   Procedure Laterality Date    COLONOSCOPY  03/2013    repeat in 5 yrs,  Dr Davon Chinchilla      Nissen fundoplication    ESOPHAGOGASTRODUODENOSCOPY      FOOT SURGERY      HERNIA REPAIR       Social History   History   Alcohol Use    Yes     Comment: Social     History   Drug Use No     History   Smoking Status    Never Smoker   Smokeless Tobacco    Never Used     Family History   Problem Relation Age of Onset    Diabetes Mother     Heart disease Mother     Osteoporosis Mother     Heart disease Father     Cancer Family        Meds/Allergies       Current Outpatient Prescriptions:     cholecalciferol (VITAMIN D3) 1,000 units tablet    docusate sodium (CVS STOOL SOFTENER) 100 mg capsule    estradiol (ESTRACE) 0 5 MG tablet    Flaxseed, Linseed, (FLAXSEED OIL) 1200 MG CAPS    fluticasone (FLONASE) 50 mcg/act nasal spray    hydrocortisone (PROCTOSOL HC) 2 5 % rectal cream    Magnesium 250 MG TABS    medroxyPROGESTERone (PROVERA) 2 5 mg tablet    methocarbamol (ROBAXIN) 750 mg tablet    metoprolol succinate (TOPROL-XL) 25 mg 24 hr tablet    Multiple Minerals-Vitamins (CITRACAL PLUS PO)    Multiple Vitamin tablet    Probiotic Product ([employer]) CAPS    RABEprazole (ACIPHEX) 20 MG tablet    Zinc 50 MG CAPS    zolpidem (AMBIEN) 10 mg tablet    Allergies   Allergen Reactions    Ace Inhibitors Cough     Category: Adverse Reaction;     Angiotensin Receptor Blockers      Other reaction(s): Unknown Allergic Reaction           Objective     Blood pressure 149/89, pulse 69, temperature 98 5 °F (36 9 °C), temperature source Tympanic, height 5' 6" (1 676 m), weight 87 5 kg (192 lb 12 8 oz)  PHYSICAL EXAM:      General Appearance:   Alert, cooperative, no distress   HEENT:   Normocephalic, atraumatic, anicteric      Neck:  Supple, symmetrical, trachea midline   Lungs:   Clear to auscultation bilaterally; no rales, rhonchi or wheezing; respirations unlabored    Heart[de-identified]   Regular rate and rhythm; no murmur, rub, or gallop  Abdomen:   Soft, non-tender, non-distended; normal bowel sounds; no masses, no organomegaly    Genitalia:   Deferred    Rectal:   Deferred    Extremities:  No cyanosis, clubbing or edema    Pulses:  2+ and symmetric    Skin:  No jaundice, rashes, or lesions    Lymph nodes:  No palpable cervical lymphadenopathy        Lab Results:   No visits with results within 1 Day(s) from this visit     Latest known visit with results is:   Appointment on 10/11/2017   Component Date Value    Clarity, UA 10/11/2017 Cloudy     Color, UA 10/11/2017 Yellow     Specific Gravity, UA 10/11/2017 1 023     pH, UA 10/11/2017 6 0     Glucose, UA 10/11/2017 Negative     Ketones, UA 10/11/2017 Negative     Blood, UA 10/11/2017 Negative     Protein, UA 10/11/2017 Negative     Nitrite, UA 10/11/2017 Negative     Bilirubin, UA 10/11/2017 Negative     Urobilinogen, UA 10/11/2017 0 2     Leukocytes, UA 10/11/2017 Negative     WBC, UA 10/11/2017 None Seen     RBC, UA 10/11/2017 None Seen     Bacteria, UA 10/11/2017 None Seen     Ca Oxalate Anahi, UA 10/11/2017 Occasional*    Epithelial Cells 10/11/2017 Occasional

## 2018-05-24 ENCOUNTER — OFFICE VISIT (OUTPATIENT)
Dept: FAMILY MEDICINE CLINIC | Facility: CLINIC | Age: 63
End: 2018-05-24
Payer: COMMERCIAL

## 2018-05-24 VITALS
HEART RATE: 68 BPM | RESPIRATION RATE: 14 BRPM | SYSTOLIC BLOOD PRESSURE: 150 MMHG | HEIGHT: 66 IN | DIASTOLIC BLOOD PRESSURE: 76 MMHG | BODY MASS INDEX: 30.79 KG/M2 | WEIGHT: 191.6 LBS | TEMPERATURE: 97.1 F

## 2018-05-24 DIAGNOSIS — K21.9 GASTROESOPHAGEAL REFLUX DISEASE, ESOPHAGITIS PRESENCE NOT SPECIFIED: ICD-10-CM

## 2018-05-24 DIAGNOSIS — I10 HYPERTENSION, UNSPECIFIED TYPE: Primary | ICD-10-CM

## 2018-05-24 DIAGNOSIS — H53.10 SUBJECTIVE VISUAL DISTURBANCE OF RIGHT EYE: ICD-10-CM

## 2018-05-24 PROCEDURE — 99213 OFFICE O/P EST LOW 20 MIN: CPT | Performed by: FAMILY MEDICINE

## 2018-05-24 RX ORDER — METOPROLOL SUCCINATE 25 MG/1
25 TABLET, EXTENDED RELEASE ORAL 2 TIMES DAILY
Qty: 180 TABLET | Refills: 2 | Status: SHIPPED | OUTPATIENT
Start: 2018-05-24 | End: 2019-03-03 | Stop reason: SDUPTHER

## 2018-05-24 NOTE — PATIENT INSTRUCTIONS
Please are schedule appointment with either Optometry or Ophthalmology regarding right eye blurriness    please increase dose of metoprolol   To 1 tablet twice a day  Please update me with your blood pressure readings and heart rate in 7-10 days

## 2018-05-24 NOTE — PROGRESS NOTES
FAMILY PRACTICE OFFICE VISIT       NAME: Rayna Castro  AGE: 63 y o  SEX: female       : 1955        MRN: [id number]    DATE: 2018  TIME: 11:21 PM    Assessment and Plan     Problem List Items Addressed This Visit     Hypertension - Primary    Relevant Medications    metoprolol succinate (TOPROL-XL) 25 mg 24 hr tablet      Other Visit Diagnoses     Subjective visual disturbance of right eye           Patient presents for evaluation of hypertension  Blood pressure is mildly elevated  Recent occurrence of intermittent visual blurriness as well as recurrent floater in right eye  Patient denies headache, no numbness, tingling, paresthesias or weakness  Her neurological exam is unremarkable and she appears to be in no distress  Will increase dose of Toprol-XL from 25 mg once a day to 25 mg twice a day  I strongly advised patient to proceed with eye exam ASAP  If no pertinent findings on eye exam and if his symptoms persist -will proceed with further workup  Patient will update me with her blood pressures in 1 week  She will contact me with any new symptoms or concerns  Patient Instructions   Please are schedule appointment with either Optometry or Ophthalmology regarding right eye blurriness    please increase dose of metoprolol   To 1 tablet twice a day  Please update me with your blood pressure readings and heart rate in 7-10 days  Chief Complaint     Chief Complaint   Patient presents with    Hypertension     Patient is here for an ongoing issue with elevated blood pressure and blurred vision x's 1+ mths  History of Present Illness     Patient presents for evaluation of hypertension    She describes intermittent OD blurriness and floater on and off since Tuesday, May 22nd  Patient reports history of floaters in the past   No daily  Headaches    Blurriness is slightly better,  floater is persisting   Optometry -  Dr Miranda  Patient had eye  exam  A few months ago B/P at home are elevated   No  numbness or dizziness    On Toprol XL 25 mg po qd    On HRT as per GYN  Blood pressures at home ranging from 130s to 140s,- 150s, her eyes blood pressure once 162/85  Patient denies chest pain ,palpitations, shortness of breath or dizziness  No leg edema    Patient is intolerant to Ace inhibitors and angiotensin receptor blockers  She also tried Norvasc in the past and had experienced adverse side effects  Hypertension   This is a chronic problem  The problem is uncontrolled  Associated symptoms include blurred vision  Pertinent negatives include no chest pain, headaches, malaise/fatigue, neck pain, orthopnea, palpitations, peripheral edema or shortness of breath  There are no associated agents to hypertension  Risk factors for coronary artery disease include post-menopausal state  There are no compliance problems  Review of Systems   Review of Systems   Constitutional: Negative  Negative for malaise/fatigue  HENT: Negative  Eyes: Positive for blurred vision and visual disturbance  Respiratory: Negative  Negative for shortness of breath  Cardiovascular: Negative  Negative for chest pain, palpitations and orthopnea  Gastrointestinal: Negative  Musculoskeletal: Negative for neck pain  Neurological: Negative  Negative for headaches  Hematological: Negative  Psychiatric/Behavioral: Negative          Active Problem List     Patient Active Problem List   Diagnosis    Hypertension    Gastroesophageal reflux disease    Insomnia    History of colon polyps       Past Medical History:  Past Medical History:   Diagnosis Date    Arthritis     Cancer ([address] )     skin    Chest pain     Colon polyps     GERD (gastroesophageal reflux disease)     Hypertension        Past Surgical History:  Past Surgical History:   Procedure Laterality Date    COLONOSCOPY  03/2013    repeat in 5 yrs,  Dr Ricardo  fundoplication    ESOPHAGOGASTRODUODENOSCOPY      FOOT SURGERY      HERNIA REPAIR         Family History:  Family History   Problem Relation Age of Onset    Diabetes Mother     Heart disease Mother     Osteoporosis Mother     Heart disease Father     Cancer Family        Social History:  Social History     Social History    Marital status: /Civil Union     Spouse name: N/A    Number of children: N/A    Years of education: N/A     Occupational History    Not on file  Social History Main Topics    Smoking status: Never Smoker    Smokeless tobacco: Never Used    Alcohol use Yes      Comment: Social    Drug use: No    Sexual activity: Not on file     Other Topics Concern    Not on file     Social History Narrative    No narrative on file       Objective     Vitals:    05/24/18 1444 05/24/18 1520 05/24/18 1521   BP: (!) 158/108 152/84 150/76   BP Location:  Left arm    Patient Position:  Sitting    Pulse: 68     Resp: 14     Temp: (!) 97 1 °F (36 2 °C)     TempSrc: Tympanic     Weight: 86 9 kg (191 lb 9 6 oz)     Height: 5' 6" (1 676 m)         Wt Readings from Last 3 Encounters:   05/24/18 86 9 kg (191 lb 9 6 oz)   04/10/18 87 5 kg (192 lb 12 8 oz)   02/19/18 87 9 kg (193 lb 12 8 oz)       Physical Exam   Constitutional: She is oriented to person, place, and time  She appears well-developed and well-nourished  HENT:   Head: Normocephalic and atraumatic  Eyes: Conjunctivae and EOM are normal    Neck: Neck supple  Carotid bruit is not present  No thyromegaly present  Cardiovascular: Normal rate, regular rhythm and normal heart sounds  No murmur heard  Pulmonary/Chest: Effort normal and breath sounds normal  No respiratory distress  She has no wheezes  Musculoskeletal: Normal range of motion  She exhibits no edema  Neurological: She is alert and oriented to person, place, and time  No cranial nerve deficit  Psychiatric: She has a normal mood and affect   Her behavior is normal  Nursing note and vitals reviewed  Pertinent Laboratory/Diagnostic Studies:  Lab Results   Component Value Date    GLUCOSE 95 06/10/2015    BUN 17 05/10/2016    CREATININE 0 75 05/10/2016    CALCIUM 9 9 05/10/2016     05/10/2016    K 4 5 05/10/2016    CO2 26 05/10/2016     05/10/2016     Lab Results   Component Value Date    ALT 24 05/10/2016    AST 19 05/10/2016    ALKPHOS 50 05/10/2016    BILITOT 0 5 05/10/2016       Lab Results   Component Value Date    WBC 6 1 05/10/2016    HGB 14 4 05/10/2016    HCT 44 1 05/10/2016    MCV 95 4 05/10/2016     05/10/2016       No results found for: TSH    Lab Results   Component Value Date    CHOL 215 (H) 05/10/2016     Lab Results   Component Value Date    TRIG 89 05/10/2016     Lab Results   Component Value Date    HDL 58 05/10/2016     Lab Results   Component Value Date    LDLCALC 111 (H) 06/10/2015     No results found for: HGBA1C    Results for orders placed or performed in visit on 10/11/17   Urinalysis with microscopic   Result Value Ref Range    Clarity, UA Cloudy     Color, UA Yellow     Specific [place], UA 1 023 1 003 - 1 030    pH, UA 6 0 4 5 - 8 0    Glucose, UA Negative Negative mg/dl    Ketones, UA Negative Negative mg/dl    Blood, UA Negative Negative    Protein, UA Negative Negative mg/dl    Nitrite, UA Negative Negative    Bilirubin, UA Negative Negative    Urobilinogen, UA 0 2 0 2, 1 0 E U /dl E U /dl    Leukocytes, UA Negative Negative    WBC, UA None Seen None Seen, 0-5, 5-55, 5-65 /hpf    RBC, UA None Seen None Seen, 0-5 /hpf    Bacteria, UA None Seen None Seen, Occasional /hpf    Ca Oxalate [place], UA Occasional (A) None Seen /hpf    Epithelial Cells Occasional None Seen, Occasional /hpf       No orders of the defined types were placed in this encounter  ALLERGIES:  Allergies   Allergen Reactions    Ace Inhibitors Cough     Category:  Adverse Reaction;     Angiotensin Receptor Blockers      Other reaction(s): Unknown Allergic Reaction       Current Medications     Current Outpatient Prescriptions   Medication Sig Dispense Refill    cholecalciferol (VITAMIN D3) 1,000 units tablet Take 1 tablet by mouth daily      docusate sodium ([employer] STOOL SOFTENER) 100 mg capsule Take 1 capsule by mouth daily      estradiol (ESTRACE) 0 5 MG tablet Take 1 tablet by mouth daily      Flaxseed, Linseed, (FLAXSEED OIL) 1200 MG CAPS Take 1 capsule by mouth daily      fluticasone (FLONASE) 50 mcg/act nasal spray 2 sprays into each nostril daily      hydrocortisone (PROCTOSOL HC) 2 5 % rectal cream Insert into the rectum as needed        Magnesium 250 MG TABS Take 250 mg by mouth 2 (two) times a day      methocarbamol (ROBAXIN) 750 mg tablet Take 1 tablet by mouth 3 (three) times a day      metoprolol succinate (TOPROL-XL) 25 mg 24 hr tablet Take 1 tablet (25 mg total) by mouth 2 (two) times a day 180 tablet 2    Multiple Minerals-Vitamins (CITRACAL PLUS PO) Take 1 tablet by mouth 2 (two) times a day      Multiple Vitamin tablet Take 1 tablet by mouth daily      Probiotic Product ([address]) CAPS Take by mouth      zolpidem (AMBIEN) 10 mg tablet Take 1 tablet (10 mg total) by mouth as needed (as needed) 30 tablet 0    RABEprazole (ACIPHEX) 20 MG tablet TAKE 1 TABLET BY MOUTH   DAILY 90 tablet 3    Zinc 50 MG CAPS Take by mouth       No current facility-administered medications for this visit            Health Maintenance     Health Maintenance   Topic Date Due    HIV SCREENING  1955    Hepatitis C Screening  1955    COLONOSCOPY  1955    PNEUMOCOCCAL POLYSACCHARIDE VACCINE X 2 AGE 11-64 YEARS IMMUNOCOMPROMISED (1) 11/27/1968    DTaP,Tdap,and Td Vaccines (1 - Tdap) 11/27/1978    INFLUENZA VACCINE  09/01/2018    Depression Screening PHQ-9  02/19/2019     Immunization History   Administered Date(s) Administered    Influenza Quadrivalent Preservative Free 3 years and older IM 11/14/2016, 09/16/2017    Influenza TIV (IM) 10/09/2013, 09/15/2014, 10/02/2015       Shyanne Wilson MD

## 2018-05-25 RX ORDER — RABEPRAZOLE SODIUM 20 MG/1
TABLET, DELAYED RELEASE ORAL
Qty: 90 TABLET | Refills: 3 | Status: SHIPPED | OUTPATIENT
Start: 2018-05-25 | End: 2018-09-04 | Stop reason: SDUPTHER

## 2018-06-08 ENCOUNTER — TELEPHONE (OUTPATIENT)
Dept: FAMILY MEDICINE CLINIC | Facility: CLINIC | Age: 63
End: 2018-06-08

## 2018-06-08 NOTE — TELEPHONE ENCOUNTER
Patient should take Toprol XL 25 mg twice a day on Sunday  She should skip her morning dose on Monday on the day of colonoscopy  She should restart  Toprol XL on Monday night once colonoscopy is performed and once she starts eating regularly    Thank you

## 2018-06-08 NOTE — TELEPHONE ENCOUNTER
Patient called stating that she has a colonoscopy on Monday and would like to know if its ok to take her bp meds on Sunday and Monday?   Please call and advise

## 2018-06-26 ENCOUNTER — TELEPHONE (OUTPATIENT)
Dept: FAMILY MEDICINE CLINIC | Facility: CLINIC | Age: 63
End: 2018-06-26

## 2018-06-26 NOTE — TELEPHONE ENCOUNTER
Please contact patient  I received her blood pressure updates  Her blood pressures have improved but is still above desired level  I would like her to continue on Toprol XL 50 mg once a day but I would also recommend to retry low-dose of HCTZ  If patient is agreeable-I will send prescription to the pharmacy    Please let me know  Thank you

## 2018-06-26 NOTE — TELEPHONE ENCOUNTER
Spoke w/ pt and she is agreeable to starting low dose HCTZ and would like blood work to follow up after starting medication because she had a problem in the past  Please send into [facility]   Thanks

## 2018-06-27 NOTE — TELEPHONE ENCOUNTER
I spoke with patient  She has been under lot of stress with family and grandchildren visiting  She states that she has been forgetting to take her medication for hypertension at times  Her blood pressure last night was 128/71 with heart rate of 70  Blood pressure this morning was 137/76 with heart rate of 52  Patient could not tolerate Ace inhibitors and ARB is in the past   She did develop hypokalemia on low-dose of HCT Z  She does not recall any trouble with Norvasc  We would give her another 2 weeks on the same medication regimen  Patient will continue monitoring blood pressures and will update me at that time  She is considering weaning of estrogen  I made an emphasis that her blood pressure should be well controlled especially with ERT  Will finalize decision regarding BP meds in 2 weeks

## 2018-07-12 ENCOUNTER — TELEPHONE (OUTPATIENT)
Dept: FAMILY MEDICINE CLINIC | Facility: CLINIC | Age: 63
End: 2018-07-12

## 2018-07-25 ENCOUNTER — TELEPHONE (OUTPATIENT)
Dept: FAMILY MEDICINE CLINIC | Facility: CLINIC | Age: 63
End: 2018-07-25

## 2018-07-25 NOTE — TELEPHONE ENCOUNTER
----- Message from Ann Allen MD sent at 7/25/2018  5:26 PM EDT -----  Please contact patient    Her blood work is normal   Thanks

## 2018-08-28 ENCOUNTER — HOSPITAL ENCOUNTER (OUTPATIENT)
Dept: MAMMOGRAPHY | Facility: CLINIC | Age: 63
Discharge: HOME | End: 2018-08-28
Payer: COMMERCIAL

## 2018-08-28 DIAGNOSIS — R92.8 ABNORMAL MAMMOGRAM: ICD-10-CM

## 2018-08-28 PROCEDURE — 77066 DX MAMMO INCL CAD BI: CPT

## 2018-08-28 NOTE — PROGRESS NOTES
Met with patient and Dr Lu Dean regarding recommendation for;      __x___ RIGHT ______LEFT      _____Ultrasound guided  ___x___Stereotactic  Breast biopsy  ___x__Verbalized understanding        Blood thinners:  _____yes __x___no    Date stopped: ____n/a_______    Biopsy teaching sheet given:  ___x____yes ______no

## 2018-08-29 ENCOUNTER — HOSPITAL ENCOUNTER (OUTPATIENT)
Dept: MAMMOGRAPHY | Facility: CLINIC | Age: 63
Discharge: HOME | End: 2018-08-29
Payer: COMMERCIAL

## 2018-08-29 ENCOUNTER — HOSPITAL ENCOUNTER (OUTPATIENT)
Dept: MAMMOGRAPHY | Facility: CLINIC | Age: 63
Discharge: HOME | End: 2018-08-29
Admitting: RADIOLOGY
Payer: COMMERCIAL

## 2018-08-29 VITALS — SYSTOLIC BLOOD PRESSURE: 140 MMHG | DIASTOLIC BLOOD PRESSURE: 82 MMHG | HEART RATE: 76 BPM

## 2018-08-29 DIAGNOSIS — R92.8 ABNORMAL MAMMOGRAM: ICD-10-CM

## 2018-08-29 PROCEDURE — 88305 TISSUE EXAM BY PATHOLOGIST: CPT | Performed by: PATHOLOGY

## 2018-08-29 PROCEDURE — 19081 BX BREAST 1ST LESION STRTCTC: CPT

## 2018-08-29 PROCEDURE — 88342 IMHCHEM/IMCYTCHM 1ST ANTB: CPT | Performed by: PATHOLOGY

## 2018-08-29 PROCEDURE — 88341 IMHCHEM/IMCYTCHM EA ADD ANTB: CPT | Performed by: PATHOLOGY

## 2018-08-29 RX ORDER — LIDOCAINE HYDROCHLORIDE AND EPINEPHRINE BITARTRATE 20; .01 MG/ML; MG/ML
10 INJECTION, SOLUTION SUBCUTANEOUS ONCE
Status: COMPLETED | OUTPATIENT
Start: 2018-08-29 | End: 2018-08-29

## 2018-08-29 RX ORDER — LIDOCAINE HYDROCHLORIDE 10 MG/ML
5 INJECTION, SOLUTION INFILTRATION; PERINEURAL ONCE
Status: COMPLETED | OUTPATIENT
Start: 2018-08-29 | End: 2018-08-29

## 2018-08-29 RX ADMIN — LIDOCAINE HYDROCHLORIDE 5 ML: 10 INJECTION, SOLUTION INFILTRATION; PERINEURAL at 13:13

## 2018-08-29 RX ADMIN — LIDOCAINE HYDROCHLORIDE AND EPINEPHRINE 10 ML: 20; 10 INJECTION, SOLUTION INFILTRATION; PERINEURAL at 13:13

## 2018-08-29 NOTE — PROGRESS NOTES
Procedure type:    _____ultrasound guided __x___stereotactic    Breast:    _____Left __x___Right    Location:     Needle: 8g Mammotome    # of passes: 2 cores with cacs ( specimen A)  1 core without calcs (specimen B)    Clip: Mammomark-U shape    Performed by: Dr Adolfo Fields held for 5 minutes by: Adriel Ramirez    Steri Strips:    __x___yes _____no    Band aid:    _____yes__x___no    Tape and guaze:    __x___yes _____no    Tolerated procedure:    __x___yes _____no

## 2018-08-29 NOTE — DISCHARGE INSTR - OTHER ORDERS
POST LARGE CORE BREAST BIOPSY PATIENT INFORMATION      1  Place an ice pack inside your bra over the top of the dressing every hour for 20 minutes (20 minutes on, 60 minutes off)  Do this until bedtime  2  Do not shower or bathe until the following morning  3  You may bathe your breast carefully with the steri-strips in place  Be careful    Not to loosen them  The steri-strips will fall off in 3-5 days  4  You may have mild discomfort, and you may have some bruising where the   Needle entered the skin  This should clear within 5-7 days  5  If you need medicine for discomfort, take acetaminophen products such as   Tylenol  You may also take Advil or Motrin products  6  Do not participate in strenuous activities such as-tennis, aerobics, skiing,  Weight lifting, etc  for 24 hours  Refrain from swimming/soaking for 72 hours  7  Wearing a bra for sleeping may be more comfortable for the first 24-48 hours  8  Watch for continued bleeding, pain or fever over 101; please call with any questions or concerns  For procedures done at the [facility]  [place] [facility] [address] call:  Dhaval Can RN at [phone]  Dharmesh Pollack RN at [phone]                    *After 4 PM call the Interventional Radiology Department                    [phone] and ask to speak with the nurse on call  For procedures done at the [address] [place] [address] call:         Taylor Barthel RN at   *After 4 PM call the Interventional Radiology Department   [phone] and ask to speak with the nurse on call  For procedures done at [address] [place] [address] call: The Radiology Nurse at [phone]  *After 4 PM call your physician, or go to the Emergency Department  9          The final results of your biopsy are usually available within one week

## 2018-08-30 NOTE — PROGRESS NOTES
Post procedure call completed on 8/30/18 @ 1155    Bleeding: _____yes __x___no    Pain: _____yes ___x___no    Redness/Swelling: ______yes ___x___no    Band aid removed: _____yes _____no ___x___n/a ( not used)    Steri-Strips intact: ___x___yes _____no    Pt reports "just mild discomfort today"

## 2018-08-31 ENCOUNTER — TELEPHONE (OUTPATIENT)
Dept: MAMMOGRAPHY | Facility: CLINIC | Age: 63
End: 2018-08-31

## 2018-08-31 PROBLEM — N60.99 ATYPICAL LOBULAR HYPERPLASIA (ALH) OF BREAST: Status: ACTIVE | Noted: 2018-08-31

## 2018-08-31 NOTE — PROGRESS NOTES
Patient Past Medical History:  HTN; GERD; Hot Flashes;  Insomnia          Allergies:  Adhesives - Itching, redness        Past Breast History:     Previous Biopsy:   Yes______ No____x____      Breast: Right____ Left______       Malignant______ Benign_______      Treatments if applicable        Present Breast History:     Right____x______    Left_____________     Density_________    Calcifications______x______   Palpable______________      Patient notified of results on:  8/31/18    Date of Appointment with Surgeon Dr Mague Ruelas on 9/5/18 @ 12 noon

## 2018-09-04 DIAGNOSIS — K21.9 GASTROESOPHAGEAL REFLUX DISEASE, ESOPHAGITIS PRESENCE NOT SPECIFIED: ICD-10-CM

## 2018-09-04 RX ORDER — RABEPRAZOLE SODIUM 20 MG/1
20 TABLET, DELAYED RELEASE ORAL DAILY
Qty: 90 TABLET | Refills: 3 | Status: SHIPPED | OUTPATIENT
Start: 2018-09-04 | End: 2019-03-03 | Stop reason: SDUPTHER

## 2018-09-04 RX ORDER — RABEPRAZOLE SODIUM 20 MG/1
20 TABLET, DELAYED RELEASE ORAL DAILY
Qty: 90 TABLET | Refills: 0 | Status: CANCELLED | OUTPATIENT
Start: 2018-09-04

## 2018-09-04 NOTE — TELEPHONE ENCOUNTER
----- Message from Chrissy Vines sent at 9/4/2018  2:36 PM EDT -----  Regarding: RE:Your Recent Visit  Contact: Earl Cotton,  I was planning on dropping off my blood pressure #s to you last Tuesday after my mammogram at the [facility] breast center, but got some disappointing news from them and life has been a little hectic these days  Seeing Dr Sharla Douglass tomorrow to discuss my options for my right breast       I am enclosing my blood pressure readings for you in this email  7-15-18   128/71       pulse 63      time 4:00pm  7-16-18   125/71                   61                 4:30pm  7-18-18   130/69                   57                 9:40pm  7-24-18   137/70                   70                 9:00pm  7-24-18   131/72                   67                 9:10pm  7-25-18   131/77                   65                 3:40pm  7-28-18   136/76                   62                 5:09pm  8-2-18      129/73                  68                  230pm  8-27-18    125/68                  65                 8:20pm  8-28-18    125/71                  50                 8:00am   Nicko Menon    ----- Message -----  From: Kamala Hernández MD  Sent: 7/13/2018  6:41 AM EDT  To: Chrissy Vines  Subject: RE:Your Recent Visit  Kendrick Quinones,    Blood pressures look pretty good! Please continue same medication regimen  Please keep me posted again in 3-4 weeks  Hope all is well!   Dr SLATER    ----- Message -----     From: Chrissy Vines     Sent: 7/12/2018 12:38 PM EDT       To: Kamala Hernández MD  Subject: RE:Your Recent Visit    Here are my new blood pressure checks per our conversation on June 27, 2018 6-    128/71    pulse 70   9:05pm  6-    137/76        52           8:28am  6-    134/80        65           5:40pm  6-    130/79        60           11:38am  6-     135/76       57           11:21am  7-1-2018       131/70       62 9:06pm  7-5-2018       123/73       60              9:25am  7-7-2018       133/75       67              2:55pm  7-9-2018       145/76       61              9:07pm  7-9-2018       130/71       61              9:10pm  7-    139/75       58               3:26pm  7-    132/77       59               3:29pm  7-    126/76       62             10:36am  Hopefully you are getting these readings, I will be waiting to hear from you in the next couple of days  I'll also give the office a call to let them know that I posted them on [employer]  Thanks,  Carloz Leon  ----- Message -----  From: Mandeep Higgins MD  Sent: 5/25/2018 11:21 PM EDT  To: Eloina Garcia  Subject: Your Recent Visit  Eloina Garcia, you have a new After Visit Summary in [address]  Please click on visits and then your most recent appointment, to view your After Visit Summary  If you are experiencing any technical issues please call our patient service desk at [phone]) option [phone]

## 2018-09-05 ENCOUNTER — OFFICE VISIT (OUTPATIENT)
Dept: SURGICAL ONCOLOGY | Facility: CLINIC | Age: 63
End: 2018-09-05
Payer: COMMERCIAL

## 2018-09-05 VITALS
HEART RATE: 60 BPM | DIASTOLIC BLOOD PRESSURE: 90 MMHG | RESPIRATION RATE: 16 BRPM | SYSTOLIC BLOOD PRESSURE: 142 MMHG | WEIGHT: 193 LBS | HEIGHT: 66 IN | BODY MASS INDEX: 31.02 KG/M2 | TEMPERATURE: 98 F

## 2018-09-05 DIAGNOSIS — D05.01 BREAST NEOPLASM, TIS (LCIS), RIGHT: ICD-10-CM

## 2018-09-05 DIAGNOSIS — N60.99 ATYPICAL LOBULAR HYPERPLASIA OF BREAST: Primary | ICD-10-CM

## 2018-09-05 PROCEDURE — 99244 OFF/OP CNSLTJ NEW/EST MOD 40: CPT | Performed by: SURGERY

## 2018-09-05 RX ORDER — LORATADINE 10 MG/1
10 TABLET ORAL DAILY
COMMUNITY
End: 2021-11-08

## 2018-09-05 RX ORDER — OXYCODONE HYDROCHLORIDE AND ACETAMINOPHEN 5; 325 MG/1; MG/1
1 TABLET ORAL EVERY 6 HOURS PRN
Qty: 6 TABLET | Refills: 0 | Status: SHIPPED | OUTPATIENT
Start: 2018-09-05 | End: 2018-11-23

## 2018-09-05 RX ORDER — MEDROXYPROGESTERONE ACETATE 2.5 MG/1
2.5 TABLET ORAL DAILY
COMMUNITY
End: 2018-10-08

## 2018-09-05 NOTE — LETTER
September 5, 2018     Noah Smith MD  [address] [facility] [address]    Patient: Rayna Castro   YOB: 1955   Date of Visit: 9/5/2018       Dear Dr Marisela Haile: Thank you for referring Sheyla Hernandez to me for evaluation  Below are my notes for this consultation  If you have questions, please do not hesitate to call me  I look forward to following your patient along with you  Sincerely,        Margarita Richter MD        CC: No Recipients  Margarita Richter MD  9/5/2018  1:04 PM  Sign at close encounter               Surgical Oncology Consult Note       [address] [facility]  1955  [id number]  [address] [facility],6[address]  [facility] [address] [address]      Chief Complaint:     Chief Complaint   Patient presents with    Consult     atypical lobular hyperplasia       Assessment and Plan:   Assessment/Plan   Patient has been followed for microcalcifications in recently had a bilateral diagnostic mammogram which demonstrated changing calcifications in the upper outer quadrant of the right breast   This was biopsied and shown to have a 3 mm intraductal papilloma, atypical lobular hyperplasia and LCIS  There was no invasive carcinoma identified  The original clip did not deploy a however after deployment of the 2nd clipped both clips were present  Patient presents now for discussion regarding further management  We had a long discussion regarding her pathology I provided her a copy of report  She has a normal clinical breast exam with the exception of a well-healed biopsy Jase  I have recommended a needle localization lumpectomy of this area to exclude an underlying malignancy  We went through the process of informed consent for a right breast needle localization lumpectomy    All questions were answered the patient's satisfaction  I explained further discussions regarding possible elevated risk or further treatments would be discussed at her postoperative visit  Oncology History:      No history exists  History of Present Illness:   See above    Review of Systems:   Review of Systems   Constitutional: Negative for activity change, appetite change and fatigue  HENT: Negative  Eyes: Negative  Respiratory: Negative for cough, shortness of breath and wheezing  Cardiovascular: Negative for chest pain and leg swelling  Gastrointestinal: Negative  Endocrine: Negative  Genitourinary: Negative  Musculoskeletal:        No new changes or complaints of bone pain   Skin: Negative  Allergic/Immunologic: Negative  Neurological: Negative  Hematological: Negative  Psychiatric/Behavioral: Negative          Past Medical History:      Patient Active Problem List   Diagnosis    Hypertension    Gastroesophageal reflux disease    Insomnia    History of colon polyps    Atypical lobular hyperplasia (ALH) of breast        Past Medical History:   Diagnosis Date    Arthritis     Cancer ([address] )     skin    Chest pain     Colon polyps     GERD (gastroesophageal reflux disease)     Hypertension     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         Past Surgical History:   Procedure Laterality Date    COLONOSCOPY  03/2013    repeat in 5 yrs,  Dr Angela Buenrostro      Nissen fundoplication    ESOPHAGOGASTRODUODENOSCOPY      FOOT SURGERY      FUNDOPLICATION TRANSORAL      HERNIA REPAIR      MAMMO STEREOTACTIC BREAST BIOPSY RIGHT (ALL INC) Right 8/29/2018        Family History   Problem Relation Age of Onset    Diabetes Mother     Heart disease Mother     Osteoporosis Mother     Heart disease Father     Skin cancer Father     Cancer Family     Breast cancer Paternal Aunt         Social History     Social History    Marital status: /Civil Union     Spouse name: N/A    Number of children: N/A    Years of education: N/A     Occupational History    Not on file       Social History Main Topics    Smoking status: Never Smoker    Smokeless tobacco: Never Used    Alcohol use 1 2 oz/week     2 Standard drinks or equivalent per week    Drug use: No    Sexual activity: Not on file     Other Topics Concern    Not on file     Social History Narrative    No narrative on file        Current Outpatient Prescriptions:     Calcium-Magnesium-Vitamin D (CITRACAL CALCIUM+D PO), Take by mouth, Disp: , Rfl:     Cholecalciferol (VITAMIN D) 2000 units CAPS, Take 1 tablet by mouth daily, Disp: , Rfl:     docusate sodium (CVS STOOL SOFTENER) 100 mg capsule, Take 1 capsule by mouth daily, Disp: , Rfl:     estradiol (ESTRACE) 0 5 MG tablet, Take 1 tablet by mouth daily, Disp: , Rfl:     Flaxseed, Linseed, (FLAXSEED OIL PO), Take 1,400 mg by mouth daily 1400 mg daily , Disp: , Rfl:     fluticasone (FLONASE) 50 mcg/act nasal spray, 2 sprays into each nostril daily, Disp: , Rfl:     loratadine (CLARITIN) 10 mg tablet, Take 10 mg by mouth daily, Disp: , Rfl:     Magnesium 250 MG TABS, Take 250 mg by mouth 2 (two) times a day, Disp: , Rfl:     medroxyPROGESTERone (PROVERA) 2 5 mg tablet, Take 2 5 mg by mouth daily, Disp: , Rfl:     metoprolol succinate (TOPROL-XL) 25 mg 24 hr tablet, Take 1 tablet (25 mg total) by mouth 2 (two) times a day, Disp: 180 tablet, Rfl: 2    Multiple Vitamins-Minerals (WOMENS 50+ MULTI VITAMIN/MIN PO), Take by mouth, Disp: , Rfl:     RABEprazole (ACIPHEX) 20 MG tablet, Take 1 tablet (20 mg total) by mouth daily, Disp: 90 tablet, Rfl: 3    zolpidem (AMBIEN) 10 mg tablet, Take 1 tablet (10 mg total) by mouth as needed (as needed), Disp: 30 tablet, Rfl: 0     Allergies   Allergen Reactions    Ace Inhibitors Cough    Adhesive [Medical Tape] Itching       Physical Exam:     Vitals:    09/05/18 1229   BP: 142/90   Pulse: 60   Resp: 16   Temp: 98 °F (36 7 °C)     Physical Exam   Constitutional: She is oriented to person, place, and time  She appears well-developed and well-nourished  HENT:   Head: Normocephalic and atraumatic  Mouth/Throat: Oropharynx is clear and moist    Eyes: EOM are normal  Pupils are equal, round, and reactive to light  Neck: Normal range of motion  Neck supple  No JVD present  No tracheal deviation present  No thyromegaly present  Cardiovascular: Normal rate, regular rhythm, normal heart sounds and intact distal pulses  Exam reveals no gallop and no friction rub  No murmur heard  Pulmonary/Chest: Effort normal and breath sounds normal  No respiratory distress  She has no wheezes  She has no rales  Examination of the right breast demonstrates a well-healed biopsy marked  There are no dominant masses worrisome skin findings nipple discharge axillary adenopathy  Examination of the left breast was entirely normal in both the sitting and supine position  Abdominal: Soft  She exhibits no distension and no mass  There is no hepatomegaly  There is no tenderness  There is no rebound and no guarding  Musculoskeletal: Normal range of motion  She exhibits no edema or tenderness  Lymphadenopathy:     She has no cervical adenopathy  Neurological: She is alert and oriented to person, place, and time  No cranial nerve deficit  Skin: Skin is warm and dry  No rash noted  No erythema  Psychiatric: She has a normal mood and affect  Her behavior is normal    Vitals reviewed  Results:   Pathology:  I reviewed her pathology report demonstrating the above findings  Imaging  I reviewed her post biopsy films I concur with report  Discussion/Summary:   See above    Advance Care Planning/Advance Directives:  I discussed the disease status, treatment plans and follow-up with the patient

## 2018-09-05 NOTE — PATIENT INSTRUCTIONS
Pre-Surgery Instructions:   Medication Instructions    Calcium-Magnesium-Vitamin D (CITRACAL CALCIUM+D PO) Stop taking 1 days prior to surgery    Cholecalciferol (VITAMIN D) 2000 units CAPS Stop taking 1 days prior to surgery    docusate sodium (CVS STOOL SOFTENER) 100 mg capsule Stop taking 1 days prior to surgery    estradiol (ESTRACE) 0 5 MG tablet Stop taking 1 days prior to surgery    Flaxseed, Linseed, (FLAXSEED OIL PO) Stop taking one week prior to surgery    fluticasone (FLONASE) 50 mcg/act nasal spray Take morning of surgery    loratadine (CLARITIN) 10 mg tablet Stop taking 1 days prior to surgery    Magnesium 250 MG TABS Stop taking 1 days prior to surgery    medroxyPROGESTERone (PROVERA) 2 5 mg tablet Stop taking 1 days prior to surgery    metoprolol succinate (TOPROL-XL) 25 mg 24 hr tablet Take morning of surgery    Multiple Vitamins-Minerals (WOMENS 50+ MULTI VITAMIN/MIN PO) Stop taking 1 days prior to surgery    RABEprazole (ACIPHEX) 20 MG tablet Stop taking 1 days prior to surgery    zolpidem (AMBIEN) 10 mg tablet Stop taking 1 days prior to surgery         Breast Lumpectomy   AMBULATORY CARE:   What you need to know about a lumpectomy:  A lumpectomy is surgery to remove a mass in your breast  Breast tissue that surrounds the mass may also be taken  A lumpectomy is also known as breast-conserving surgery, a partial mastectomy, or a segmental mastectomy  How to prepare for a lumpectomy:   · You may need a mammogram or ultrasound before surgery  These tests may be done the same day as your surgery or at an earlier time  Your healthcare provider may use pictures from these tests to estella the location of the mass  The marker will show him where to make your incision  · Your healthcare provider will talk to you about how to prepare for surgery  He may tell you not to eat or drink anything after midnight on the day of your surgery   He will tell you what medicines to take or not take on the day of your surgery  You may need to stop taking blood thinners or aspirin several days before your surgery  Arrange for someone to drive you home and stay with you for 24 hours after surgery  This person can help care for you, and monitor for any problems  What will happen during a lumpectomy:  You will be given general anesthesia to keep you asleep and free from pain during surgery  You may be given an antibiotic through your IV to help prevent a bacterial infection  Your healthcare provider will make an incision in your breast and remove the mass  He may also remove breast tissue or lymph nodes that are close to the mass  A drain may be inserted near your incision to remove extra fluid  This will decrease swelling and help your incision heal  Your healthcare provider will close your incision with stitches or strips of medical tape and cover it with a bandage  He may also wrap a tight-fitting bandage around both of your breasts  This may decrease swelling, bleeding, and pain  What will happen after a lumpectomy:  Healthcare providers will monitor you until you are awake  You may able to go home when you are awake and your pain is controlled  Instead you may need to spend the night in the hospital    Risks of a lumpectomy:  You may bleed more than expected or get an infection  Nerves, blood vessels, and muscles may be damaged during your surgery  You may have swelling in your arm closest to the lumpectomy or where lymph nodes were removed  This swelling is called lymphedema  Lymphedema may cause tingling, numbness, stiffness, and weakness in your arm  This may be permanent  You may get a blood clot in your arm or leg  The blood clot may travel to your heart lungs, or brain  This may become life-threatening  Call 911 for any of the following:   · You feel lightheaded, short of breath, and have chest pain  · You cough up blood  · You have trouble breathing    Seek care immediately if:   · Blood soaks through your bandage  · Your stitches come apart  · Your bruise suddenly gets bigger  · Your leg or arm is larger than normal and painful  Contact your healthcare provider if:   · You have a fever or chills  · Your wound is red, swollen, or draining pus  · You have nausea or are vomiting  · Your skin is itchy, swollen, or you have a rash  · Your pain does not get better after you take pain medicine  · Your drain falls out or stops draining fluid  · Your drain has pus or foul-smelling fluid coming out of it  · You have questions or concerns about your condition or care  Medicines: You may need any of the following:  · Antibiotics  help prevent a bacterial infection  · Prescription pain medicine  may be given  Ask your healthcare provider how to take this medicine safely  Some prescription pain medicines contain acetaminophen  Do not take other medicines that contain acetaminophen without talking to your healthcare provider  Too much acetaminophen may cause liver damage  Prescription pain medicine may cause constipation  Ask your healthcare provider how to prevent or treat constipation  · NSAIDs , such as ibuprofen, help decrease swelling, pain, and fever  NSAIDs can cause stomach bleeding or kidney problems in certain people  If you take blood thinner medicine, always ask your healthcare provider if NSAIDs are safe for you  Always read the medicine label and follow directions  · Take your medicine as directed  Contact your healthcare provider if you think your medicine is not helping or if you have side effects  Tell him or her if you are allergic to any medicine  Keep a list of the medicines, vitamins, and herbs you take  Include the amounts, and when and why you take them  Bring the list or the pill bottles to follow-up visits  Carry your medicine list with you in case of an emergency  Care for your wound as directed:   If you have a tight-fitting bandage, you can remove it in 24 to 48 hours, or as directed  Ask your healthcare provider when your incision can get wet  You may need to take a sponge bath until your drain is removed  Carefully wash around the incision with soap and water  It is okay to allow the soap and water to gently run over your incision  Gently pat dry the area and put on new, clean bandages as directed  Change your bandages when they get wet or dirty  Check your incision every day for redness, pus, or swelling  Self-care:   · Apply ice  on your breast for 15 to 20 minutes every hour or as directed  Use an ice pack, or put crushed ice in a plastic bag  Cover it with a towel  Ice helps prevent tissue damage and decreases swelling and pain  · Rest  as directed  Do not lift anything heavy  Do not push or pull with your arms  Take short walks around the house  Gradually walk further as you feel better  Ask your healthcare provider when you can return to your normal activities  · Empty your drain  as directed  You may need to write down how much you empty from your drain each day  Ask your healthcare provider for more information about how to empty your drain  · Wear a supportive bra  as directed  Wait until you remove the tight-fitting bandage to wear a bra  You may be given a surgical bra or told to wear a sports bra  A supportive bra may help hold your bandages in place  It may also help with swelling and pain  Do not  wear bras with lace or underwire  They may rub against your incision and cause discomfort  Arm stretches: Your healthcare provider may show you how to do arm stretches  Arm stretches may prevent stiff arms or shoulders  You may need to wait until after your drains are removed to begin stretching  Do not do arm stretches until your healthcare provider says it is okay  Ask your healthcare provider for more information about arm stretches     Follow up with your healthcare provider as directed:  Write down your questions so you remember to ask them during your visits  © 2017 [employer] Information is for End User's use only and may not be sold, redistributed or otherwise used for commercial purposes  All illustrations and images included in CareNotes® are the copyrighted property of [employer]  or [employer]  The above information is an  only  It is not intended as medical advice for individual conditions or treatments  Talk to your doctor, nurse or pharmacist before following any medical regimen to see if it is safe and effective for you

## 2018-09-05 NOTE — PROGRESS NOTES
Surgical Oncology Consult Note       [address]  [facility] [place]  [address] [facility],[address] [facility] [address] [facility]  1955  [id number]  [address] [facility]  [facility] [address] [address]      Chief Complaint:     Chief Complaint   Patient presents with    Consult     atypical lobular hyperplasia       Assessment and Plan:   Assessment/Plan   Patient has been followed for microcalcifications in recently had a bilateral diagnostic mammogram which demonstrated changing calcifications in the upper outer quadrant of the right breast   This was biopsied and shown to have a 3 mm intraductal papilloma, atypical lobular hyperplasia and LCIS  There was no invasive carcinoma identified  The original clip did not deploy a however after deployment of the 2nd clipped both clips were present  Patient presents now for discussion regarding further management  We had a long discussion regarding her pathology I provided her a copy of report  She has a normal clinical breast exam with the exception of a well-healed biopsy Jase  I have recommended a needle localization lumpectomy of this area to exclude an underlying malignancy  We went through the process of informed consent for a right breast needle localization lumpectomy  All questions were answered the patient's satisfaction  I explained further discussions regarding possible elevated risk or further treatments would be discussed at her postoperative visit  Oncology History:      No history exists  History of Present Illness:   See above    Review of Systems:   Review of Systems   Constitutional: Negative for activity change, appetite change and fatigue  HENT: Negative  Eyes: Negative  Respiratory: Negative for cough, shortness of breath and wheezing  Cardiovascular: Negative for chest pain and leg swelling  Gastrointestinal: Negative  Endocrine: Negative  Genitourinary: Negative  Musculoskeletal:        No new changes or complaints of bone pain   Skin: Negative  Allergic/Immunologic: Negative  Neurological: Negative  Hematological: Negative  Psychiatric/Behavioral: Negative  Past Medical History:      Patient Active Problem List   Diagnosis    Hypertension    Gastroesophageal reflux disease    Insomnia    History of colon polyps    Atypical lobular hyperplasia (ALH) of breast        Past Medical History:   Diagnosis Date    Arthritis     Cancer ([facility] [address] )     skin    Chest pain     Colon polyps     GERD (gastroesophageal reflux disease)     Hypertension     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         Past Surgical History:   Procedure Laterality Date    COLONOSCOPY  03/2013    repeat in 5 yrs,  Dr Alexandria Millan      Nissen fundoplication    ESOPHAGOGASTRODUODENOSCOPY      FOOT SURGERY      FUNDOPLICATION TRANSORAL      HERNIA REPAIR      MAMMO STEREOTACTIC BREAST BIOPSY RIGHT (ALL INC) Right 8/29/2018        Family History   Problem Relation Age of Onset    Diabetes Mother     Heart disease Mother     Osteoporosis Mother     Heart disease Father     Skin cancer Father     Cancer Family     Breast cancer Paternal Aunt         Social History     Social History    Marital status: /Civil Union     Spouse name: N/A    Number of children: N/A    Years of education: N/A     Occupational History    Not on file       Social History Main Topics    Smoking status: Never Smoker    Smokeless tobacco: Never Used    Alcohol use 1 2 oz/week     2 Standard drinks or equivalent per week    Drug use: No    Sexual activity: Not on file     Other Topics Concern    Not on file     Social History Narrative    No narrative on file        Current Outpatient Prescriptions:     Calcium-Magnesium-Vitamin D (CITRACAL CALCIUM+D PO), Take by mouth, Disp: , Rfl:     Cholecalciferol (VITAMIN D) 2000 units CAPS, Take 1 tablet by mouth daily, Disp: , Rfl:     docusate sodium (CVS STOOL SOFTENER) 100 mg capsule, Take 1 capsule by mouth daily, Disp: , Rfl:     estradiol (ESTRACE) 0 5 MG tablet, Take 1 tablet by mouth daily, Disp: , Rfl:     Flaxseed, Linseed, (FLAXSEED OIL PO), Take 1,400 mg by mouth daily 1400 mg daily , Disp: , Rfl:     fluticasone (FLONASE) 50 mcg/act nasal spray, 2 sprays into each nostril daily, Disp: , Rfl:     loratadine (CLARITIN) 10 mg tablet, Take 10 mg by mouth daily, Disp: , Rfl:     Magnesium 250 MG TABS, Take 250 mg by mouth 2 (two) times a day, Disp: , Rfl:     medroxyPROGESTERone (PROVERA) 2 5 mg tablet, Take 2 5 mg by mouth daily, Disp: , Rfl:     metoprolol succinate (TOPROL-XL) 25 mg 24 hr tablet, Take 1 tablet (25 mg total) by mouth 2 (two) times a day, Disp: 180 tablet, Rfl: 2    Multiple Vitamins-Minerals (WOMENS 50+ MULTI VITAMIN/MIN PO), Take by mouth, Disp: , Rfl:     RABEprazole (ACIPHEX) 20 MG tablet, Take 1 tablet (20 mg total) by mouth daily, Disp: 90 tablet, Rfl: 3    zolpidem (AMBIEN) 10 mg tablet, Take 1 tablet (10 mg total) by mouth as needed (as needed), Disp: 30 tablet, Rfl: 0     Allergies   Allergen Reactions    Ace Inhibitors Cough    Adhesive [Medical Tape] Itching       Physical Exam:     Vitals:    09/05/18 1229   BP: 142/90   Pulse: 60   Resp: 16   Temp: 98 °F (36 7 °C)     Physical Exam   Constitutional: She is oriented to person, place, and time  She appears well-developed and well-nourished  HENT:   Head: Normocephalic and atraumatic  Mouth/Throat: Oropharynx is clear and moist    Eyes: EOM are normal  Pupils are equal, round, and reactive to light  Neck: Normal range of motion  Neck supple  No JVD present  No tracheal deviation present  No thyromegaly present  Cardiovascular: Normal rate, regular rhythm, normal heart sounds and intact distal pulses    Exam reveals no gallop and no friction rub  No murmur heard  Pulmonary/Chest: Effort normal and breath sounds normal  No respiratory distress  She has no wheezes  She has no rales  Examination of the right breast demonstrates a well-healed biopsy marked  There are no dominant masses worrisome skin findings nipple discharge axillary adenopathy  Examination of the left breast was entirely normal in both the sitting and supine position  Abdominal: Soft  She exhibits no distension and no mass  There is no hepatomegaly  There is no tenderness  There is no rebound and no guarding  Musculoskeletal: Normal range of motion  She exhibits no edema or tenderness  Lymphadenopathy:     She has no cervical adenopathy  Neurological: She is alert and oriented to person, place, and time  No cranial nerve deficit  Skin: Skin is warm and dry  No rash noted  No erythema  Psychiatric: She has a normal mood and affect  Her behavior is normal    Vitals reviewed  Results:   Pathology:  I reviewed her pathology report demonstrating the above findings  Imaging  I reviewed her post biopsy films I concur with report  Discussion/Summary:   See above    Advance Care Planning/Advance Directives:  I discussed the disease status, treatment plans and follow-up with the patient

## 2018-09-06 ENCOUNTER — CLINICAL SUPPORT (OUTPATIENT)
Dept: FAMILY MEDICINE CLINIC | Facility: CLINIC | Age: 63
End: 2018-09-06
Payer: COMMERCIAL

## 2018-09-06 VITALS — TEMPERATURE: 96.7 F

## 2018-09-06 DIAGNOSIS — Z23 NEED FOR INFLUENZA VACCINATION: Primary | ICD-10-CM

## 2018-09-06 DIAGNOSIS — G47.00 INSOMNIA, UNSPECIFIED TYPE: ICD-10-CM

## 2018-09-06 PROBLEM — D05.01 BREAST NEOPLASM, TIS (LCIS), RIGHT: Status: ACTIVE | Noted: 2018-09-06

## 2018-09-06 PROBLEM — N60.99 ATYPICAL LOBULAR HYPERPLASIA OF BREAST: Status: ACTIVE | Noted: 2018-09-06

## 2018-09-06 PROCEDURE — 90682 RIV4 VACC RECOMBINANT DNA IM: CPT | Performed by: FAMILY MEDICINE

## 2018-09-06 PROCEDURE — 90471 IMMUNIZATION ADMIN: CPT | Performed by: FAMILY MEDICINE

## 2018-09-06 RX ORDER — ZOLPIDEM TARTRATE 10 MG/1
10 TABLET ORAL AS NEEDED
Qty: 30 TABLET | Refills: 0 | Status: SHIPPED | OUTPATIENT
Start: 2018-09-06 | End: 2019-03-03 | Stop reason: SDUPTHER

## 2018-10-08 RX ORDER — SACCHAROMYCES BOULARDII 250 MG
250 CAPSULE ORAL 2 TIMES DAILY
COMMUNITY
End: 2018-12-21 | Stop reason: ALTCHOICE

## 2018-10-08 NOTE — PRE-PROCEDURE INSTRUCTIONS
Pre-Surgery Instructions:   Medication Instructions    Calcium-Magnesium-Vitamin D (CITRACAL CALCIUM+D PO) Patient was instructed by Physician and understands   Cholecalciferol (VITAMIN D) 2000 units CAPS Patient was instructed by Physician and understands   docusate sodium (CVS STOOL SOFTENER) 100 mg capsule Instructed patient per Anesthesia Guidelines   Flaxseed, Linseed, (FLAXSEED OIL PO) Patient was instructed by Physician and understands   fluticasone (FLONASE) 50 mcg/act nasal spray Instructed patient per Anesthesia Guidelines   loratadine (CLARITIN) 10 mg tablet Instructed patient per Anesthesia Guidelines   Magnesium 250 MG TABS Patient was instructed by Physician and understands   metoprolol succinate (TOPROL-XL) 25 mg 24 hr tablet Instructed patient per Anesthesia Guidelines   Multiple Vitamins-Minerals (WOMENS 50+ MULTI VITAMIN/MIN PO) Patient was instructed by Physician and understands   RABEprazole (ACIPHEX) 20 MG tablet Instructed patient per Anesthesia Guidelines   saccharomyces boulardii (FLORASTOR) 250 mg capsule Instructed patient per Anesthesia Guidelines   zolpidem (AMBIEN) 10 mg tablet Instructed patient per Anesthesia Guidelines  Pre op and bathing instructions reviewed   Pt has hibiclens

## 2018-10-09 ENCOUNTER — OFFICE VISIT (OUTPATIENT)
Dept: SURGICAL ONCOLOGY | Facility: CLINIC | Age: 63
End: 2018-10-09

## 2018-10-09 ENCOUNTER — OFFICE VISIT (OUTPATIENT)
Dept: LAB | Facility: CLINIC | Age: 63
End: 2018-10-09
Payer: COMMERCIAL

## 2018-10-09 ENCOUNTER — TRANSCRIBE ORDERS (OUTPATIENT)
Dept: LAB | Facility: CLINIC | Age: 63
End: 2018-10-09

## 2018-10-09 ENCOUNTER — APPOINTMENT (OUTPATIENT)
Dept: RADIOLOGY | Facility: CLINIC | Age: 63
End: 2018-10-09
Payer: COMMERCIAL

## 2018-10-09 VITALS
RESPIRATION RATE: 16 BRPM | SYSTOLIC BLOOD PRESSURE: 160 MMHG | WEIGHT: 195 LBS | TEMPERATURE: 98.7 F | DIASTOLIC BLOOD PRESSURE: 86 MMHG | HEART RATE: 84 BPM | BODY MASS INDEX: 31.34 KG/M2 | HEIGHT: 66 IN

## 2018-10-09 DIAGNOSIS — N60.99 ATYPICAL LOBULAR HYPERPLASIA OF BREAST: ICD-10-CM

## 2018-10-09 DIAGNOSIS — D05.01 BREAST NEOPLASM, TIS (LCIS), RIGHT: ICD-10-CM

## 2018-10-09 DIAGNOSIS — N60.99 ATYPICAL LOBULAR HYPERPLASIA (ALH) OF BREAST: Primary | ICD-10-CM

## 2018-10-09 LAB
ATRIAL RATE: 56 BPM
P AXIS: 60 DEGREES
PR INTERVAL: 150 MS
QRS AXIS: 27 DEGREES
QRSD INTERVAL: 90 MS
QT INTERVAL: 432 MS
QTC INTERVAL: 416 MS
T WAVE AXIS: 26 DEGREES
VENTRICULAR RATE: 56 BPM

## 2018-10-09 PROCEDURE — 93010 ELECTROCARDIOGRAM REPORT: CPT | Performed by: INTERNAL MEDICINE

## 2018-10-09 PROCEDURE — 93005 ELECTROCARDIOGRAM TRACING: CPT

## 2018-10-09 PROCEDURE — 71046 X-RAY EXAM CHEST 2 VIEWS: CPT

## 2018-10-09 PROCEDURE — PREOP: Performed by: NURSE PRACTITIONER

## 2018-10-09 NOTE — PROGRESS NOTES
Surgical Oncology Follow Up       [address]  [facility] DE[facility]RE Ron [address] [place] [address] [address] [facility]  1955  [id number]      Chief Complaint   Patient presents with    Pre-op Exam     Atypical lobular hyperplasia of breast        Assessment/Plan:  1  Atypical lobular hyperplasia [facility]) of breast  - Surgery with Dr Merrick King 10/23     2  Breast neoplasm, Tis (LCIS), right  - Surgery with Dr Merrick King 10/23        Discussion/Summary:   Patient is a 47-year-old female who presented to our office after a diagnosis of right breast LCIS and [place]  She underwent a bilateral diagnostic mammogram in August 2018 which revealed new indeterminate microcalcification cluster in the posterior right upper outer quadrant  She underwent a stereotactic biopsy on August 29th  Pathology revealed an intraductal papilloma (3 mm) with usual ductal hyperplasia, focal ALH, 3 millimeters and LCIS  She met with Dr Merrick King and has consented for a needle loc lumpectomy  This is scheduled for October 23, 2018  She presents today for a preoperative history and physical  She has no new complaints today aside from a common cold  Reports a mild cough but no SOB  EKG and CXR performed this morning, results pending  No worrisome physical exam findings  I have instructed the patient to call with any new symptoms or concerns prior to her surgery  We will obtain her recent blood work from an outside lab  All of her and her 's questions were answered  History of Present Illness:     -Interval History:   Patient presents today for a preoperative history and physical   She has no new complaints today aside from a common cold which is resolving  She denies headaches, back pain, bone pain, shortness of breath, abdominal pain  She is scheduled for a lumpectomy with Dr Merrick King on October 23rd  She completed her chest x-ray and EKG this morning, results pending    She completed her labs in September and our office is coordinating obtaining the results  Review of Systems:  Review of Systems   Constitutional: Negative for activity change, appetite change, chills, fatigue, fever and unexpected weight change  HENT: Negative for trouble swallowing  Eyes: Negative for pain, redness and visual disturbance  Respiratory: Negative for cough, shortness of breath and wheezing  Cardiovascular: Negative for chest pain, palpitations and leg swelling  Gastrointestinal: Negative for abdominal pain, constipation, diarrhea, nausea and vomiting  Endocrine: Negative for cold intolerance and heat intolerance  Musculoskeletal: Negative for arthralgias, back pain, gait problem and myalgias  Skin: Negative for color change and rash  Neurological: Negative for dizziness, syncope, light-headedness, numbness and headaches  Hematological: Negative for adenopathy  Psychiatric/Behavioral: Negative for agitation and confusion  All other systems reviewed and are negative        Patient Active Problem List   Diagnosis    Hypertension    Gastroesophageal reflux disease    Insomnia    History of colon polyps    Atypical lobular hyperplasia (ALH) of breast    Atypical lobular hyperplasia of breast    Breast neoplasm, Tis (LCIS), right     Past Medical History:   Diagnosis Date    Arthritis     Cancer ([facility] [address] )     skin    Chest pain     Colon polyps     GERD (gastroesophageal reflux disease)     Hypertension     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      Past Surgical History:   Procedure Laterality Date    CARPAL TUNNEL RELEASE Left     CTR    COLONOSCOPY  03/2013    repeat in 5 yrs,  Dr Sanchez Cade      Nissen fundoplication    ESOPHAGOGASTRODUODENOSCOPY      FOOT SURGERY Left     plantar fascia     FUNDOPLICATION TRANSORAL      HERNIA REPAIR  2010    MAMMO STEREOTACTIC BREAST BIOPSY RIGHT (ALL INC) Right 8/29/2018    POLYPECTOMY  2008    ulcerated polypectomy     TONSILLECTOMY      TUBAL LIGATION       Family History   Problem Relation Age of Onset    Diabetes Mother     Heart disease Mother     Osteoporosis Mother     Heart disease Father     Skin cancer Father     Cancer Family     Breast cancer Paternal Aunt      Social History     Social History    Marital status: /Civil Union     Spouse name: N/A    Number of children: N/A    Years of education: N/A     Occupational History    Not on file       Social History Main Topics    Smoking status: Never Smoker    Smokeless tobacco: Never Used    Alcohol use Yes      Comment: socially    Drug use: No    Sexual activity: Not on file     Other Topics Concern    Not on file     Social History Narrative    No narrative on file       Current Outpatient Prescriptions:     Calcium-Magnesium-Vitamin D (CITRACAL CALCIUM+D PO), Take by mouth, Disp: , Rfl:     Cholecalciferol (VITAMIN D) 2000 units CAPS, Take 1 tablet by mouth daily, Disp: , Rfl:     docusate sodium (CVS STOOL SOFTENER) 100 mg capsule, Take 1 capsule by mouth daily, Disp: , Rfl:     Flaxseed, Linseed, (FLAXSEED OIL PO), Take 1,400 mg by mouth daily 1400 mg daily , Disp: , Rfl:     fluticasone (FLONASE) 50 mcg/act nasal spray, 2 sprays into each nostril daily, Disp: , Rfl:     loratadine (CLARITIN) 10 mg tablet, Take 10 mg by mouth daily, Disp: , Rfl:     Magnesium 250 MG TABS, Take 250 mg by mouth 2 (two) times a day, Disp: , Rfl:     metoprolol succinate (TOPROL-XL) 25 mg 24 hr tablet, Take 1 tablet (25 mg total) by mouth 2 (two) times a day, Disp: 180 tablet, Rfl: 2    Multiple Vitamins-Minerals (WOMENS 50+ MULTI VITAMIN/MIN PO), Take by mouth, Disp: , Rfl:     RABEprazole (ACIPHEX) 20 MG tablet, Take 1 tablet (20 mg total) by mouth daily, Disp: 90 tablet, Rfl: 3    saccharomyces boulardii (FLORASTOR) 250 mg capsule, Take 250 mg by mouth 2 (two) times a day, Disp: , Rfl:     zolpidem (AMBIEN) 10 mg tablet, Take 1 tablet (10 mg total) by mouth as needed (as needed), Disp: 30 tablet, Rfl: 0    oxyCODONE-acetaminophen (PERCOCET) 5-325 mg per tablet, Take 1 tablet by mouth every 6 (six) hours as needed for moderate pain Max Daily Amount: 4 tablets (Patient not taking: Reported on 10/9/2018 ), Disp: 6 tablet, Rfl: 0  Allergies   Allergen Reactions    Ace Inhibitors Cough    Adhesive [Medical Tape] Itching     Vitals:    10/09/18 1049   BP: 160/86   Pulse: 84   Resp: 16   Temp: 98 7 °F (37 1 °C)       Physical Exam   Constitutional: She is oriented to person, place, and time  Vital signs are normal  She appears well-developed and well-nourished  No distress  HENT:   Head: Normocephalic and atraumatic  Neck: Trachea normal and normal range of motion  Carotid bruit is not present  Cardiovascular: Normal rate, regular rhythm and normal heart sounds  Pulmonary/Chest: Effort normal and breath sounds normal    Bilateral breasts were examined in the sitting and supine position  Right breast well healed puncture site  There are no masses, skin nodules, nipple changes or nipple discharge  There is no bilateral supraclavicular or axillary lymphadenopathy noted  Abdominal: Soft  Normal appearance  She exhibits no mass  There is no hepatosplenomegaly  There is no tenderness  Musculoskeletal: Normal range of motion  Lymphadenopathy:     She has no axillary adenopathy  Right: No supraclavicular adenopathy present  Left: No supraclavicular adenopathy present  Neurological: She is alert and oriented to person, place, and time  Skin: Skin is warm, dry and intact  No rash noted  She is not diaphoretic  Psychiatric: She has a normal mood and affect  Her speech is normal    Vitals reviewed  Advance Care Planning/Advance Directives:  Discussed disease status and treatment goals with the patient

## 2018-10-22 ENCOUNTER — ANESTHESIA EVENT (OUTPATIENT)
Dept: PERIOP | Facility: HOSPITAL | Age: 63
End: 2018-10-22
Payer: COMMERCIAL

## 2018-10-23 ENCOUNTER — HOSPITAL ENCOUNTER (OUTPATIENT)
Dept: MAMMOGRAPHY | Facility: HOSPITAL | Age: 63
Discharge: HOME | End: 2018-10-23
Attending: SURGERY
Payer: COMMERCIAL

## 2018-10-23 ENCOUNTER — APPOINTMENT (OUTPATIENT)
Dept: MAMMOGRAPHY | Facility: HOSPITAL | Age: 63
End: 2018-10-23
Payer: COMMERCIAL

## 2018-10-23 ENCOUNTER — ANESTHESIA (OUTPATIENT)
Dept: PERIOP | Facility: HOSPITAL | Age: 63
End: 2018-10-23
Payer: COMMERCIAL

## 2018-10-23 ENCOUNTER — HOSPITAL ENCOUNTER (OUTPATIENT)
Facility: HOSPITAL | Age: 63
Setting detail: OUTPATIENT SURGERY
Discharge: HOME | End: 2018-10-23
Attending: SURGERY | Admitting: SURGERY
Payer: COMMERCIAL

## 2018-10-23 ENCOUNTER — CONVERSION ENCOUNTER (OUTPATIENT)
Dept: MAMMOGRAPHY | Facility: HOSPITAL | Age: 63
End: 2018-10-23

## 2018-10-23 VITALS
TEMPERATURE: 97.8 F | HEART RATE: 71 BPM | HEIGHT: 66 IN | WEIGHT: 190 LBS | BODY MASS INDEX: 30.53 KG/M2 | OXYGEN SATURATION: 97 % | DIASTOLIC BLOOD PRESSURE: 72 MMHG | SYSTOLIC BLOOD PRESSURE: 139 MMHG | RESPIRATION RATE: 19 BRPM

## 2018-10-23 DIAGNOSIS — D05.01 BREAST NEOPLASM, TIS (LCIS), RIGHT: ICD-10-CM

## 2018-10-23 DIAGNOSIS — N60.99 BENIGN MAMMARY DYSPLASIA: ICD-10-CM

## 2018-10-23 DIAGNOSIS — D05.01 LOBULAR CARCINOMA IN SITU OF RIGHT BREAST: ICD-10-CM

## 2018-10-23 DIAGNOSIS — N60.99 ATYPICAL LOBULAR HYPERPLASIA OF BREAST: ICD-10-CM

## 2018-10-23 PROCEDURE — 88307 TISSUE EXAM BY PATHOLOGIST: CPT | Performed by: PATHOLOGY

## 2018-10-23 PROCEDURE — 88341 IMHCHEM/IMCYTCHM EA ADD ANTB: CPT | Performed by: PATHOLOGY

## 2018-10-23 PROCEDURE — 19281 PERQ DEVICE BREAST 1ST IMAG: CPT

## 2018-10-23 PROCEDURE — 19301 PARTIAL MASTECTOMY: CPT | Performed by: SURGERY

## 2018-10-23 PROCEDURE — 88342 IMHCHEM/IMCYTCHM 1ST ANTB: CPT | Performed by: PATHOLOGY

## 2018-10-23 RX ORDER — MIDAZOLAM HYDROCHLORIDE 1 MG/ML
INJECTION INTRAMUSCULAR; INTRAVENOUS AS NEEDED
Status: DISCONTINUED | OUTPATIENT
Start: 2018-10-23 | End: 2018-10-23 | Stop reason: SURG

## 2018-10-23 RX ORDER — SODIUM CHLORIDE 9 MG/ML
INJECTION, SOLUTION INTRAVENOUS CONTINUOUS PRN
Status: DISCONTINUED | OUTPATIENT
Start: 2018-10-23 | End: 2018-10-23

## 2018-10-23 RX ORDER — LABETALOL HYDROCHLORIDE 5 MG/ML
10 INJECTION, SOLUTION INTRAVENOUS
Status: DISCONTINUED | OUTPATIENT
Start: 2018-10-23 | End: 2018-10-23 | Stop reason: HOSPADM

## 2018-10-23 RX ORDER — SODIUM CHLORIDE 9 MG/ML
100 INJECTION, SOLUTION INTRAVENOUS CONTINUOUS
Status: DISCONTINUED | OUTPATIENT
Start: 2018-10-23 | End: 2018-10-23 | Stop reason: HOSPADM

## 2018-10-23 RX ORDER — DIPHENHYDRAMINE HYDROCHLORIDE 50 MG/ML
12.5 INJECTION INTRAMUSCULAR; INTRAVENOUS ONCE AS NEEDED
Status: DISCONTINUED | OUTPATIENT
Start: 2018-10-23 | End: 2018-10-23 | Stop reason: HOSPADM

## 2018-10-23 RX ORDER — HYDROMORPHONE HCL/PF 1 MG/ML
0.2 SYRINGE (ML) INJECTION
Status: DISCONTINUED | OUTPATIENT
Start: 2018-10-23 | End: 2018-10-23 | Stop reason: HOSPADM

## 2018-10-23 RX ORDER — OXYCODONE HYDROCHLORIDE AND ACETAMINOPHEN 5; 325 MG/1; MG/1
1 TABLET ORAL EVERY 4 HOURS PRN
Status: DISCONTINUED | OUTPATIENT
Start: 2018-10-23 | End: 2018-10-23 | Stop reason: HOSPADM

## 2018-10-23 RX ORDER — LIDOCAINE WITH 8.4% SOD BICARB 0.9%(10ML)
5 SYRINGE (ML) INJECTION ONCE
Status: COMPLETED | OUTPATIENT
Start: 2018-10-23 | End: 2018-10-23

## 2018-10-23 RX ORDER — MAGNESIUM HYDROXIDE 1200 MG/15ML
LIQUID ORAL AS NEEDED
Status: DISCONTINUED | OUTPATIENT
Start: 2018-10-23 | End: 2018-10-23 | Stop reason: HOSPADM

## 2018-10-23 RX ORDER — ONDANSETRON 2 MG/ML
INJECTION INTRAMUSCULAR; INTRAVENOUS AS NEEDED
Status: DISCONTINUED | OUTPATIENT
Start: 2018-10-23 | End: 2018-10-23 | Stop reason: SURG

## 2018-10-23 RX ORDER — EPHEDRINE SULFATE 50 MG/ML
INJECTION, SOLUTION INTRAVENOUS AS NEEDED
Status: DISCONTINUED | OUTPATIENT
Start: 2018-10-23 | End: 2018-10-23 | Stop reason: SURG

## 2018-10-23 RX ORDER — KETOROLAC TROMETHAMINE 30 MG/ML
INJECTION, SOLUTION INTRAMUSCULAR; INTRAVENOUS AS NEEDED
Status: DISCONTINUED | OUTPATIENT
Start: 2018-10-23 | End: 2018-10-23 | Stop reason: SURG

## 2018-10-23 RX ORDER — METOCLOPRAMIDE HYDROCHLORIDE 5 MG/ML
10 INJECTION INTRAMUSCULAR; INTRAVENOUS ONCE AS NEEDED
Status: DISCONTINUED | OUTPATIENT
Start: 2018-10-23 | End: 2018-10-23 | Stop reason: HOSPADM

## 2018-10-23 RX ORDER — FENTANYL CITRATE/PF 50 MCG/ML
50 SYRINGE (ML) INJECTION
Status: COMPLETED | OUTPATIENT
Start: 2018-10-23 | End: 2018-10-23

## 2018-10-23 RX ORDER — ONDANSETRON 2 MG/ML
4 INJECTION INTRAMUSCULAR; INTRAVENOUS ONCE AS NEEDED
Status: DISCONTINUED | OUTPATIENT
Start: 2018-10-23 | End: 2018-10-23 | Stop reason: HOSPADM

## 2018-10-23 RX ORDER — PROPOFOL 10 MG/ML
INJECTION, EMULSION INTRAVENOUS AS NEEDED
Status: DISCONTINUED | OUTPATIENT
Start: 2018-10-23 | End: 2018-10-23 | Stop reason: SURG

## 2018-10-23 RX ORDER — FENTANYL CITRATE 50 UG/ML
INJECTION, SOLUTION INTRAMUSCULAR; INTRAVENOUS AS NEEDED
Status: DISCONTINUED | OUTPATIENT
Start: 2018-10-23 | End: 2018-10-23 | Stop reason: SURG

## 2018-10-23 RX ORDER — BUPIVACAINE HYDROCHLORIDE AND EPINEPHRINE 2.5; 5 MG/ML; UG/ML
INJECTION, SOLUTION INFILTRATION; PERINEURAL AS NEEDED
Status: DISCONTINUED | OUTPATIENT
Start: 2018-10-23 | End: 2018-10-23 | Stop reason: HOSPADM

## 2018-10-23 RX ADMIN — LIDOCAINE HYDROCHLORIDE 4 ML: 10 INJECTION, SOLUTION INFILTRATION; PERINEURAL at 08:55

## 2018-10-23 RX ADMIN — FENTANYL CITRATE 50 MCG: 50 INJECTION, SOLUTION INTRAMUSCULAR; INTRAVENOUS at 10:46

## 2018-10-23 RX ADMIN — FENTANYL CITRATE 50 MCG: 50 INJECTION, SOLUTION INTRAMUSCULAR; INTRAVENOUS at 11:00

## 2018-10-23 RX ADMIN — ONDANSETRON 4 MG: 2 INJECTION INTRAMUSCULAR; INTRAVENOUS at 09:40

## 2018-10-23 RX ADMIN — MIDAZOLAM HYDROCHLORIDE 2 MG: 1 INJECTION, SOLUTION INTRAMUSCULAR; INTRAVENOUS at 09:37

## 2018-10-23 RX ADMIN — EPHEDRINE SULFATE 5 MG: 50 INJECTION, SOLUTION INTRAMUSCULAR; INTRAVENOUS; SUBCUTANEOUS at 10:21

## 2018-10-23 RX ADMIN — OXYCODONE HYDROCHLORIDE AND ACETAMINOPHEN 1 TABLET: 5; 325 TABLET ORAL at 11:17

## 2018-10-23 RX ADMIN — FENTANYL CITRATE 25 MCG: 50 INJECTION INTRAMUSCULAR; INTRAVENOUS at 10:02

## 2018-10-23 RX ADMIN — LIDOCAINE HYDROCHLORIDE 100 MG: 20 INJECTION, SOLUTION INTRAVENOUS at 09:39

## 2018-10-23 RX ADMIN — DEXAMETHASONE SODIUM PHOSPHATE 5 MG: 10 INJECTION INTRAMUSCULAR; INTRAVENOUS at 09:40

## 2018-10-23 RX ADMIN — CEFAZOLIN SODIUM 2000 MG: 1 SOLUTION INTRAVENOUS at 09:42

## 2018-10-23 RX ADMIN — KETOROLAC TROMETHAMINE 30 MG: 30 INJECTION, SOLUTION INTRAMUSCULAR at 10:15

## 2018-10-23 RX ADMIN — FENTANYL CITRATE 25 MCG: 50 INJECTION INTRAMUSCULAR; INTRAVENOUS at 10:11

## 2018-10-23 RX ADMIN — SODIUM CHLORIDE: 0.9 INJECTION, SOLUTION INTRAVENOUS at 07:55

## 2018-10-23 RX ADMIN — EPHEDRINE SULFATE 10 MG: 50 INJECTION, SOLUTION INTRAMUSCULAR; INTRAVENOUS; SUBCUTANEOUS at 10:23

## 2018-10-23 RX ADMIN — FENTANYL CITRATE 25 MCG: 50 INJECTION INTRAMUSCULAR; INTRAVENOUS at 09:46

## 2018-10-23 RX ADMIN — PROPOFOL 200 MG: 10 INJECTION, EMULSION INTRAVENOUS at 09:39

## 2018-10-23 NOTE — OP NOTE
OPERATIVE REPORT  PATIENT NAME: Andre Cowden    :  1955  MRN: [id number]  Pt Location: AN OR ROOM 02    SURGERY DATE: 10/23/2018    Surgeon(s) and Role:     * Chio Wetzel MD - Primary     * Marianne Jenkins MD - Assisting    Preop Diagnosis:  Atypical lobular hyperplasia of breast [N60 99]  Breast neoplasm, Tis (LCIS), right [D05 01]    Post-Op Diagnosis Codes:     * Atypical lobular hyperplasia of breast [N60 99]     * Breast neoplasm, Tis (LCIS), right [D05 01]    Procedure(s) (LRB):  BREAST LUMPECTOMY; BREAST NEEDLE LOCATION (NEEDLE LOC AT 0830)  (Right)    Specimen(s):  ID Type Source Tests Collected by Time Destination   1 : right breast lumpectomy, black is inferior, blue is posterior Tissue Breast, NOS TISSUE EXAM Chio Wetzel MD 10/23/2018 1013        Estimated Blood Loss:   Minimal    Drains:       Anesthesia Type:   General    Operative Indications:  Atypical lobular hyperplasia of breast [N60 99]  Breast neoplasm, Tis (LCIS), right [D05 01]      Operative Findings:  Good specimen radiograph    Complications:   None    Procedure and Technique:  I previously reviewed the needle localization images  Lumpectomy  The patient was brought to the operation room and placed under general anesthesia  Attention was paid to ensure appropriate padding and correct positioning  The right breast was prepped and draped in a sterile fashion  I initiated a time-out, identifying the patient, the correct side and the above procedure  All parties agreed with the time out  The planned incision was then injected with  Marcaine and epinephrine for local anesthesia  The incision was sharply incised  The localizing wire was used to guide the dissection  Anterior posterior superior and inferior planes were developed around the localizing wire and the specimen truncated medially with electrocautery just beyond the tip of the wire  The specimen was then inked for orientation purposes    A specimen radiograph was taken in two dimensions  The specimen radiograph in 2 dimensions demonstrated both clips within the specimen  The specimen was sent to pathology for permanent analysis  Superficial bleeding controlled with electrocautery and the wound was extensively irrigated  The wound was closed with two layers, an inner layer of 3-0 vicryl and a subcuticular layer of 4-0 monocryl  Histocryl and steri-strips were applied     I was present for the entire procedure    Patient Disposition:  PACU     SIGNATURE: Kacey Pappas MD  DATE: October 23, 2018  TIME: 10:18 AM

## 2018-10-23 NOTE — ANESTHESIA POSTPROCEDURE EVALUATION
Post-Op Assessment Note      CV Status:  Stable    Mental Status:  Awake and alert    Hydration Status:  Stable    PONV Controlled:  None    Airway Patency:  Patent    Post Op Vitals Reviewed: Yes          Staff: CRNA           BP (P) 156/72 (10/23/18 1034)    Temp (!) (P) 97 2 °F (36 2 °C) (10/23/18 1034)    Pulse (P) 83 (10/23/18 1034)   Resp (P) 16 (10/23/18 1034)    SpO2 (P) 100 % (10/23/18 1034)    Postop VS in PACU noted above, SV non-obstructed on [facility]

## 2018-10-23 NOTE — H&P (VIEW-ONLY)
Surgical Oncology Follow Up       [address] [facility]  [facility] DEONDRE Mitchellnathan[address] [facility]  1955  [id number]      Chief Complaint   Patient presents with    Pre-op Exam     Atypical lobular hyperplasia of breast        Assessment/Plan:  1  Atypical lobular hyperplasia [facility]) of breast  - Surgery with Dr Jadon López 10/23     2  Breast neoplasm, Tis (LCIS), right  - Surgery with Dr Jadon López 10/23        Discussion/Summary:   Patient is a 62-year-old female who presented to our office after a diagnosis of right breast LCIS and [place]  She underwent a bilateral diagnostic mammogram in August 2018 which revealed new indeterminate microcalcification cluster in the posterior right upper outer quadrant  She underwent a stereotactic biopsy on August 29th  Pathology revealed an intraductal papilloma (3 mm) with usual ductal hyperplasia, focal ALH, 3 millimeters and LCIS  She met with Dr Jadon López and has consented for a needle loc lumpectomy  This is scheduled for October 23, 2018  She presents today for a preoperative history and physical  She has no new complaints today aside from a common cold  Reports a mild cough but no SOB  EKG and CXR performed this morning, results pending  No worrisome physical exam findings  I have instructed the patient to call with any new symptoms or concerns prior to her surgery  We will obtain her recent blood work from an outside lab  All of her and her 's questions were answered  History of Present Illness:     -Interval History:   Patient presents today for a preoperative history and physical   She has no new complaints today aside from a common cold which is resolving  She denies headaches, back pain, bone pain, shortness of breath, abdominal pain  She is scheduled for a lumpectomy with Dr Jadon López on October 23rd  She completed her chest x-ray and EKG this morning, results pending    She completed her labs in September and our office is coordinating obtaining the results  Review of Systems:  Review of Systems   Constitutional: Negative for activity change, appetite change, chills, fatigue, fever and unexpected weight change  HENT: Negative for trouble swallowing  Eyes: Negative for pain, redness and visual disturbance  Respiratory: Negative for cough, shortness of breath and wheezing  Cardiovascular: Negative for chest pain, palpitations and leg swelling  Gastrointestinal: Negative for abdominal pain, constipation, diarrhea, nausea and vomiting  Endocrine: Negative for cold intolerance and heat intolerance  Musculoskeletal: Negative for arthralgias, back pain, gait problem and myalgias  Skin: Negative for color change and rash  Neurological: Negative for dizziness, syncope, light-headedness, numbness and headaches  Hematological: Negative for adenopathy  Psychiatric/Behavioral: Negative for agitation and confusion  All other systems reviewed and are negative        Patient Active Problem List   Diagnosis    Hypertension    Gastroesophageal reflux disease    Insomnia    History of colon polyps    Atypical lobular hyperplasia (ALH) of breast    Atypical lobular hyperplasia of breast    Breast neoplasm, Tis (LCIS), right     Past Medical History:   Diagnosis Date    Arthritis     Cancer ([facility] [address] )     skin    Chest pain     Colon polyps     GERD (gastroesophageal reflux disease)     Hypertension     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      Past Surgical History:   Procedure Laterality Date    CARPAL TUNNEL RELEASE Left     CTR    COLONOSCOPY  03/2013    repeat in 5 yrs,  Dr Latrice Gilman      Nissen fundoplication    ESOPHAGOGASTRODUODENOSCOPY      FOOT SURGERY Left     plantar fascia     FUNDOPLICATION TRANSORAL      HERNIA REPAIR  2010    MAMMO STEREOTACTIC BREAST BIOPSY RIGHT (ALL INC) Right 8/29/2018    POLYPECTOMY  2008    ulcerated polypectomy     TONSILLECTOMY      TUBAL LIGATION       Family History   Problem Relation Age of Onset    Diabetes Mother     Heart disease Mother     Osteoporosis Mother     Heart disease Father     Skin cancer Father     Cancer Family     Breast cancer Paternal Aunt      Social History     Social History    Marital status: /Civil Union     Spouse name: N/A    Number of children: N/A    Years of education: N/A     Occupational History    Not on file       Social History Main Topics    Smoking status: Never Smoker    Smokeless tobacco: Never Used    Alcohol use Yes      Comment: socially    Drug use: No    Sexual activity: Not on file     Other Topics Concern    Not on file     Social History Narrative    No narrative on file       Current Outpatient Prescriptions:     Calcium-Magnesium-Vitamin D (CITRACAL CALCIUM+D PO), Take by mouth, Disp: , Rfl:     Cholecalciferol (VITAMIN D) 2000 units CAPS, Take 1 tablet by mouth daily, Disp: , Rfl:     docusate sodium (CVS STOOL SOFTENER) 100 mg capsule, Take 1 capsule by mouth daily, Disp: , Rfl:     Flaxseed, Linseed, (FLAXSEED OIL PO), Take 1,400 mg by mouth daily 1400 mg daily , Disp: , Rfl:     fluticasone (FLONASE) 50 mcg/act nasal spray, 2 sprays into each nostril daily, Disp: , Rfl:     loratadine (CLARITIN) 10 mg tablet, Take 10 mg by mouth daily, Disp: , Rfl:     Magnesium 250 MG TABS, Take 250 mg by mouth 2 (two) times a day, Disp: , Rfl:     metoprolol succinate (TOPROL-XL) 25 mg 24 hr tablet, Take 1 tablet (25 mg total) by mouth 2 (two) times a day, Disp: 180 tablet, Rfl: 2    Multiple Vitamins-Minerals (WOMENS 50+ MULTI VITAMIN/MIN PO), Take by mouth, Disp: , Rfl:     RABEprazole (ACIPHEX) 20 MG tablet, Take 1 tablet (20 mg total) by mouth daily, Disp: 90 tablet, Rfl: 3    saccharomyces boulardii (FLORASTOR) 250 mg capsule, Take 250 mg by mouth 2 (two) times a day, Disp: , Rfl:     zolpidem (AMBIEN) 10 mg tablet, Take 1 tablet (10 mg total) by mouth as needed (as needed), Disp: 30 tablet, Rfl: 0    oxyCODONE-acetaminophen (PERCOCET) 5-325 mg per tablet, Take 1 tablet by mouth every 6 (six) hours as needed for moderate pain Max Daily Amount: 4 tablets (Patient not taking: Reported on 10/9/2018 ), Disp: 6 tablet, Rfl: 0  Allergies   Allergen Reactions    Ace Inhibitors Cough    Adhesive [Medical Tape] Itching     Vitals:    10/09/18 1049   BP: 160/86   Pulse: 84   Resp: 16   Temp: 98 7 °F (37 1 °C)       Physical Exam   Constitutional: She is oriented to person, place, and time  Vital signs are normal  She appears well-developed and well-nourished  No distress  HENT:   Head: Normocephalic and atraumatic  Neck: Trachea normal and normal range of motion  Carotid bruit is not present  Cardiovascular: Normal rate, regular rhythm and normal heart sounds  Pulmonary/Chest: Effort normal and breath sounds normal    Bilateral breasts were examined in the sitting and supine position  Right breast well healed puncture site  There are no masses, skin nodules, nipple changes or nipple discharge  There is no bilateral supraclavicular or axillary lymphadenopathy noted  Abdominal: Soft  Normal appearance  She exhibits no mass  There is no hepatosplenomegaly  There is no tenderness  Musculoskeletal: Normal range of motion  Lymphadenopathy:     She has no axillary adenopathy  Right: No supraclavicular adenopathy present  Left: No supraclavicular adenopathy present  Neurological: She is alert and oriented to person, place, and time  Skin: Skin is warm, dry and intact  No rash noted  She is not diaphoretic  Psychiatric: She has a normal mood and affect  Her speech is normal    Vitals reviewed  Advance Care Planning/Advance Directives:  Discussed disease status and treatment goals with the patient

## 2018-10-23 NOTE — ANESTHESIA PREPROCEDURE EVALUATION
Review of Systems/Medical History  Patient summary reviewed  Chart reviewed  No history of anesthetic complications     Cardiovascular  Hypertension ,    Pulmonary       GI/Hepatic    GERD ,             Endo/Other     GYN       Hematology   Musculoskeletal    Arthritis     Neurology   Psychology       took metoprolol this AM         Anesthesia Plan  ASA Score- 2     Anesthesia Type- general with ASA Monitors  Additional Monitors:   Airway Plan: LMA  Plan Factors-    Induction- intravenous  Postoperative Plan- Plan for postoperative opioid use  Informed Consent- Anesthetic plan and risks discussed with mother

## 2018-10-23 NOTE — DISCHARGE INSTRUCTIONS
POST-OPERATIVE BREAST CARE INSTRUCTIONS    Wound Closure/Dressing: Your wound is closed with:   Steri strips-white pieces of tape that hold your incision together along with surgical glue           Dissolvable sutures-underneath the skin    Wound care:     If you have gauze over your wound you may remove it the day after surgery  Leave the Steri-strips on, they will fall off on their own in 1-2 weeks   You may shower using soap and water to clean your wound  Gently pat dry  Drain Care: If you do not have a drain, disregard this section   Visiting Nursing should have been arranged upon discharge from hospital   A nurse will call your home to schedule an initial visit  Please call the number below if you have not received a call within 48 hours of hospital discharge   You may shower with the DELIA drains  Wash area around the drains with soap and water and pat dry   Record drain outputs on chart given to you at pre op visit and bring that chart to office at post op appt   DELIA drains can be removed in the office by a nurse either at post op visit OR when drain output is 30 ccs or less in a 24 hour period for three days in a row   If you had plastic surgery or reconstructive surgery, the plastic surgeon will manage the drains  Other:       You can begin wearing your own bra when it feels comfortable   You may resume using deodorant the day after surgery                 Post-op visit:  your post-op visit is scheduled for:  2018 @ 10:30 AM    Call our office at [phone] if you have any  of the followin  Redness, swelling, heat, unusual drainage or heavy bleeding from wound  2  Fever greater than 101 °  3  Pain not relieved by medication

## 2018-11-05 ENCOUNTER — OFFICE VISIT (OUTPATIENT)
Dept: SURGICAL ONCOLOGY | Facility: CLINIC | Age: 63
End: 2018-11-05

## 2018-11-05 VITALS
DIASTOLIC BLOOD PRESSURE: 88 MMHG | HEART RATE: 73 BPM | HEIGHT: 66 IN | WEIGHT: 198 LBS | BODY MASS INDEX: 31.82 KG/M2 | TEMPERATURE: 97.9 F | SYSTOLIC BLOOD PRESSURE: 142 MMHG | RESPIRATION RATE: 16 BRPM

## 2018-11-05 DIAGNOSIS — N60.99 ATYPICAL LOBULAR HYPERPLASIA (ALH) OF BREAST: Primary | ICD-10-CM

## 2018-11-05 PROCEDURE — 99024 POSTOP FOLLOW-UP VISIT: CPT | Performed by: SURGERY

## 2018-11-05 NOTE — PROGRESS NOTES
Surgical Oncology Breast Post-Op       [address] [facility],[address]  [facility] [address]  [place] [id number] [facility]  1955  [id number]  [address] [facility]  [facility] [address] [id number]    Chief Complaint:   Gail Simon is seen for a post-operative visit of her recent breast surgery  Oncology History:      No history exists  Assessment & Plan:   Assessment/Plan    Patient is right breast biopsy demonstrated foci of ADH as well as LCIS  This elevates her risk is substantially  I have recommended she see Dr Sonido Gomez for an opinion regarding chemoprevention will coordinate that for her  I have also recommended MRIs on an annual basis well as clinical breast exams staggered 6 months from her gynecology exam   We had a long conversation regarding different risk calculators, though I explained I believe the consensus of these calculator is a is still sufficiently elevated for MRIs or other adjuvant studies such as 3D ultrasounds  We will see her back in 6 months to coordinate her imaging  Wound is healing well    History of Present Illness:   See above    Interval History:   See above    Review of Systems:   Review of Systems    Past Medical History:     Patient Active Problem List   Diagnosis    Hypertension    Gastroesophageal reflux disease    Insomnia    History of colon polyps    Atypical lobular hyperplasia (ALH) of breast     Past Medical History:   Diagnosis Date    Arthritis     Cancer ([address] )     skin    Chest pain     Colon polyps     GERD (gastroesophageal reflux disease)     Hypertension     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      Past Surgical History:   Procedure Laterality Date    CARPAL TUNNEL RELEASE Left     CTR    COLONOSCOPY  03/2013    repeat in 5 yrs,  Dr Janiece Bolder Nissen fundoplication    ESOPHAGOGASTRODUODENOSCOPY      FOOT SURGERY Left     plantar fascia     FUNDOPLICATION TRANSORAL      HERNIA REPAIR  2010    MAMMO NEEDLE LOCALIZATION RIGHT (ALL INC) Right 10/23/2018    MAMMO STEREOTACTIC BREAST BIOPSY RIGHT (ALL INC) Right 8/29/2018    POLYPECTOMY  2008    ulcerated polypectomy     RI PERQ DEVICE PLACEMT BREAST LOC 1ST LES W DAQUAN Right 10/23/2018    Procedure: BREAST LUMPECTOMY; BREAST NEEDLE LOCATION (NEEDLE LOC AT 0830) ; Surgeon: Mariposa Richmond MD;  [facility];  Service: Surgical Oncology    TONSILLECTOMY      TUBAL LIGATION       Family History   Problem Relation Age of Onset    Diabetes Mother     Heart disease Mother     Osteoporosis Mother     Heart disease Father     Skin cancer Father     Cancer Family     Breast cancer Paternal Aunt      Social History     Social History    Marital status: /Civil Union     Spouse name: N/A    Number of children: N/A    Years of education: N/A     Occupational History    Not on file       Social History Main Topics    Smoking status: Never Smoker    Smokeless tobacco: Never Used    Alcohol use Yes      Comment: socially    Drug use: No    Sexual activity: Not on file     Other Topics Concern    Not on file     Social History Narrative    No narrative on file       Current Outpatient Prescriptions:     Calcium-Magnesium-Vitamin D (CITRACAL CALCIUM+D PO), Take by mouth, Disp: , Rfl:     Cholecalciferol (VITAMIN D) 2000 units CAPS, Take 1 tablet by mouth daily, Disp: , Rfl:     docusate sodium (CVS STOOL SOFTENER) 100 mg capsule, Take 1 capsule by mouth daily, Disp: , Rfl:     Flaxseed, Linseed, (FLAXSEED OIL PO), Take 1,400 mg by mouth daily 1400 mg daily , Disp: , Rfl:     fluticasone (FLONASE) 50 mcg/act nasal spray, 2 sprays into each nostril daily, Disp: , Rfl:     loratadine (CLARITIN) 10 mg tablet, Take 10 mg by mouth daily, Disp: , Rfl:     Magnesium 250 MG TABS, Take 250 mg by mouth 2 (two) times a day, Disp: , Rfl:     metoprolol succinate (TOPROL-XL) 25 mg 24 hr tablet, Take 1 tablet (25 mg total) by mouth 2 (two) times a day, Disp: 180 tablet, Rfl: 2    Multiple Vitamins-Minerals (WOMENS 50+ MULTI VITAMIN/MIN PO), Take by mouth, Disp: , Rfl:     oxyCODONE-acetaminophen (PERCOCET) 5-325 mg per tablet, Take 1 tablet by mouth every 6 (six) hours as needed for moderate pain Max Daily Amount: 4 tablets (Patient not taking: Reported on 10/9/2018 ), Disp: 6 tablet, Rfl: 0    RABEprazole (ACIPHEX) 20 MG tablet, Take 1 tablet (20 mg total) by mouth daily, Disp: 90 tablet, Rfl: 3    saccharomyces boulardii (FLORASTOR) 250 mg capsule, Take 250 mg by mouth 2 (two) times a day, Disp: , Rfl:     zolpidem (AMBIEN) 10 mg tablet, Take 1 tablet (10 mg total) by mouth as needed (as needed), Disp: 30 tablet, Rfl: 0  Allergies   Allergen Reactions    Ace Inhibitors Cough    Adhesive [Medical Tape] Itching       Physical Exams:     Vitals:    11/05/18 1104   BP: 142/88   Pulse: 73   Resp: 16   Temp: 97 9 °F (36 6 °C)     Physical Exam   Pulmonary/Chest:   Examination of the right upper outer quadrant breast incision shows no evidence of infection  She has a significant healing ridge  Results:       Labs:       Discussion/Summary:   See above, all questions were answered to the patient's satisfaction  I provided her a copy of her pathology report

## 2018-11-05 NOTE — LETTER
November 5, 2018     Nicky Thorpe MD  [address] [facility] [address]    Patient: Terrance Fink   YOB: 1955   Date of Visit: 11/5/2018       Dear Dr Spicer : Thank you for referring [place] Dorsey to me for evaluation  Below are my notes for this consultation  If you have questions, please do not hesitate to call me  I look forward to following your patient along with you  Sincerely,        Gabby Hurd MD        CC: No Recipients  Gabby Hurd MD  11/5/2018 11:54 AM  Sign at close encounter     Surgical Oncology Breast Post-Op       [address]  1955  [id number]  [address] [facility],[address]  [facility] [address]    Chief Complaint:   Deysi Dickens is seen for a post-operative visit of her recent breast surgery  Oncology History:      No history exists  Assessment & Plan:   Assessment/Plan    Patient is right breast biopsy demonstrated foci of ADH as well as LCIS  This elevates her risk is substantially  I have recommended she see Dr Tamie Meza for an opinion regarding chemoprevention will coordinate that for her  I have also recommended MRIs on an annual basis well as clinical breast exams staggered 6 months from her gynecology exam   We had a long conversation regarding different risk calculators, though I explained I believe the consensus of these calculator is a is still sufficiently elevated for MRIs or other adjuvant studies such as 3D ultrasounds  We will see her back in 6 months to coordinate her imaging  Wound is healing well    History of Present Illness:   See above    Interval History:   See above    Review of Systems:   Review of Systems    Past Medical History:     Patient Active Problem List   Diagnosis    Hypertension    Gastroesophageal reflux disease    Insomnia    History of colon polyps    Atypical lobular hyperplasia (ALH) of breast     Past Medical History:   Diagnosis Date    Arthritis     Cancer ([address] )     skin    Chest pain     Colon polyps     GERD (gastroesophageal reflux disease)     Hypertension     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      Past Surgical History:   Procedure Laterality Date    CARPAL TUNNEL RELEASE Left     CTR    COLONOSCOPY  03/2013    repeat in 5 yrs,  Dr Bryan Mcgarry      Nissen fundoplication    ESOPHAGOGASTRODUODENOSCOPY      FOOT SURGERY Left     plantar fascia     FUNDOPLICATION TRANSORAL      HERNIA REPAIR  2010    MAMMO NEEDLE LOCALIZATION RIGHT (ALL INC) Right 10/23/2018    MAMMO STEREOTACTIC BREAST BIOPSY RIGHT (ALL INC) Right 8/29/2018    POLYPECTOMY  2008    ulcerated polypectomy     [place] PERQ DEVICE PLACEMT BREAST LOC 1ST LES W GUIDNCE Right 10/23/2018    Procedure: BREAST LUMPECTOMY; BREAST NEEDLE LOCATION (NEEDLE LOC AT 0830) ; Surgeon: Jennifer Mcfarlane MD;  [facility];  Service: Surgical Oncology    TONSILLECTOMY      TUBAL LIGATION       Family History   Problem Relation Age of Onset    Diabetes Mother     Heart disease Mother     Osteoporosis Mother     Heart disease Father     Skin cancer Father     Cancer Family     Breast cancer Paternal Aunt      Social History     Social History    Marital status: /Civil Union     Spouse name: N/A    Number of children: N/A    Years of education: N/A     Occupational History    Not on file       Social History Main Topics    Smoking status: Never Smoker    Smokeless tobacco: Never Used    Alcohol use Yes      Comment: socially    Drug use: No    Sexual activity: Not on file     Other Topics Concern    Not on file     Social History Narrative    No narrative on file       Current Outpatient Prescriptions:     Calcium-Magnesium-Vitamin D (CITRACAL CALCIUM+D PO), Take by mouth, Disp: , Rfl:   Cholecalciferol (VITAMIN D) 2000 units CAPS, Take 1 tablet by mouth daily, Disp: , Rfl:     docusate sodium (CVS STOOL SOFTENER) 100 mg capsule, Take 1 capsule by mouth daily, Disp: , Rfl:     Flaxseed, Linseed, (FLAXSEED OIL PO), Take 1,400 mg by mouth daily 1400 mg daily , Disp: , Rfl:     fluticasone (FLONASE) 50 mcg/act nasal spray, 2 sprays into each nostril daily, Disp: , Rfl:     loratadine (CLARITIN) 10 mg tablet, Take 10 mg by mouth daily, Disp: , Rfl:     Magnesium 250 MG TABS, Take 250 mg by mouth 2 (two) times a day, Disp: , Rfl:     metoprolol succinate (TOPROL-XL) 25 mg 24 hr tablet, Take 1 tablet (25 mg total) by mouth 2 (two) times a day, Disp: 180 tablet, Rfl: 2    Multiple Vitamins-Minerals (WOMENS 50+ MULTI VITAMIN/MIN PO), Take by mouth, Disp: , Rfl:     oxyCODONE-acetaminophen (PERCOCET) 5-325 mg per tablet, Take 1 tablet by mouth every 6 (six) hours as needed for moderate pain Max Daily Amount: 4 tablets (Patient not taking: Reported on 10/9/2018 ), Disp: 6 tablet, Rfl: 0    RABEprazole (ACIPHEX) 20 MG tablet, Take 1 tablet (20 mg total) by mouth daily, Disp: 90 tablet, Rfl: 3    saccharomyces boulardii (FLORASTOR) 250 mg capsule, Take 250 mg by mouth 2 (two) times a day, Disp: , Rfl:     zolpidem (AMBIEN) 10 mg tablet, Take 1 tablet (10 mg total) by mouth as needed (as needed), Disp: 30 tablet, Rfl: 0  Allergies   Allergen Reactions    Ace Inhibitors Cough    Adhesive [Medical Tape] Itching       Physical Exams:     Vitals:    11/05/18 1104   BP: 142/88   Pulse: 73   Resp: 16   Temp: 97 9 °F (36 6 °C)     Physical Exam   Pulmonary/Chest:   Examination of the right upper outer quadrant breast incision shows no evidence of infection  She has a significant healing ridge  Results:       Labs:       Discussion/Summary:   See above, all questions were answered to the patient's satisfaction  I provided her a copy of her pathology report

## 2018-11-15 ENCOUNTER — LAB REQUISITION (OUTPATIENT)
Dept: LAB | Facility: HOSPITAL | Age: 63
End: 2018-11-15
Payer: COMMERCIAL

## 2018-11-15 DIAGNOSIS — Z01.419 ENCOUNTER FOR GYNECOLOGICAL EXAMINATION WITHOUT ABNORMAL FINDING: ICD-10-CM

## 2018-11-15 DIAGNOSIS — Z11.51 ENCOUNTER FOR SCREENING FOR HUMAN PAPILLOMAVIRUS (HPV): ICD-10-CM

## 2018-11-15 PROCEDURE — 87624 HPV HI-RISK TYP POOLED RSLT: CPT | Performed by: OBSTETRICS & GYNECOLOGY

## 2018-11-15 PROCEDURE — G0145 SCR C/V CYTO,THINLAYER,RESCR: HCPCS | Performed by: OBSTETRICS & GYNECOLOGY

## 2018-11-19 LAB
HPV HR 12 DNA CVX QL NAA+PROBE: NEGATIVE
HPV16 DNA CVX QL NAA+PROBE: NEGATIVE
HPV18 DNA CVX QL NAA+PROBE: NEGATIVE

## 2018-11-21 LAB
LAB AP GYN PRIMARY INTERPRETATION: NORMAL
Lab: NORMAL

## 2018-11-23 ENCOUNTER — OFFICE VISIT (OUTPATIENT)
Dept: HEMATOLOGY ONCOLOGY | Facility: CLINIC | Age: 63
End: 2018-11-23
Payer: COMMERCIAL

## 2018-11-23 VITALS
TEMPERATURE: 97 F | WEIGHT: 196 LBS | RESPIRATION RATE: 16 BRPM | SYSTOLIC BLOOD PRESSURE: 134 MMHG | OXYGEN SATURATION: 94 % | BODY MASS INDEX: 31.5 KG/M2 | HEART RATE: 69 BPM | HEIGHT: 66 IN | DIASTOLIC BLOOD PRESSURE: 90 MMHG

## 2018-11-23 DIAGNOSIS — N60.99 ATYPICAL LOBULAR HYPERPLASIA (ALH) OF BREAST: Primary | ICD-10-CM

## 2018-11-23 PROCEDURE — 99244 OFF/OP CNSLTJ NEW/EST MOD 40: CPT | Performed by: INTERNAL MEDICINE

## 2018-11-23 NOTE — LETTER
November 23, 2018     Lukasz Ron MD  [address]    Patient: Lucian Mendes   YOB: 1955   Date of Visit: 11/23/2018       Dear Dr Ryan Judd: Thank you for referring Khoa Hook to me for evaluation  Below are my notes for this consultation  If you have questions, please do not hesitate to call me  I look forward to following your patient along with you  Sincerely,        Amanda Michael MD        CC: MD Amanda Perez MD  11/23/2018  9:10 AM  Sign at close encounter  Hematology / Oncology Outpatient Consult Note    Lucian Mendes 63 y o  female [id number]         Date:  11/23/2018    Assessment / Plan:  A 63-year-old postmenopausal woman with newly diagnosed atypical lobular hyperplasia/ lobular carcinoma in situ in the right breast   She underwent lumpectomy  She was on hormone replacement therapy until the diagnosis of LCIS  She has pre-existing arthritis as well as significant hot flashes  We discussed possible chemoprevention  Since she is postmenopausal, aromatase inhibitor can be considered  Her lifetime risk of breast cancer is probably approximately 30% without chemoprevention  Chemoprevention probably reduced lifetime risk of breast cancer to 15% range  However, there is no survival advantage with chemoprevention  Side effects of anastrozole was thoroughly discussed, including but not limited to hot flashes, musculoskeletal symptoms and bone mineral density loss  After the lengthy discussion, she decided against the use of aromatase inhibitor which I believe very reasonable since there is no survival advantage  She is going to be screened for breast cancer by Dr Radha Pruitt  She and her  are in agreement with my recommendations  Subjective:     HPI:   A 63-year-old postmenopausal woman who was on hormone replacement therapy for 4 years until September 2018    She was found to have radiographic abnormality in her right breast   Right breast biopsy in late August 2018 showed atypical lobular hyperplasia as well as lobular carcinoma in situ  Therefore, she underwent lumpectomy by Dr Trujillo Records in October 23, 2018  Lumpectomy specimen showed no evidence of invasive carcinoma  It showed atypical lobular hyperplasia as well as lobular carcinoma in situ  She presents today to discuss possible chemoprevention  She has no complaint of pain  She is in usual status health  She has hypertension, arthritis as well as GERD  She has no history of major surgery  Her paternal aunt had breast cancer who survived breast cancer  Her weight is stable  She is a lifetime never smoker  Her performance status is normal  She has significant hot flashes, after she discontinued hormone replacement therapy in September 2018  Interval History:          Objective:     Primary Diagnosis:    Atypical lobular hyperplasia / lobular carcinoma in situ in the right breast   Diagnosed in October 2018  Cancer Staging:  Cancer Staging  No matching staging information was found for the patient  Previous Hematologic/ Oncologic Treatment:         Current Hematologic/ Oncologic Treatment:      Observation  Disease Status:         Test Results:    Pathology:    Right breast biopsy as well as lumpectomy showed atypical lobular hyperplasia / lobular carcinoma in situ  Radiology:    Chest x-ray was negative  Laboratory:        Physical Exam:      General Appearance:    Alert, oriented        Eyes:    PERRL   Ears:    Normal external ear canals, both ears   Nose:   Nares normal, septum midline   Throat:   Mucosa moist  Pharynx without injection      Neck:   Supple       Lungs:     Clear to auscultation bilaterally   Chest Wall:    No tenderness or deformity    Heart:    Regular rate and rhythm       Abdomen:     Soft, non-tender, bowel sounds +, no organomegaly           Extremities:   Extremities no cyanosis or edema       Skin:   no rash or icterus  Lymph nodes:   Cervical, supraclavicular, and axillary nodes normal   Neurologic:   CNII-XII intact, normal strength, sensation and reflexes     Throughout          Breast exam:   Lumpectomy scar at 9:00 position in her right breast with no palpable abnormalities  Left breast exam is negative  ROS: Review of Systems   Constitutional:        Hot flashes   Musculoskeletal:        Arthritic pain   All other systems reviewed and are negative  Imaging: No results found  Labs:   Lab Results   Component Value Date    WBC 6 1 05/10/2016    HGB 14 4 05/10/2016    HCT 44 1 05/10/2016    MCV 95 4 05/10/2016     05/10/2016     Lab Results   Component Value Date     05/10/2016    K 4 5 05/10/2016     05/10/2016    CO2 26 05/10/2016    ANIONGAP 5 06/10/2015    BUN 17 05/10/2016    CREATININE 0 75 05/10/2016    GLUCOSE 95 06/10/2015    CALCIUM 9 9 05/10/2016    AST 19 05/10/2016    ALT 24 05/10/2016    ALKPHOS 50 05/10/2016    PROT 6 8 05/10/2016    BILITOT 0 5 05/10/2016         Vital Sign:    Body surface area is 1 98 meters squared      Wt Readings from Last 3 Encounters:   11/23/18 88 9 kg (196 lb)   11/05/18 89 8 kg (198 lb)   10/23/18 86 2 kg (190 lb)        Temp Readings from Last 3 Encounters:   11/23/18 (!) 97 °F (36 1 °C) (Tympanic)   11/05/18 97 9 °F (36 6 °C)   10/23/18 97 8 °F (36 6 °C)        BP Readings from Last 3 Encounters:   11/23/18 134/90   11/05/18 142/88   10/23/18 139/72         Pulse Readings from Last 3 Encounters:   11/23/18 69   11/05/18 73   10/23/18 71     @LASTSAO2(3)@    Active Problems:   Patient Active Problem List   Diagnosis    Hypertension    Gastroesophageal reflux disease    Insomnia    History of colon polyps    Atypical lobular hyperplasia (ALH) of breast       Past Medical History:   Past Medical History:   Diagnosis Date    Arthritis     Cancer ([address] )     skin    Chest pain     Colon polyps     GERD (gastroesophageal reflux disease)     Hypertension     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        Surgical History:   Past Surgical History:   Procedure Laterality Date    CARPAL TUNNEL RELEASE Left     CTR    COLONOSCOPY  03/2013    repeat in 5 yrs,  Dr Salley Heater Nissen fundoplication    ESOPHAGOGASTRODUODENOSCOPY      FOOT SURGERY Left     plantar fascia     FUNDOPLICATION TRANSORAL      HERNIA REPAIR  2010    MAMMO NEEDLE LOCALIZATION RIGHT (ALL INC) Right 10/23/2018    MAMMO STEREOTACTIC BREAST BIOPSY RIGHT (ALL INC) Right 8/29/2018    POLYPECTOMY  2008    ulcerated polypectomy     TX PERQ DEVICE PLACEMT BREAST LOC 1ST LES W GUIDNCE Right 10/23/2018    Procedure: BREAST LUMPECTOMY; BREAST NEEDLE LOCATION (NEEDLE LOC AT 0830) ; Surgeon: Latha Garcia MD;  [facility];  Service: Surgical Oncology    TONSILLECTOMY      TUBAL LIGATION         Family History:    Family History   Problem Relation Age of Onset    Diabetes Mother     Heart disease Mother     Osteoporosis Mother     Heart disease Father     Skin cancer Father     Cancer Family     Breast cancer Paternal Aunt        Cancer-related family history includes Breast cancer in her paternal aunt; Cancer in her family; Skin cancer in her father  Social History:   Social History     Social History    Marital status: /Civil Union     Spouse name: N/A    Number of children: N/A    Years of education: N/A     Occupational History    Not on file       Social History Main Topics    Smoking status: Never Smoker    Smokeless tobacco: Never Used    Alcohol use Yes      Comment: socially    Drug use: No    Sexual activity: Not on file     Other Topics Concern    Not on file     Social History Narrative    No narrative on file       Current Medications:   Current Outpatient Prescriptions   Medication Sig Dispense Refill    Calcium-Magnesium-Vitamin D Amado Melendrez CALCIUM+D PO) Take by mouth      Cholecalciferol (VITAMIN D) 2000 units CAPS Take 1 tablet by mouth daily      docusate sodium (CVS STOOL SOFTENER) 100 mg capsule Take 1 capsule by mouth daily      Flaxseed, Linseed, (FLAXSEED OIL PO) Take 1,400 mg by mouth daily 1400 mg daily       fluticasone (FLONASE) 50 mcg/act nasal spray 2 sprays into each nostril daily      loratadine (CLARITIN) 10 mg tablet Take 10 mg by mouth daily      Magnesium 250 MG TABS Take 250 mg by mouth 2 (two) times a day      metoprolol succinate (TOPROL-XL) 25 mg 24 hr tablet Take 1 tablet (25 mg total) by mouth 2 (two) times a day 180 tablet 2    Multiple Vitamins-Minerals (WOMENS 50+ MULTI VITAMIN/MIN PO) Take by mouth      RABEprazole (ACIPHEX) 20 MG tablet Take 1 tablet (20 mg total) by mouth daily 90 tablet 3    saccharomyces boulardii (FLORASTOR) 250 mg capsule Take 250 mg by mouth 2 (two) times a day      zolpidem (AMBIEN) 10 mg tablet Take 1 tablet (10 mg total) by mouth as needed (as needed) 30 tablet 0     No current facility-administered medications for this visit  Allergies:    Allergies   Allergen Reactions    Ace Inhibitors Cough    Adhesive [Medical Tape] Itching

## 2018-11-23 NOTE — LETTER
November 23, 2018     So Kellogg MD  [address]    Patient: Montse Sanderson   YOB: 1955   Date of Visit: 11/23/2018       Dear Dr Rian Perry: Thank you for referring Vi Kingston to me for evaluation  Below are my notes for this consultation  If you have questions, please do not hesitate to call me  I look forward to following your patient along with you  Sincerely,        Romy Hernandez MD        CC: MD Romy Diaz MD  11/23/2018  9:10 AM  Sign at close encounter  Hematology / Oncology Outpatient Consult Note    Montse Sanderson 63 y o  female [id number]         Date:  11/23/2018    Assessment / Plan:  A 46-year-old postmenopausal woman with newly diagnosed atypical lobular hyperplasia/ lobular carcinoma in situ in the right breast   She underwent lumpectomy  She was on hormone replacement therapy until the diagnosis of LCIS  She has pre-existing arthritis as well as significant hot flashes  We discussed possible chemoprevention  Since she is postmenopausal, aromatase inhibitor can be considered  Her lifetime risk of breast cancer is probably approximately 30% without chemoprevention  Chemoprevention probably reduced lifetime risk of breast cancer to 15% range  However, there is no survival advantage with chemoprevention  Side effects of anastrozole was thoroughly discussed, including but not limited to hot flashes, musculoskeletal symptoms and bone mineral density loss  After the lengthy discussion, she decided against the use of aromatase inhibitor which I believe very reasonable since there is no survival advantage  She is going to be screened for breast cancer by Dr Landy Pires  She and her  are in agreement with my recommendations  Subjective:     HPI:   A 46-year-old postmenopausal woman who was on hormone replacement therapy for 4 years until September 2018    She was found to have radiographic abnormality in her right breast   Right breast biopsy in late August 2018 showed atypical lobular hyperplasia as well as lobular carcinoma in situ  Therefore, she underwent lumpectomy by Dr Juan Antonio Bruce in October 23, 2018  Lumpectomy specimen showed no evidence of invasive carcinoma  It showed atypical lobular hyperplasia as well as lobular carcinoma in situ  She presents today to discuss possible chemoprevention  She has no complaint of pain  She is in usual status health  She has hypertension, arthritis as well as GERD  She has no history of major surgery  Her paternal aunt had breast cancer who survived breast cancer  Her weight is stable  She is a lifetime never smoker  Her performance status is normal  She has significant hot flashes, after she discontinued hormone replacement therapy in September 2018  Interval History:          Objective:     Primary Diagnosis:    Atypical lobular hyperplasia / lobular carcinoma in situ in the right breast   Diagnosed in October 2018  Cancer Staging:  Cancer Staging  No matching staging information was found for the patient  Previous Hematologic/ Oncologic Treatment:         Current Hematologic/ Oncologic Treatment:      Observation  Disease Status:         Test Results:    Pathology:    Right breast biopsy as well as lumpectomy showed atypical lobular hyperplasia / lobular carcinoma in situ  Radiology:    Chest x-ray was negative  Laboratory:        Physical Exam:      General Appearance:    Alert, oriented        Eyes:    PERRL   Ears:    Normal external ear canals, both ears   Nose:   Nares normal, septum midline   Throat:   Mucosa moist  Pharynx without injection      Neck:   Supple       Lungs:     Clear to auscultation bilaterally   Chest Wall:    No tenderness or deformity    Heart:    Regular rate and rhythm       Abdomen:     Soft, non-tender, bowel sounds +, no organomegaly           Extremities:   Extremities no cyanosis or edema       Skin:   no rash or icterus  Lymph nodes:   Cervical, supraclavicular, and axillary nodes normal   Neurologic:   CNII-XII intact, normal strength, sensation and reflexes     Throughout          Breast exam:   Lumpectomy scar at 9:00 position in her right breast with no palpable abnormalities  Left breast exam is negative  ROS: Review of Systems   Constitutional:        Hot flashes   Musculoskeletal:        Arthritic pain   All other systems reviewed and are negative  Imaging: No results found  Labs:   Lab Results   Component Value Date    WBC 6 1 05/10/2016    HGB 14 4 05/10/2016    HCT 44 1 05/10/2016    MCV 95 4 05/10/2016     05/10/2016     Lab Results   Component Value Date     05/10/2016    K 4 5 05/10/2016     05/10/2016    CO2 26 05/10/2016    ANIONGAP 5 06/10/2015    BUN 17 05/10/2016    CREATININE 0 75 05/10/2016    GLUCOSE 95 06/10/2015    CALCIUM 9 9 05/10/2016    AST 19 05/10/2016    ALT 24 05/10/2016    ALKPHOS 50 05/10/2016    PROT 6 8 05/10/2016    BILITOT 0 5 05/10/2016         Vital Sign:    Body surface area is 1 98 meters squared      Wt Readings from Last 3 Encounters:   11/23/18 88 9 kg (196 lb)   11/05/18 89 8 kg (198 lb)   10/23/18 86 2 kg (190 lb)        Temp Readings from Last 3 Encounters:   11/23/18 (!) 97 °F (36 1 °C) (Tympanic)   11/05/18 97 9 °F (36 6 °C)   10/23/18 97 8 °F (36 6 °C)        BP Readings from Last 3 Encounters:   11/23/18 134/90   11/05/18 142/88   10/23/18 139/72         Pulse Readings from Last 3 Encounters:   11/23/18 69   11/05/18 73   10/23/18 71     @LASTSAO2(3)@    Active Problems:   Patient Active Problem List   Diagnosis    Hypertension    Gastroesophageal reflux disease    Insomnia    History of colon polyps    Atypical lobular hyperplasia (ALH) of breast       Past Medical History:   Past Medical History:   Diagnosis Date    Arthritis     Cancer ([address] )     skin    Chest pain     Colon polyps     GERD (gastroesophageal reflux disease)     Hypertension     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        Surgical History:   Past Surgical History:   Procedure Laterality Date    CARPAL TUNNEL RELEASE Left     CTR    COLONOSCOPY  03/2013    repeat in 5 yrs,  Dr Thomas Daughters      Nissen fundoplication    ESOPHAGOGASTRODUODENOSCOPY      FOOT SURGERY Left     plantar fascia     FUNDOPLICATION TRANSORAL      HERNIA REPAIR  2010    MAMMO NEEDLE LOCALIZATION RIGHT (ALL INC) Right 10/23/2018    MAMMO STEREOTACTIC BREAST BIOPSY RIGHT (ALL INC) Right 8/29/2018    POLYPECTOMY  2008    ulcerated polypectomy     IN PERQ DEVICE PLACEMT BREAST LOC 1ST LES W GUIDNCE Right 10/23/2018    Procedure: BREAST LUMPECTOMY; BREAST NEEDLE LOCATION (NEEDLE LOC AT 0830) ; Surgeon: Twyla Rosales MD;  [facility];  Service: Surgical Oncology    TONSILLECTOMY      TUBAL LIGATION         Family History:    Family History   Problem Relation Age of Onset    Diabetes Mother     Heart disease Mother     Osteoporosis Mother     Heart disease Father     Skin cancer Father     Cancer Family     Breast cancer Paternal Aunt        Cancer-related family history includes Breast cancer in her paternal aunt; Cancer in her family; Skin cancer in her father  Social History:   Social History     Social History    Marital status: /Civil Union     Spouse name: N/A    Number of children: N/A    Years of education: N/A     Occupational History    Not on file       Social History Main Topics    Smoking status: Never Smoker    Smokeless tobacco: Never Used    Alcohol use Yes      Comment: socially    Drug use: No    Sexual activity: Not on file     Other Topics Concern    Not on file     Social History Narrative    No narrative on file       Current Medications:   Current Outpatient Prescriptions   Medication Sig Dispense Refill    Calcium-Magnesium-Vitamin D ([place] CALCIUM+D PO) Take by mouth      Cholecalciferol (VITAMIN D) 2000 units CAPS Take 1 tablet by mouth daily      docusate sodium ([employer] STOOL SOFTENER) 100 mg capsule Take 1 capsule by mouth daily      Flaxseed, Linseed, (FLAXSEED OIL PO) Take 1,400 mg by mouth daily 1400 mg daily       fluticasone (FLONASE) 50 mcg/act nasal spray 2 sprays into each nostril daily      loratadine (CLARITIN) 10 mg tablet Take 10 mg by mouth daily      Magnesium 250 MG TABS Take 250 mg by mouth 2 (two) times a day      metoprolol succinate (TOPROL-XL) 25 mg 24 hr tablet Take 1 tablet (25 mg total) by mouth 2 (two) times a day 180 tablet 2    Multiple Vitamins-Minerals (WOMENS 50+ MULTI VITAMIN/MIN PO) Take by mouth      RABEprazole (ACIPHEX) 20 MG tablet Take 1 tablet (20 mg total) by mouth daily 90 tablet 3    saccharomyces boulardii (FLORASTOR) 250 mg capsule Take 250 mg by mouth 2 (two) times a day      zolpidem (AMBIEN) 10 mg tablet Take 1 tablet (10 mg total) by mouth as needed (as needed) 30 tablet 0     No current facility-administered medications for this visit  Allergies:    Allergies   Allergen Reactions    Ace Inhibitors Cough    Adhesive [Medical Tape] Itching

## 2018-11-23 NOTE — PROGRESS NOTES
Hematology / Oncology Outpatient Consult Note    Bruno Aguila 63 y o  female [id number]         Date:  11/23/2018    Assessment / Plan:  A 81-year-old postmenopausal woman with newly diagnosed atypical lobular hyperplasia/ lobular carcinoma in situ in the right breast   She underwent lumpectomy  She was on hormone replacement therapy until the diagnosis of LCIS  She has pre-existing arthritis as well as significant hot flashes  We discussed possible chemoprevention  Since she is postmenopausal, aromatase inhibitor can be considered  Her lifetime risk of breast cancer is probably approximately 30% without chemoprevention  Chemoprevention probably reduced lifetime risk of breast cancer to 15% range  However, there is no survival advantage with chemoprevention  Side effects of anastrozole was thoroughly discussed, including but not limited to hot flashes, musculoskeletal symptoms and bone mineral density loss  After the lengthy discussion, she decided against the use of aromatase inhibitor which I believe very reasonable since there is no survival advantage  She is going to be screened for breast cancer by Dr Yelena Sanchez  She and her  are in agreement with my recommendations  Subjective:     HPI:   A 81-year-old postmenopausal woman who was on hormone replacement therapy for 4 years until September 2018  She was found to have radiographic abnormality in her right breast   Right breast biopsy in late August 2018 showed atypical lobular hyperplasia as well as lobular carcinoma in situ  Therefore, she underwent lumpectomy by Dr Yelena Sanchez in October 23, 2018  Lumpectomy specimen showed no evidence of invasive carcinoma  It showed atypical lobular hyperplasia as well as lobular carcinoma in situ  She presents today to discuss possible chemoprevention  She has no complaint of pain  She is in usual status health  She has hypertension, arthritis as well as GERD    She has no history of major surgery  Her paternal aunt had breast cancer who survived breast cancer  Her weight is stable  She is a lifetime never smoker  Her performance status is normal  She has significant hot flashes, after she discontinued hormone replacement therapy in September 2018  Interval History:          Objective:     Primary Diagnosis:    Atypical lobular hyperplasia / lobular carcinoma in situ in the right breast   Diagnosed in October 2018  Cancer Staging:  Cancer Staging  No matching staging information was found for the patient  Previous Hematologic/ Oncologic Treatment:         Current Hematologic/ Oncologic Treatment:      Observation  Disease Status:         Test Results:    Pathology:    Right breast biopsy as well as lumpectomy showed atypical lobular hyperplasia / lobular carcinoma in situ  Radiology:    Chest x-ray was negative  Laboratory:        Physical Exam:      General Appearance:    Alert, oriented        Eyes:    PERRL   Ears:    Normal external ear canals, both ears   Nose:   Nares normal, septum midline   Throat:   Mucosa moist  Pharynx without injection  Neck:   Supple       Lungs:     Clear to auscultation bilaterally   Chest Wall:    No tenderness or deformity    Heart:    Regular rate and rhythm       Abdomen:     Soft, non-tender, bowel sounds +, no organomegaly           Extremities:   Extremities no cyanosis or edema       Skin:   no rash or icterus  Lymph nodes:   Cervical, supraclavicular, and axillary nodes normal   Neurologic:   CNII-XII intact, normal strength, sensation and reflexes     Throughout          Breast exam:   Lumpectomy scar at 9:00 position in her right breast with no palpable abnormalities  Left breast exam is negative  ROS: Review of Systems   Constitutional:        Hot flashes   Musculoskeletal:        Arthritic pain   All other systems reviewed and are negative  Imaging: No results found        Labs:   Lab Results   Component Value Date    WBC 6 1 05/10/2016    HGB 14 4 05/10/2016    HCT 44 1 05/10/2016    MCV 95 4 05/10/2016     05/10/2016     Lab Results   Component Value Date     05/10/2016    K 4 5 05/10/2016     05/10/2016    CO2 26 05/10/2016    ANIONGAP 5 06/10/2015    BUN 17 05/10/2016    CREATININE 0 75 05/10/2016    GLUCOSE 95 06/10/2015    CALCIUM 9 9 05/10/2016    AST 19 05/10/2016    ALT 24 05/10/2016    ALKPHOS 50 05/10/2016    PROT 6 8 05/10/2016    BILITOT 0 5 05/10/2016         Vital Sign:    Body surface area is 1 98 meters squared      Wt Readings from Last 3 Encounters:   11/23/18 88 9 kg (196 lb)   11/05/18 89 8 kg (198 lb)   10/23/18 86 2 kg (190 lb)        Temp Readings from Last 3 Encounters:   11/23/18 (!) 97 °F (36 1 °C) (Tympanic)   11/05/18 97 9 °F (36 6 °C)   10/23/18 97 8 °F (36 6 °C)        BP Readings from Last 3 Encounters:   11/23/18 134/90   11/05/18 142/88   10/23/18 139/72         Pulse Readings from Last 3 Encounters:   11/23/18 69   11/05/18 73   10/23/18 71     @LASTSAO2(3)@    Active Problems:   Patient Active Problem List   Diagnosis    Hypertension    Gastroesophageal reflux disease    Insomnia    History of colon polyps    Atypical lobular hyperplasia (ALH) of breast       Past Medical History:   Past Medical History:   Diagnosis Date    Arthritis     Cancer ([address] )     skin    Chest pain     Colon polyps     GERD (gastroesophageal reflux disease)     Hypertension     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        Surgical History:   Past Surgical History:   Procedure Laterality Date    CARPAL TUNNEL RELEASE Left     CTR    COLONOSCOPY  03/2013    repeat in 5 yrs,  Dr Moni Hollis      Nissen fundoplication    ESOPHAGOGASTRODUODENOSCOPY      FOOT SURGERY Left     plantar fascia     FUNDOPLICATION TRANSORAL      HERNIA REPAIR  2010    MAMMO NEEDLE LOCALIZATION RIGHT (ALL INC) Right 10/23/2018    MAMMO STEREOTACTIC BREAST BIOPSY RIGHT (ALL INC) Right 8/29/2018    POLYPECTOMY  2008    ulcerated polypectomy     [place] PERQ DEVICE PLACEMT BREAST LOC 1ST LES W [place] Right 10/23/2018    Procedure: BREAST LUMPECTOMY; BREAST NEEDLE LOCATION (NEEDLE LOC AT 0830) ; Surgeon: Mila Walter MD;  [facility];  Service: Surgical Oncology    TONSILLECTOMY      TUBAL LIGATION         Family History:    Family History   Problem Relation Age of Onset    Diabetes Mother     Heart disease Mother     Osteoporosis Mother     Heart disease Father     Skin cancer Father     Cancer Family     Breast cancer Paternal Aunt        Cancer-related family history includes Breast cancer in her paternal aunt; Cancer in her family; Skin cancer in her father  Social History:   Social History     Social History    Marital status: /Civil Union     Spouse name: N/A    Number of children: N/A    Years of education: N/A     Occupational History    Not on file       Social History Main Topics    Smoking status: Never Smoker    Smokeless tobacco: Never Used    Alcohol use Yes      Comment: socially    Drug use: No    Sexual activity: Not on file     Other Topics Concern    Not on file     Social History Narrative    No narrative on file       Current Medications:   Current Outpatient Prescriptions   Medication Sig Dispense Refill    Calcium-Magnesium-Vitamin D (CITRACAL CALCIUM+D PO) Take by mouth      Cholecalciferol (VITAMIN D) 2000 units CAPS Take 1 tablet by mouth daily      docusate sodium ([employer] STOOL SOFTENER) 100 mg capsule Take 1 capsule by mouth daily      Flaxseed, Linseed, (FLAXSEED OIL PO) Take 1,400 mg by mouth daily 1400 mg daily       fluticasone (FLONASE) 50 mcg/act nasal spray 2 sprays into each nostril daily      loratadine (CLARITIN) 10 mg tablet Take 10 mg by mouth daily      Magnesium 250 MG TABS Take 250 mg by mouth 2 (two) times a day      metoprolol succinate (TOPROL-XL) 25 mg 24 hr tablet Take 1 tablet (25 mg total) by mouth 2 (two) times a day 180 tablet 2    Multiple Vitamins-Minerals (WOMENS 50+ MULTI VITAMIN/MIN PO) Take by mouth      RABEprazole (ACIPHEX) 20 MG tablet Take 1 tablet (20 mg total) by mouth daily 90 tablet 3    saccharomyces boulardii (FLORASTOR) 250 mg capsule Take 250 mg by mouth 2 (two) times a day      zolpidem (AMBIEN) 10 mg tablet Take 1 tablet (10 mg total) by mouth as needed (as needed) 30 tablet 0     No current facility-administered medications for this visit  Allergies:    Allergies   Allergen Reactions    Ace Inhibitors Cough    Adhesive [Medical Tape] Itching

## 2018-12-21 ENCOUNTER — OFFICE VISIT (OUTPATIENT)
Dept: FAMILY MEDICINE CLINIC | Facility: CLINIC | Age: 63
End: 2018-12-21
Payer: COMMERCIAL

## 2018-12-21 VITALS
TEMPERATURE: 98.5 F | RESPIRATION RATE: 16 BRPM | HEART RATE: 70 BPM | BODY MASS INDEX: 31.24 KG/M2 | DIASTOLIC BLOOD PRESSURE: 90 MMHG | HEIGHT: 66 IN | SYSTOLIC BLOOD PRESSURE: 170 MMHG | WEIGHT: 194.4 LBS

## 2018-12-21 DIAGNOSIS — J06.9 UPPER RESPIRATORY TRACT INFECTION, UNSPECIFIED TYPE: Primary | ICD-10-CM

## 2018-12-21 PROCEDURE — 3008F BODY MASS INDEX DOCD: CPT | Performed by: FAMILY MEDICINE

## 2018-12-21 PROCEDURE — 99213 OFFICE O/P EST LOW 20 MIN: CPT | Performed by: FAMILY MEDICINE

## 2018-12-21 PROCEDURE — 1036F TOBACCO NON-USER: CPT | Performed by: FAMILY MEDICINE

## 2018-12-21 RX ORDER — AZITHROMYCIN 250 MG/1
TABLET, FILM COATED ORAL
Qty: 6 TABLET | Refills: 0 | Status: SHIPPED | OUTPATIENT
Start: 2018-12-21 | End: 2018-12-25

## 2018-12-21 NOTE — PROGRESS NOTES
FAMILY PRACTICE OFFICE VISIT       NAME: Dionte Milton  AGE: 66 y o  SEX: female       : 1955        MRN: [id number]    DATE: 2018  TIME: 1:36 PM    Assessment and Plan     Problem List Items Addressed This Visit     Upper respiratory tract infection - Primary    Relevant Medications    azithromycin (ZITHROMAX) 250 mg tablet        Patient given prescription for Zithromax Z-Cooper take as directed for 5 days  Patient may use over-the-counter medications as necessar  Patient will call if symptoms persist after medication completed    There are no Patient Instructions on file for this visit  Chief Complaint     Chief Complaint   Patient presents with    Cough     Patient is here due to a cough, sinus pressure and drainage x 1 week  History of Present Illness     Patient states symptoms began approximately 8 days ago  Her  has also been sick recently was placed on antibiotics  She denies any documented fevers  She is a nonsmoker  She does have significant history of seasonal allergies  Review of Systems   Review of Systems   Constitutional: Positive for fatigue  Negative for fever  HENT: Positive for congestion, sinus pressure and sore throat  Respiratory: Positive for cough  Cardiovascular: Negative  Gastrointestinal: Negative  Musculoskeletal: Negative  Skin: Negative          Active Problem List     Patient Active Problem List   Diagnosis    Hypertension    Acid reflux disease    Insomnia    History of colon polyps    Atypical lobular hyperplasia (ALH) of breast    Benign essential hypertension    Depression with anxiety    Dyslipidemia    Upper respiratory tract infection       Past Medical History:  Past Medical History:   Diagnosis Date    Arthritis     Cancer ([address] )     skin    Chest pain     Colon polyps     GERD (gastroesophageal reflux disease)     Hypertension     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        Past Surgical History:  Past Surgical History:   Procedure Laterality Date    CARPAL TUNNEL RELEASE Left     CTR    COLONOSCOPY  03/2013    repeat in 5 yrs,  Dr Latrice Gilman      Nissen fundoplication    ESOPHAGOGASTRODUODENOSCOPY      FOOT SURGERY Left     plantar fascia     FUNDOPLICATION TRANSORAL      HERNIA REPAIR  2010    MAMMO NEEDLE LOCALIZATION RIGHT (ALL INC) Right 10/23/2018    MAMMO STEREOTACTIC BREAST BIOPSY RIGHT (ALL INC) Right 8/29/2018    POLYPECTOMY  2008    ulcerated polypectomy     LA PERQ DEVICE PLACEMT BREAST LOC 1ST LES W MACARIOE Right 10/23/2018    Procedure: BREAST LUMPECTOMY; BREAST NEEDLE LOCATION (NEEDLE LOC AT 0830) ; Surgeon: Bruno Toro MD;  Location: [facility];  Service: Surgical Oncology    TONSILLECTOMY      TUBAL LIGATION         Family History:  Family History   Problem Relation Age of Onset    Diabetes Mother     Heart disease Mother     Osteoporosis Mother     Heart disease Father     Skin cancer Father     Cancer Family     Breast cancer Paternal Aunt        Social History:  Social History     Social History    Marital status: /Civil Union     Spouse name: N/A    Number of children: N/A    Years of education: N/A     Occupational History    Not on file  Social History Main Topics    Smoking status: Never Smoker    Smokeless tobacco: Never Used    Alcohol use Yes      Comment: socially    Drug use: No    Sexual activity: Not on file     Other Topics Concern    Not on file     Social History Narrative    No narrative on file       Objective     Vitals:    12/21/18 1109   BP: 170/90   Pulse: 70   Resp: 16   Temp: 98 5 °F (36 9 °C)     Wt Readings from Last 3 Encounters:   12/21/18 88 2 kg (194 lb 6 4 oz)   11/23/18 88 9 kg (196 lb)   11/05/18 89 8 kg (198 lb)       Physical Exam   Constitutional: No distress  HENT:   Mouth/Throat: Oropharynx is clear and moist  No oropharyngeal exudate     Tympanic membranes within normal limits bilaterally   Cardiovascular:   Regular rate and rhythm with no murmurs   Pulmonary/Chest:   Lungs are clear to auscultation without wheezes,rales, or rhonchi  Harsh cough   Musculoskeletal: She exhibits no edema  Lymphadenopathy:     She has no cervical adenopathy  Skin: No rash noted         Pertinent Laboratory/Diagnostic Studies:  Lab Results   Component Value Date    GLUCOSE 95 06/10/2015    BUN 17 05/10/2016    CREATININE 0 75 05/10/2016    CALCIUM 9 9 05/10/2016     05/10/2016    K 4 5 05/10/2016    CO2 26 05/10/2016     05/10/2016     Lab Results   Component Value Date    ALT 24 05/10/2016    AST 19 05/10/2016    ALKPHOS 50 05/10/2016    BILITOT 0 5 05/10/2016       Lab Results   Component Value Date    WBC 6 1 05/10/2016    HGB 14 4 05/10/2016    HCT 44 1 05/10/2016    MCV 95 4 05/10/2016     05/10/2016       No results found for: TSH    Lab Results   Component Value Date    CHOL 215 (H) 05/10/2016     Lab Results   Component Value Date    TRIG 89 05/10/2016     Lab Results   Component Value Date    HDL 58 05/10/2016     Lab Results   Component Value Date    LDLCALC 111 (H) 06/10/2015     No results found for: HGBA1C    Results for orders placed or performed in visit on 11/15/18   HPV High Risk   Result Value Ref Range    HPV Other HR Negative Negative    HPV16 Negative Negative    HPV18 Negative Negative   Liquid-based pap, screening   Result Value Ref Range    Case Report       Gynecologic Cytology Report                       Case: [id number]                                  Authorizing Provider:  Teresa James MD            Collected:           11/15/2018 1018              First Screen:          VANITA Temple  Received:            11/16/2018 1018              Specimen:    LIQUID-BASED PAP, SCREENING, Endocervical                                                  Primary Interpretation Negative for intraepithelial lesion or malignancy     Specimen Adequacy Satisfactory for evaluation  (See note)     Note       No endocervical cells identified  It is difficult to differentiate between squamous metaplastic cells and parabasal cells in atrophic specimens due to numerous causes including menopause, postpartum changes and progestational agents  Additional Information       [employer]'s FDA approved ,  and ThinPrep Imaging System are utilized with strict adherence to the 's instruction manual to prepare gynecologic and non-gynecologic cytology specimens for the production of ThinPrep slides as well as for gynecologic ThinPrep imaging  These processes have been validated by our laboratory and/or by the   The Pap test is not a diagnostic procedure and should not be used as the sole means to detect cervical cancer  It is only a screening procedure to aid in the detection of cervical cancer and its precursors  Both false-negative and false-positive results have been experienced  Your patient's test result should be interpreted in this context together with the history and clinical findings  LMP         No orders of the defined types were placed in this encounter        ALLERGIES:  Allergies   Allergen Reactions    Ace Inhibitors Cough    Adhesive [Medical Tape] Itching       Current Medications     Current Outpatient Prescriptions   Medication Sig Dispense Refill    Calcium-Magnesium-Vitamin D (CITRACAL CALCIUM+D PO) Take by mouth      Cholecalciferol (VITAMIN D) 2000 units CAPS Take 1 tablet by mouth daily      docusate sodium ([employer] STOOL SOFTENER) 100 mg capsule Take 1 capsule by mouth daily      Flaxseed, Linseed, (FLAXSEED OIL PO) Take 1,400 mg by mouth daily 1400 mg daily       fluticasone (FLONASE) 50 mcg/act nasal spray 2 sprays into each nostril daily      loratadine (CLARITIN) 10 mg tablet Take 10 mg by mouth daily      Magnesium 250 MG TABS Take 250 mg by mouth 2 (two) times a day      metoprolol succinate (TOPROL-XL) 25 mg 24 hr tablet Take 1 tablet (25 mg total) by mouth 2 (two) times a day 180 tablet 2    Multiple Vitamins-Minerals (WOMENS 50+ MULTI VITAMIN/MIN PO) Take by mouth      RABEprazole (ACIPHEX) 20 MG tablet Take 1 tablet (20 mg total) by mouth daily 90 tablet 3    zolpidem (AMBIEN) 10 mg tablet Take 1 tablet (10 mg total) by mouth as needed (as needed) 30 tablet 0    azithromycin (ZITHROMAX) 250 mg tablet Take 2 tablets today then 1 tablet daily x 4 days 6 tablet 0     No current facility-administered medications for this visit            Health Maintenance     Health Maintenance   Topic Date Due    Hepatitis C Screening  1955    Pneumococcal PPSV23 Highest Risk Adult (1 of 3 - PCV13) 11/27/1976    DTaP,Tdap,and Td Vaccines (1 - Tdap) 11/27/1978    Depression Screening PHQ  02/19/2019    CRC Screening: Colonoscopy  06/11/2028    INFLUENZA VACCINE  Completed     Immunization History   Administered Date(s) Administered    Influenza Quadrivalent Preservative Free 3 years and older IM 11/14/2016, 09/16/2017    Influenza TIV (IM) 10/09/2013, 09/15/2014, 10/02/2015    Influenza, recombinant, quadrivalent,injectable, preservative free 95/46/0064       Crystal Santana MD

## 2019-03-03 DIAGNOSIS — G47.00 INSOMNIA, UNSPECIFIED TYPE: ICD-10-CM

## 2019-03-03 DIAGNOSIS — K21.9 GASTROESOPHAGEAL REFLUX DISEASE, ESOPHAGITIS PRESENCE NOT SPECIFIED: ICD-10-CM

## 2019-03-03 DIAGNOSIS — I10 HYPERTENSION, UNSPECIFIED TYPE: ICD-10-CM

## 2019-03-05 DIAGNOSIS — G47.00 INSOMNIA, UNSPECIFIED TYPE: ICD-10-CM

## 2019-03-05 RX ORDER — ZOLPIDEM TARTRATE 10 MG/1
10 TABLET ORAL AS NEEDED
Qty: 30 TABLET | OUTPATIENT
Start: 2019-03-05

## 2019-03-05 RX ORDER — ZOLPIDEM TARTRATE 10 MG/1
10 TABLET ORAL AS NEEDED
Qty: 30 TABLET | Refills: 1 | Status: SHIPPED | OUTPATIENT
Start: 2019-03-05 | End: 2021-06-21

## 2019-03-05 RX ORDER — METOPROLOL SUCCINATE 25 MG/1
25 TABLET, EXTENDED RELEASE ORAL 2 TIMES DAILY
Qty: 180 TABLET | Refills: 0 | Status: SHIPPED | OUTPATIENT
Start: 2019-03-05 | End: 2019-05-31 | Stop reason: SDUPTHER

## 2019-03-05 RX ORDER — RABEPRAZOLE SODIUM 20 MG/1
20 TABLET, DELAYED RELEASE ORAL DAILY
Qty: 90 TABLET | Refills: 0 | Status: SHIPPED | OUTPATIENT
Start: 2019-03-05 | End: 2019-05-31 | Stop reason: SDUPTHER

## 2019-05-02 PROBLEM — J06.9 UPPER RESPIRATORY TRACT INFECTION: Status: RESOLVED | Noted: 2018-12-21 | Resolved: 2019-05-02

## 2019-05-06 ENCOUNTER — OFFICE VISIT (OUTPATIENT)
Dept: SURGICAL ONCOLOGY | Facility: CLINIC | Age: 64
End: 2019-05-06
Payer: COMMERCIAL

## 2019-05-06 VITALS
SYSTOLIC BLOOD PRESSURE: 168 MMHG | HEART RATE: 72 BPM | HEIGHT: 66 IN | BODY MASS INDEX: 31.82 KG/M2 | RESPIRATION RATE: 18 BRPM | TEMPERATURE: 98.1 F | WEIGHT: 198 LBS | DIASTOLIC BLOOD PRESSURE: 98 MMHG

## 2019-05-06 DIAGNOSIS — Z91.89 INCREASED RISK OF BREAST CANCER: ICD-10-CM

## 2019-05-06 DIAGNOSIS — N60.99 ATYPICAL LOBULAR HYPERPLASIA (ALH) OF BREAST: Primary | ICD-10-CM

## 2019-05-06 PROCEDURE — 99213 OFFICE O/P EST LOW 20 MIN: CPT | Performed by: SURGERY

## 2019-05-13 ENCOUNTER — TELEPHONE (OUTPATIENT)
Dept: FAMILY MEDICINE CLINIC | Facility: CLINIC | Age: 64
End: 2019-05-13

## 2019-05-13 DIAGNOSIS — K21.9 GASTROESOPHAGEAL REFLUX DISEASE, ESOPHAGITIS PRESENCE NOT SPECIFIED: ICD-10-CM

## 2019-05-13 DIAGNOSIS — I10 BENIGN ESSENTIAL HYPERTENSION: Primary | ICD-10-CM

## 2019-05-13 DIAGNOSIS — E78.5 DYSLIPIDEMIA: ICD-10-CM

## 2019-05-31 ENCOUNTER — OFFICE VISIT (OUTPATIENT)
Dept: FAMILY MEDICINE CLINIC | Facility: CLINIC | Age: 64
End: 2019-05-31
Payer: COMMERCIAL

## 2019-05-31 VITALS
WEIGHT: 195.25 LBS | OXYGEN SATURATION: 97 % | BODY MASS INDEX: 31.38 KG/M2 | HEIGHT: 66 IN | HEART RATE: 63 BPM | SYSTOLIC BLOOD PRESSURE: 140 MMHG | TEMPERATURE: 98.2 F | DIASTOLIC BLOOD PRESSURE: 90 MMHG | RESPIRATION RATE: 18 BRPM

## 2019-05-31 DIAGNOSIS — I10 HYPERTENSION, UNSPECIFIED TYPE: ICD-10-CM

## 2019-05-31 DIAGNOSIS — I10 BENIGN ESSENTIAL HYPERTENSION: ICD-10-CM

## 2019-05-31 DIAGNOSIS — M45.9 ANKYLOSING SPONDYLITIS, UNSPECIFIED SITE OF SPINE (HCC): ICD-10-CM

## 2019-05-31 DIAGNOSIS — N95.1 SYMPTOMATIC MENOPAUSAL OR FEMALE CLIMACTERIC STATES: ICD-10-CM

## 2019-05-31 DIAGNOSIS — K21.9 GASTROESOPHAGEAL REFLUX DISEASE, ESOPHAGITIS PRESENCE NOT SPECIFIED: ICD-10-CM

## 2019-05-31 DIAGNOSIS — Z00.00 WELL ADULT EXAM: Primary | ICD-10-CM

## 2019-05-31 DIAGNOSIS — N60.99 ATYPICAL LOBULAR HYPERPLASIA (ALH) OF BREAST: ICD-10-CM

## 2019-05-31 DIAGNOSIS — Z86.010 HISTORY OF COLON POLYPS: ICD-10-CM

## 2019-05-31 DIAGNOSIS — E66.09 CLASS 1 OBESITY DUE TO EXCESS CALORIES WITHOUT SERIOUS COMORBIDITY WITH BODY MASS INDEX (BMI) OF 31.0 TO 31.9 IN ADULT: ICD-10-CM

## 2019-05-31 PROCEDURE — 99396 PREV VISIT EST AGE 40-64: CPT | Performed by: FAMILY MEDICINE

## 2019-05-31 RX ORDER — TRIAMTERENE AND HYDROCHLOROTHIAZIDE 37.5; 25 MG/1; MG/1
1 CAPSULE ORAL EVERY MORNING
Qty: 30 CAPSULE | Refills: 1 | Status: SHIPPED | OUTPATIENT
Start: 2019-05-31 | End: 2019-06-25 | Stop reason: SDUPTHER

## 2019-05-31 RX ORDER — RABEPRAZOLE SODIUM 20 MG/1
20 TABLET, DELAYED RELEASE ORAL DAILY
Qty: 90 TABLET | Refills: 3 | Status: SHIPPED | OUTPATIENT
Start: 2019-05-31 | End: 2019-12-20 | Stop reason: SDUPTHER

## 2019-05-31 RX ORDER — VENLAFAXINE HYDROCHLORIDE 37.5 MG/1
37.5 CAPSULE, EXTENDED RELEASE ORAL DAILY
Qty: 30 CAPSULE | Refills: 3 | Status: SHIPPED | OUTPATIENT
Start: 2019-05-31 | End: 2019-09-18 | Stop reason: SDUPTHER

## 2019-05-31 RX ORDER — METOPROLOL SUCCINATE 25 MG/1
25 TABLET, EXTENDED RELEASE ORAL 2 TIMES DAILY
Qty: 180 TABLET | Refills: 3 | Status: SHIPPED | OUTPATIENT
Start: 2019-05-31 | End: 2019-12-20 | Stop reason: SDUPTHER

## 2019-06-04 PROBLEM — E66.09 CLASS 1 OBESITY DUE TO EXCESS CALORIES WITHOUT SERIOUS COMORBIDITY WITH BODY MASS INDEX (BMI) OF 31.0 TO 31.9 IN ADULT: Status: ACTIVE | Noted: 2019-06-04

## 2019-06-04 PROBLEM — E66.811 CLASS 1 OBESITY DUE TO EXCESS CALORIES WITHOUT SERIOUS COMORBIDITY WITH BODY MASS INDEX (BMI) OF 31.0 TO 31.9 IN ADULT: Status: ACTIVE | Noted: 2019-06-04

## 2019-06-24 ENCOUNTER — TELEPHONE (OUTPATIENT)
Dept: FAMILY MEDICINE CLINIC | Facility: CLINIC | Age: 64
End: 2019-06-24

## 2019-06-25 DIAGNOSIS — I10 BENIGN ESSENTIAL HYPERTENSION: ICD-10-CM

## 2019-06-25 RX ORDER — TRIAMTERENE AND HYDROCHLOROTHIAZIDE 37.5; 25 MG/1; MG/1
1 CAPSULE ORAL EVERY MORNING
Qty: 30 CAPSULE | Refills: 3 | Status: SHIPPED | OUTPATIENT
Start: 2019-06-25 | End: 2019-09-17 | Stop reason: SDUPTHER

## 2019-08-30 ENCOUNTER — HOSPITAL ENCOUNTER (OUTPATIENT)
Dept: MAMMOGRAPHY | Facility: CLINIC | Age: 64
Discharge: HOME | End: 2019-08-30
Payer: COMMERCIAL

## 2019-08-30 VITALS — HEIGHT: 66 IN | BODY MASS INDEX: 31.34 KG/M2 | WEIGHT: 195 LBS

## 2019-08-30 DIAGNOSIS — N60.99 ATYPICAL LOBULAR HYPERPLASIA (ALH) OF BREAST: ICD-10-CM

## 2019-08-30 PROCEDURE — 77067 SCR MAMMO BI INCL CAD: CPT

## 2019-08-30 PROCEDURE — 77063 BREAST TOMOSYNTHESIS BI: CPT

## 2019-09-04 ENCOUNTER — EVALUATION (OUTPATIENT)
Dept: PHYSICAL THERAPY | Age: 64
End: 2019-09-04
Payer: COMMERCIAL

## 2019-09-04 DIAGNOSIS — M72.2 PLANTAR FASCIITIS: Primary | ICD-10-CM

## 2019-09-04 PROCEDURE — 97161 PT EVAL LOW COMPLEX 20 MIN: CPT | Performed by: PHYSICAL THERAPIST

## 2019-09-04 PROCEDURE — 97110 THERAPEUTIC EXERCISES: CPT | Performed by: PHYSICAL THERAPIST

## 2019-09-04 NOTE — PROGRESS NOTES
PT Evaluation     Today's date: 2019  Patient name: Eric Damon  : 1955  MRN: [id number]  Referring provider: Shayan Cordon DPM  Dx:   Encounter Diagnosis     ICD-10-CM    1  Plantar fasciitis M72 2                   Assessment  Assessment details: Patient with mid-arch plantar fascitis - tenderness, pain, decreased flexibility, and decreased ROM  Impairments: abnormal or restricted ROM, impaired physical strength, lacks appropriate home exercise program and pain with function  Understanding of Dx/Px/POC: good   Prognosis: good    Goals  Short Term goals - 4 weeks  1  Patient will be independent HEP  2   Patient will report a 50% decrease in pain complaints  3   Increase strength 1/2 grade  4   Increase ROM 5-10 degrees  Long Term goals - 8 weeks  1  Patient will report elimination of pain complaints  2   Patient will return to all work related activities without restriction  3   Patient will return to all recreational activities without restriction  4   ROM WFL  5   Strength 5/5  Plan  Planned therapy interventions: joint mobilization, manual therapy, patient education, strengthening and stretching  Frequency: 2x week  Duration in weeks: 4        Subjective Evaluation    History of Present Illness  Mechanism of injury: Patient increased acute onset of R arch pain in August after walking  Current sx's:  R arch pain which is aggravated by prolonged WB'ing and sit-to-stand movement after prolonged sitting  Quality of life: good    Pain  Current pain ratin  At best pain rating: 3  At worst pain ratin  Quality: sharp, throbbing and pressure  Aggravating factors: standing and walking    Patient Goals  Patient goals for therapy: decreased edema, decreased pain, increased motion, increased strength, independence with ADLs/IADLs and return to sport/leisure activities          Objective     Tenderness     Right Ankle/Foot   Tenderness in the plantar fascia       Active Range of Motion     Right Ankle/Foot   Dorsiflexion (ke): 17 degrees   Plantar flexion: 60 degrees   Inversion: 20 degrees   Eversion: 14 degrees     Strength/Myotome Testing     Right Ankle/Foot   Dorsiflexion: 5  Plantar flexion: 5  Inversion: 5  Eversion: 5  Great toe flexion: 4+             Precautions: None      Manual                                                                                   Exercise Diary                                                                                                                                                                                                                                                                                      Modalities

## 2019-09-06 ENCOUNTER — OFFICE VISIT (OUTPATIENT)
Dept: PHYSICAL THERAPY | Age: 64
End: 2019-09-06
Payer: COMMERCIAL

## 2019-09-06 DIAGNOSIS — M72.2 PLANTAR FASCIITIS: Primary | ICD-10-CM

## 2019-09-06 PROCEDURE — 97140 MANUAL THERAPY 1/> REGIONS: CPT | Performed by: PHYSICAL THERAPIST

## 2019-09-06 NOTE — PROGRESS NOTES
Daily Note     Today's date: 2019  Patient name: Lorna Aguilar  : 1955  MRN: [id number]  Referring provider: Renny Escobar DPM  Dx:   Encounter Diagnosis     ICD-10-CM    1  Plantar fasciitis M72 2                   Subjective: No change      Objective: See treatment diary below      Assessment: Tolerated treatment well  Patient would benefit from continued PT      Plan: Continue per plan of care        Precautions: None      Manual              DF stretching x5            Toe ext stretching x5            Graston/STM to plantar fascia 15 minutes                                          Exercise Diary                                        Review HEP completed                         Cybex single leg press 2x10 - 40#            Cybex - double heel raises 2x10 - 40#                                                                                                                                                                                                      Modalities

## 2019-09-09 ENCOUNTER — OFFICE VISIT (OUTPATIENT)
Dept: PHYSICAL THERAPY | Age: 64
End: 2019-09-09
Payer: COMMERCIAL

## 2019-09-09 DIAGNOSIS — M72.2 PLANTAR FASCIITIS: Primary | ICD-10-CM

## 2019-09-09 PROCEDURE — 97140 MANUAL THERAPY 1/> REGIONS: CPT | Performed by: PHYSICAL THERAPIST

## 2019-09-09 NOTE — PROGRESS NOTES
Daily Note     Today's date: 2019  Patient name: Pebbles Hua  : 1955  MRN: [id number]  Referring provider: Darrne Alexandre DPM  Dx:   Encounter Diagnosis     ICD-10-CM    1  Plantar fasciitis M72 2                   Subjective: Better over the weekend      Objective: See treatment diary below      Assessment: Tolerated treatment well  Patient would benefit from continued PT      Plan: Continue per plan of care  Precautions: None      Manual             DF stretching x5 x5           Toe ext stretching x5 x5           Graston/STM to plantar fascia 15 minutes 15 minutes                                         Exercise Diary                                        Review HEP completed                         Cybex single leg press 2x10 - 40# nt           Cybex - double heel raises 2x10 - 40# nt                          SLS on blue foam - 10 reps hold 10 sec                                                                                                                                                                             Modalities

## 2019-09-12 ENCOUNTER — OFFICE VISIT (OUTPATIENT)
Dept: PHYSICAL THERAPY | Age: 64
End: 2019-09-12
Payer: COMMERCIAL

## 2019-09-12 DIAGNOSIS — M72.2 PLANTAR FASCIITIS: Primary | ICD-10-CM

## 2019-09-12 PROCEDURE — 97140 MANUAL THERAPY 1/> REGIONS: CPT | Performed by: PHYSICAL THERAPIST

## 2019-09-12 NOTE — PROGRESS NOTES
Daily Note     Today's date: 2019  Patient name: Sabine Douglas  : 1955  MRN: [id number]  Referring provider: Gopal Mederos DPM  Dx:   Encounter Diagnosis     ICD-10-CM    1  Plantar fasciitis M72 2                   Subjective: R foot feeling good  Objective: See treatment diary below      Assessment: Tolerated treatment well  Patient would benefit from continued PT      Plan: Continue per plan of care        Precautions: None      Manual            DF stretching x5 x5 x5          Toe ext stretching x5 x5 x5          Graston/STM to plantar fascia 15 minutes 15 minutes 15 minutes                                        Exercise Diary                                        Review HEP completed                         Cybex single leg press 2x10 - 40# nt           Cybex - double heel raises 2x10 - 40# nt                          SLS on blue foam - 10 reps hold 10 sec  completed                                                                                                                                                                          Modalities

## 2019-09-16 ENCOUNTER — OFFICE VISIT (OUTPATIENT)
Dept: PHYSICAL THERAPY | Age: 64
End: 2019-09-16
Payer: COMMERCIAL

## 2019-09-16 DIAGNOSIS — M72.2 PLANTAR FASCIITIS: Primary | ICD-10-CM

## 2019-09-16 PROCEDURE — 97140 MANUAL THERAPY 1/> REGIONS: CPT

## 2019-09-16 NOTE — PROGRESS NOTES
Daily Note     Today's date: 2019  Patient name: Gerald Samuel  : 1955  MRN: [id number]  Referring provider: Carolynn Rosado DPM  Dx:   Encounter Diagnosis     ICD-10-CM    1  Plantar fasciitis M72 2                   Subjective: Pt states her left foot bothers her more then her right foot does  Pt states her pain in her right foot is doing better as she is able to wear her shoes but she continues to have a lump there  Objective: See treatment diary below      Assessment: Pt tolerated manual therapy well as IASTM was slightly more agressive  Pt continues to have a bump along her plantar that does not seem to be going away  Pt is less tender than she has been  Pt would continue to benefit from PT  Plan: Continue per plan of care        Precautions: None      Manual           DF stretching x5 x5 x5 5x          Toe ext stretching x5 x5 x5 5 x          Graston/STM to plantar fascia 15 minutes 15 minutes 15 minutes 15 min                                        Exercise Diary                                        Review HEP completed                         Cybex single leg press 2x10 - 40# nt           Cybex - double heel raises 2x10 - 40# nt                          SLS on blue foam - 10 reps hold 10 sec  completed completed                                                                                                                                                                          Modalities

## 2019-09-17 DIAGNOSIS — I10 BENIGN ESSENTIAL HYPERTENSION: ICD-10-CM

## 2019-09-18 ENCOUNTER — OFFICE VISIT (OUTPATIENT)
Dept: PHYSICAL THERAPY | Age: 64
End: 2019-09-18
Payer: COMMERCIAL

## 2019-09-18 DIAGNOSIS — M72.2 PLANTAR FASCIITIS: Primary | ICD-10-CM

## 2019-09-18 DIAGNOSIS — N95.1 SYMPTOMATIC MENOPAUSAL OR FEMALE CLIMACTERIC STATES: ICD-10-CM

## 2019-09-18 PROCEDURE — 97140 MANUAL THERAPY 1/> REGIONS: CPT | Performed by: PHYSICAL THERAPIST

## 2019-09-18 RX ORDER — VENLAFAXINE HYDROCHLORIDE 37.5 MG/1
37.5 CAPSULE, EXTENDED RELEASE ORAL DAILY
Qty: 30 CAPSULE | Refills: 2 | Status: SHIPPED | OUTPATIENT
Start: 2019-09-18 | End: 2019-12-14 | Stop reason: SDUPTHER

## 2019-09-18 RX ORDER — TRIAMTERENE AND HYDROCHLOROTHIAZIDE 37.5; 25 MG/1; MG/1
1 CAPSULE ORAL EVERY MORNING
Qty: 30 CAPSULE | Refills: 2 | Status: SHIPPED | OUTPATIENT
Start: 2019-09-18 | End: 2019-12-20 | Stop reason: SDUPTHER

## 2019-09-18 NOTE — PROGRESS NOTES
Daily Note     Today's date: 2019  Patient name: Alberto Hein  : 1955  MRN: [id number]  Referring provider: Freddy Black DPM  Dx:   Encounter Diagnosis     ICD-10-CM    1  Plantar fasciitis M72 2                   Subjective: Overall, better but sx's persist in am and after prolonged standing activities  Objective: See treatment diary below      Assessment: Tolerated treatment well  Patient would benefit from continued PT      Plan: Continue per plan of care  Precautions: None      Manual          DF stretching x5 x5 x5 5x  x5        Toe ext stretching x5 x5 x5 5 x  x5        Graston/STM to plantar fascia 15 minutes 15 minutes 15 minutes 15 min  30 minutes                                      Exercise Diary                                        Review HEP completed                         Cybex single leg press 2x10 - 40# nt           Cybex - double heel raises 2x10 - 40# nt                          SLS on blue foam - 10 reps hold 10 sec  completed completed  x10 reps hold 30 sec                                                                                                                                                                          Modalities

## 2019-09-24 ENCOUNTER — APPOINTMENT (OUTPATIENT)
Dept: PHYSICAL THERAPY | Age: 64
End: 2019-09-24
Payer: COMMERCIAL

## 2019-10-26 ENCOUNTER — TELEPHONE (OUTPATIENT)
Dept: OTHER | Facility: OTHER | Age: 64
End: 2019-10-26

## 2019-10-28 ENCOUNTER — TELEPHONE (OUTPATIENT)
Dept: FAMILY MEDICINE CLINIC | Facility: CLINIC | Age: 64
End: 2019-10-28

## 2019-11-21 ENCOUNTER — ANNUAL EXAM (OUTPATIENT)
Dept: OBGYN CLINIC | Facility: CLINIC | Age: 64
End: 2019-11-21
Payer: COMMERCIAL

## 2019-11-21 VITALS
SYSTOLIC BLOOD PRESSURE: 140 MMHG | DIASTOLIC BLOOD PRESSURE: 80 MMHG | BODY MASS INDEX: 30.61 KG/M2 | WEIGHT: 195 LBS | HEIGHT: 67 IN

## 2019-11-21 DIAGNOSIS — Z12.31 ENCOUNTER FOR SCREENING MAMMOGRAM FOR BREAST CANCER: Primary | ICD-10-CM

## 2019-11-21 DIAGNOSIS — Z01.419 ENCOUNTER FOR ANNUAL ROUTINE GYNECOLOGICAL EXAMINATION: ICD-10-CM

## 2019-11-21 PROCEDURE — S0612 ANNUAL GYNECOLOGICAL EXAMINA: HCPCS | Performed by: OBSTETRICS & GYNECOLOGY

## 2019-11-21 NOTE — PROGRESS NOTES
Assessment:    Normal breast and GYN exam  Hot flashes    Plan:    Rx mammo  Encouraged healthy diet and exercise  Information on hot flashes given with natural remedies except soy    Subjective:      Patient ID: Mariel Monaco is a 66 y  o  female  History of R breast CA 2018  Colonoscopy 18 polyps removed and due in 1 year    She continues to experience severe hot flashes even on effexor  She cannot take estrogen therapy  Pt denies pelvic pain, breast pain, breast lumps, changes in bowel or bladder habits, or vaginal bleeding      Review of Systems   Constitutional: Negative  HENT: Negative  Eyes: Negative  Respiratory: Negative  Cardiovascular: Negative  Gastrointestinal: Negative  Endocrine: Negative  Musculoskeletal: Negative  Skin: Negative  Allergic/Immunologic: Negative  Neurological: Negative  Hematological: Negative  Psychiatric/Behavioral: Negative  All other systems reviewed and are negative  Objective:      /80 (BP Location: Left arm, Patient Position: Sitting, Cuff Size: Standard)   Ht 5' 6 93" (1 7 m)   Wt 88 5 kg (195 lb)   BMI 30 61 kg/m²          Physical Exam   Constitutional: She is oriented to person, place, and time  She appears well-developed and well-nourished  HENT:   Head: Normocephalic and atraumatic  Neck: Normal range of motion  Neck supple  No tracheal deviation present  No thyromegaly present  Cardiovascular: Normal rate, regular rhythm and normal heart sounds  Pulmonary/Chest: Effort normal and breath sounds normal  No stridor  No respiratory distress  She has no wheezes  She has no rales  She exhibits no tenderness  Right breast exhibits no inverted nipple, no mass, no nipple discharge, no skin change and no tenderness  Left breast exhibits no inverted nipple, no mass, no nipple discharge, no skin change and no tenderness  No breast swelling, tenderness, discharge or bleeding   Breasts are symmetrical  Abdominal: Soft  Bowel sounds are normal  She exhibits no distension and no mass  There is no tenderness  There is no rebound and no guarding  No hernia  Hernia confirmed negative in the right inguinal area and confirmed negative in the left inguinal area  Genitourinary: Vagina normal and uterus normal  Rectal exam shows no external hemorrhoid, no internal hemorrhoid, no fissure and no mass  No breast swelling, tenderness, discharge or bleeding  No labial fusion  There is no rash, tenderness, lesion or injury on the right labia  There is no rash, tenderness, lesion or injury on the left labia  Uterus is not deviated, not enlarged, not fixed and not tender  Cervix exhibits no motion tenderness, no discharge and no friability  Right adnexum displays no mass, no tenderness and no fullness  Left adnexum displays no mass, no tenderness and no fullness  No erythema, tenderness or bleeding in the vagina  No foreign body in the vagina  No signs of injury around the vagina  No vaginal discharge found  Genitourinary Comments: Urethra normal   Lymphadenopathy: No inguinal adenopathy noted on the right or left side  Neurological: She is alert and oriented to person, place, and time  Skin: Skin is warm and dry  Psychiatric: She has a normal mood and affect   Her behavior is normal  Judgment and thought content normal

## 2019-11-21 NOTE — PROGRESS NOTES
The patient is here for a yearly  The patient is not due for a pap smear  No bleeding or cramping  The patient has a little bumps on the outside of the vagina near the labia  They do not itch and they are tender to touch  No vaginal burning or discharge  She noticed the bumps sometime this year  No urinary issues  No breast concerns

## 2019-12-14 DIAGNOSIS — N95.1 SYMPTOMATIC MENOPAUSAL OR FEMALE CLIMACTERIC STATES: ICD-10-CM

## 2019-12-16 RX ORDER — VENLAFAXINE HYDROCHLORIDE 37.5 MG/1
37.5 CAPSULE, EXTENDED RELEASE ORAL DAILY
Qty: 30 CAPSULE | Refills: 1 | Status: SHIPPED | OUTPATIENT
Start: 2019-12-16 | End: 2019-12-20

## 2019-12-20 ENCOUNTER — OFFICE VISIT (OUTPATIENT)
Dept: FAMILY MEDICINE CLINIC | Facility: CLINIC | Age: 64
End: 2019-12-20
Payer: COMMERCIAL

## 2019-12-20 VITALS
DIASTOLIC BLOOD PRESSURE: 82 MMHG | OXYGEN SATURATION: 98 % | HEART RATE: 71 BPM | TEMPERATURE: 97.4 F | WEIGHT: 198.4 LBS | SYSTOLIC BLOOD PRESSURE: 126 MMHG | RESPIRATION RATE: 16 BRPM | BODY MASS INDEX: 31.88 KG/M2 | HEIGHT: 66 IN

## 2019-12-20 DIAGNOSIS — N95.1 HOT FLASHES DUE TO MENOPAUSE: ICD-10-CM

## 2019-12-20 DIAGNOSIS — I10 BENIGN ESSENTIAL HYPERTENSION: Primary | ICD-10-CM

## 2019-12-20 DIAGNOSIS — E78.5 DYSLIPIDEMIA: ICD-10-CM

## 2019-12-20 DIAGNOSIS — N60.99 ATYPICAL LOBULAR HYPERPLASIA (ALH) OF BREAST: ICD-10-CM

## 2019-12-20 DIAGNOSIS — I10 HYPERTENSION, UNSPECIFIED TYPE: ICD-10-CM

## 2019-12-20 DIAGNOSIS — K21.9 GASTROESOPHAGEAL REFLUX DISEASE, ESOPHAGITIS PRESENCE NOT SPECIFIED: ICD-10-CM

## 2019-12-20 PROCEDURE — 99214 OFFICE O/P EST MOD 30 MIN: CPT | Performed by: FAMILY MEDICINE

## 2019-12-20 RX ORDER — TRIAMTERENE AND HYDROCHLOROTHIAZIDE 37.5; 25 MG/1; MG/1
1 CAPSULE ORAL EVERY MORNING
Qty: 90 CAPSULE | Refills: 2 | Status: SHIPPED | OUTPATIENT
Start: 2019-12-20 | End: 2020-11-20 | Stop reason: SDUPTHER

## 2019-12-20 RX ORDER — PAROXETINE 10 MG/1
10 TABLET, FILM COATED ORAL DAILY
Qty: 30 TABLET | Refills: 3 | Status: SHIPPED | OUTPATIENT
Start: 2019-12-20 | End: 2020-06-23 | Stop reason: ALTCHOICE

## 2019-12-20 RX ORDER — RABEPRAZOLE SODIUM 20 MG/1
20 TABLET, DELAYED RELEASE ORAL DAILY
Qty: 90 TABLET | Refills: 3 | Status: SHIPPED | OUTPATIENT
Start: 2019-12-20 | End: 2020-06-04 | Stop reason: SDUPTHER

## 2019-12-20 RX ORDER — METOPROLOL SUCCINATE 50 MG/1
50 TABLET, EXTENDED RELEASE ORAL DAILY
Qty: 90 TABLET | Refills: 3 | Status: SHIPPED | OUTPATIENT
Start: 2019-12-20 | End: 2020-12-21 | Stop reason: SDUPTHER

## 2019-12-20 NOTE — PROGRESS NOTES
FAMILY PRACTICE OFFICE VISIT       NAME: Alee Call  AGE: 60 y o  SEX: female       : 1955        MRN: [id number]        Assessment and Plan     Problem List Items Addressed This Visit        Digestive    Acid reflux disease     Continue AcipHex, symptoms are well controlled         Relevant Medications    RABEprazole (ACIPHEX) 20 MG tablet       Cardiovascular and Mediastinum    Benign essential hypertension - Primary     Blood pressure is well controlled on regimen of Dyazide and Toprol XL 50 mg daily         Relevant Medications    metoprolol succinate (TOPROL-XL) 50 mg 24 hr tablet    triamterene-hydrochlorothiazide (DYAZIDE) 37 5-25 mg per capsule    Other Relevant Orders    Comprehensive metabolic panel    TSH, 3rd generation    CBC and differential    Comprehensive metabolic panel    Lipid panel    CBC and differential       Other    Atypical lobular hyperplasia (ALH) of breast       Dr Moraes follows    Pending US 2020         Dyslipidemia    Relevant Orders    Lipid panel    Lipid panel    TSH, 3rd generation      Other Visit Diagnoses     Hot flashes due to menopause        Relevant Medications    PARoxetine (PAXIL) 10 mg tablet    Hypertension, unspecified type        Relevant Medications    metoprolol succinate (TOPROL-XL) 50 mg 24 hr tablet    triamterene-hydrochlorothiazide (DYAZIDE) 37 5-25 mg per capsule      Patient presents for follow-up of chronic medical conditions  She will continue on the same medication regimen for hypertension and acid reflux disease  She remains under ongoing care of [facility] for surveillance due to history of right breast atypical lobular hyperplasia, pending ultrasound in February  Patient is up-to-date with health screenings including colonoscopy and gyn exam   She has been suffering from very bothersome menopausal hot flashes  She did notice improvement on Effexor which unfortunately has cause side effect of nocturnal sweats    At this time I advised patient to discontinue Effexor by taking 37 5 mg every other day for 1 week and then switching medication to Paxil 10 mg daily  Patient will contact me with an update of her symptoms in 4-6 weeks  She will be proceeding with routine blood work and schedule routine follow-up in 6 months  Patient is up-to-date with flu vaccine  Patient uses zolpidem at night very infrequently, sparingly, as needed only  Labs and f/up  In  6 months  There are no Patient Instructions on file for this visit  Discussed with the patient and all questioned fully answered  She will call me if any problems arise  M*Modal software was used to dictate this note  It may contain errors with dictating incorrect words/spelling  Please contact provider directly with any questions  Chief Complaint     Chief Complaint   Patient presents with    Follow-up     Pt  here for a 6mo  F/U, Med  refills       History of Present Illness     Patient presents for follow-up  She has been feeling well aside from ongoing quite bothersome menopause hot flashes  Patient has tried Effexor ER 37 5 mg daily for hot flashes mid  Baby occasion actually worked well but has cause symptoms of nighttime sweats  Patient remains on metoprolol ER 50 mg once a day and Dyazide daily for treatment of hypertension  She denies symptoms of chest pain, palpitations, shortness of breath or dizziness  Patient was recently dxed with fibroma  Right foot-  Quite painful-  patient gave up on walking   Patient was seen Dr Estrada, Podiatry, no surgical intervention was recommended  Health screenings:   UTD with GYN: Dr Geeta ALBERTS with  Colonoscopy  6/2019   UTD  Flu vaccine 10/26/19    Patient remains on PPI therapy for chronic acid reflux disease, symptoms are well controlled    Right breast atypical lobular hyperplasia, patient remains under care of [facility] with pending breast ultrasound in February of 2020      Review of Systems   Review of Systems   Constitutional: Negative  HENT: Negative  Eyes: Negative  Respiratory: Negative  Cardiovascular: Negative  Gastrointestinal: Negative  Endocrine: Positive for heat intolerance  Genitourinary: Negative  Musculoskeletal: Positive for arthralgias  Skin: Negative  Allergic/Immunologic: Negative  Neurological: Negative  Hematological: Negative  Psychiatric/Behavioral: Positive for sleep disturbance         Active Problem List     Patient Active Problem List   Diagnosis    Acid reflux disease    Insomnia    History of colon polyps    Atypical lobular hyperplasia (ALH) of breast    Benign essential hypertension    Depression with anxiety    Dyslipidemia    Class 1 obesity due to excess calories without serious comorbidity with body mass index (BMI) of 31 0 to 31 9 in adult       Past Medical History:  Past Medical History:   Diagnosis Date    Arthritis     Chest pain     Colon polyps     GERD (gastroesophageal reflux disease)      2 para 2     Hypertension     Irritable bowel syndrome     Papanicolaou smear 11/15/2018    NEG    Post-menopausal     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        Past Surgical History:  Past Surgical History:   Procedure Laterality Date    BREAST BIOPSY Right 2018    CARPAL TUNNEL RELEASE Left     CTR    COLONOSCOPY  2013    repeat in 5 yrs,  Dr [facility] COLONOSCOPY  2019    ESOPHAGOGASTRIC FUNDOPLASTY      Nissen fundoplication    ESOPHAGOGASTRODUODENOSCOPY      FOOT SURGERY Left     plantar fascia     FUNDOPLICATION TRANSORAL      GYNECOLOGIC CRYOSURGERY      HERNIA REPAIR  2010    MAMMO (HISTORICAL) Bilateral 2018    check right    MAMMO NEEDLE LOCALIZATION RIGHT (ALL INC) Right 10/23/2018    MAMMO STEREOTACTIC BREAST BIOPSY RIGHT (ALL INC) Right 2018    POLYPECTOMY  2008    ulcerated polypectomy     MT PERQ DEVICE PLACEMT BREAST LOC 1ST LES W GUIDNCE Right 10/23/2018    Procedure: BREAST LUMPECTOMY; BREAST NEEDLE LOCATION (NEEDLE LOC AT 0830) ;   Surgeon: Jami Teran MD;  [facility];  Service: Surgical Oncology    TONSILLECTOMY AND ADENOIDECTOMY      TUBAL LIGATION      VAGINAL DELIVERY      1980, 1982       Family History:  Family History   Problem Relation Age of Onset    Diabetes Mother     Heart disease Mother     Osteoporosis Mother     Hypertension Mother     Heart disease Father     Skin cancer Father     Hyperlipidemia Father     Cancer Family     Breast cancer Paternal Aunt     No Known Problems Sister     No Known Problems Daughter     No Known Problems Maternal Aunt     No Known Problems Paternal Aunt     Breast cancer Maternal Grandmother     Cervical cancer Paternal Grandmother        Social History:  Social History     Socioeconomic History    Marital status: /Civil Union     Spouse name: Not on file    Number of children: 2    Years of education: Not on file    Highest education level: Not on file   Occupational History    Not on file   Social Needs    Financial resource strain: Not on file    Food insecurity:     Worry: Not on file     Inability: Not on file    Transportation needs:     Medical: Not on file     Non-medical: Not on file   Tobacco Use    Smoking status: Never Smoker    Smokeless tobacco: Never Used   Substance and Sexual Activity    Alcohol use: Yes     Frequency: 2-3 times a week     Drinks per session: 1 or 2     Comment: socially    Drug use: No    Sexual activity: Not Currently     Partners: Male     Birth control/protection: Post-menopausal   Lifestyle    Physical activity:     Days per week: Not on file     Minutes per session: Not on file    Stress: Not on file   Relationships    Social connections:     Talks on phone: Not on file     Gets together: Not on file     Attends Buddhist service: Not on file     Active member of club or organization: Not on file     Attends meetings of clubs or organizations: Not on file     Relationship status: Not on file    Intimate partner violence:     Fear of current or ex partner: Not on file     Emotionally abused: Not on file     Physically abused: Not on file     Forced sexual activity: Not on file   Other Topics Concern    Not on file   Social History Narrative    Not on file       Objective     Vitals:    12/20/19 1016 12/20/19 1049   BP: 140/88 126/82   Pulse: 71    Resp: 16    Temp: (!) 97 4 °F (36 3 °C)    TempSrc: Tympanic    SpO2: 98%    Weight: 90 kg (198 lb 6 4 oz)    Height: 5' 6" (1 676 m)      Wt Readings from Last 3 Encounters:   12/20/19 90 kg (198 lb 6 4 oz)   11/21/19 88 5 kg (195 lb)   08/30/19 88 5 kg (195 lb)       Physical Exam   Constitutional: She is oriented to person, place, and time  She appears well-developed and well-nourished  HENT:   Head: Normocephalic and atraumatic  Eyes: Conjunctivae are normal    Neck: Neck supple  Carotid bruit is not present  No thyromegaly present  Cardiovascular: Normal rate, regular rhythm and normal heart sounds  No murmur heard  Pulmonary/Chest: Effort normal and breath sounds normal  No respiratory distress  She has no wheezes  Abdominal: She exhibits no abdominal bruit  Musculoskeletal: Normal range of motion  She exhibits no edema  Neurological: She is alert and oriented to person, place, and time  No cranial nerve deficit  Coordination normal    Psychiatric: She has a normal mood and affect  Her behavior is normal    Nursing note and vitals reviewed        Pertinent Laboratory/Diagnostic Studies:  Lab Results   Component Value Date    GLUCOSE 95 06/10/2015    BUN 17 05/10/2016    CREATININE 0 75 05/10/2016    CALCIUM 9 9 05/10/2016     05/10/2016    K 4 5 05/10/2016    CO2 26 05/10/2016     05/10/2016     Lab Results   Component Value Date    ALT 24 05/10/2016    AST 19 05/10/2016    ALKPHOS 50 05/10/2016    BILITOT 0 5 05/10/2016       Lab Results Component Value Date    WBC 6 1 05/10/2016    HGB 14 4 05/10/2016    HCT 44 1 05/10/2016    MCV 95 4 05/10/2016     05/10/2016       No results found for: TSH    Lab Results   Component Value Date    CHOL 215 (H) 05/10/2016     Lab Results   Component Value Date    TRIG 89 05/10/2016     Lab Results   Component Value Date    HDL 58 05/10/2016     Lab Results   Component Value Date    LDLCALC 111 (H) 06/10/2015     No results found for: HGBA1C    Results for orders placed or performed in visit on 11/15/18   HPV High Risk   Result Value Ref Range    HPV Other HR Negative Negative    HPV16 Negative Negative    HPV18 Negative Negative   Liquid-based pap, screening   Result Value Ref Range    Case Report       Gynecologic Cytology Report                       Case: [id number]                                  Authorizing Provider:  Kathi Christianson MD            Collected:           11/15/2018 1018              First Screen:          VANITA Anthony  Received:            11/16/2018 1018              Specimen:    LIQUID-BASED PAP, SCREENING, Endocervical                                                  Primary Interpretation Negative for intraepithelial lesion or malignancy     Specimen Adequacy Satisfactory for evaluation  (See note)     Note       No endocervical cells identified  It is difficult to differentiate between squamous metaplastic cells and parabasal cells in atrophic specimens due to numerous causes including menopause, postpartum changes and progestational agents  Additional Information       [employer]'s FDA approved ,  and ThinPrep Imaging System are utilized with strict adherence to the 's instruction manual to prepare gynecologic and non-gynecologic cytology specimens for the production of ThinPrep slides as well as for gynecologic ThinPrep imaging  These processes have been validated by our laboratory and/or by the     The Pap test is not a diagnostic procedure and should not be used as the sole means to detect cervical cancer  It is only a screening procedure to aid in the detection of cervical cancer and its precursors  Both false-negative and false-positive results have been experienced  Your patient's test result should be interpreted in this context together with the history and clinical findings          LMP         Orders Placed This Encounter   Procedures    Comprehensive metabolic panel    Lipid panel    TSH, 3rd generation    CBC and differential    Comprehensive metabolic panel    Lipid panel    TSH, 3rd generation    CBC and differential       ALLERGIES:  Allergies   Allergen Reactions    Ace Inhibitors Cough    Adhesive [Medical Tape] Itching       Current Medications     Current Outpatient Medications   Medication Sig Dispense Refill    Calcium-Magnesium-Vitamin D (CITRACAL CALCIUM+D PO) Take by mouth      Cholecalciferol (VITAMIN D) 2000 units CAPS Take 1 tablet by mouth daily      docusate sodium ([employer] STOOL SOFTENER) 100 mg capsule Take 1 capsule by mouth daily      Flaxseed, Linseed, (FLAXSEED OIL PO) Take 1,400 mg by mouth daily 1400 mg daily       fluticasone (FLONASE) 50 mcg/act nasal spray 2 sprays into each nostril daily      loratadine (CLARITIN) 10 mg tablet Take 10 mg by mouth daily      Magnesium 250 MG TABS Take 250 mg by mouth 2 (two) times a day      metoprolol succinate (TOPROL-XL) 50 mg 24 hr tablet Take 1 tablet (50 mg total) by mouth daily 90 tablet 3    Multiple Vitamins-Minerals (WOMENS 50+ MULTI VITAMIN/MIN PO) Take by mouth      RABEprazole (ACIPHEX) 20 MG tablet Take 1 tablet (20 mg total) by mouth daily 90 tablet 3    triamterene-hydrochlorothiazide (DYAZIDE) 37 5-25 mg per capsule Take 1 capsule by mouth every morning 90 capsule 2    zolpidem (AMBIEN) 10 mg tablet Take 1 tablet (10 mg total) by mouth as needed (as needed) 30 tablet 1    PARoxetine (PAXIL) 10 mg tablet Take 1 tablet (10 mg total) by mouth daily 30 tablet 3     No current facility-administered medications for this visit          Medications Discontinued During This Encounter   Medication Reason    venlafaxine (EFFEXOR-XR) 37 5 mg 24 hr capsule     metoprolol succinate (TOPROL-XL) 25 mg 24 hr tablet Reorder    triamterene-hydrochlorothiazide (DYAZIDE) 37 5-25 mg per capsule Reorder    RABEprazole (ACIPHEX) 20 MG tablet Reorder       Health Maintenance     Health Maintenance   Topic Date Due    Hepatitis C Screening  1955    PT PLAN OF CARE  1955    HIV Screening  11/27/1972    DTaP,Tdap,and Td Vaccines (1 - Tdap) 05/30/2020 (Originally 11/27/1968)    BMI: Followup Plan  06/04/2020    BMI: Adult  12/20/2020    MAMMOGRAM  08/30/2021    CRC Screening: Colonoscopy  06/12/2022    Pneumococcal Vaccine: 65+ Years (1 of 2 - PCV13) 11/27/2022    Cervical Cancer Screening  11/15/2023    Influenza Vaccine  Completed    Pneumococcal Vaccine: Pediatrics (0 to 5 Years) and At-Risk Patients (6 to 59 Years)  Aged Out    HIB Vaccine  Aged Out    Hepatitis B Vaccine  Aged Out    IPV Vaccine  Aged Out    Hepatitis A Vaccine  Aged Out    Meningococcal ACWY Vaccine  Aged Out    HPV Vaccine  Aged [employer] History   Administered Date(s) Administered    Influenza Quadrivalent Preservative Free 3 years and older IM 11/14/2016, 09/16/2017    Influenza TIV (IM) 10/09/2013, 09/15/2014, 10/02/2015    Influenza, injectable, quadrivalent, preservative free 0 5 mL 10/26/2019    Influenza, recombinant, quadrivalent,injectable, preservative free 09/06/2018       Jane Frost MD

## 2020-01-31 ENCOUNTER — TELEPHONE (OUTPATIENT)
Dept: SURGICAL ONCOLOGY | Facility: CLINIC | Age: 65
End: 2020-01-31

## 2020-01-31 DIAGNOSIS — Z12.39 ENCOUNTER FOR BREAST CANCER SCREENING OTHER THAN MAMMOGRAM: Primary | ICD-10-CM

## 2020-01-31 NOTE — TELEPHONE ENCOUNTER
Patient called the office with concerns regarding the insurance coverage of her ABUS  New order placed with a different diagnosis code per patient request  Called central scheduling and ensured the new order was associated with her appointment  Patient was appreciative and denies any additional questions or concerns at this time

## 2020-02-04 ENCOUNTER — TRANSCRIBE ORDERS (OUTPATIENT)
Dept: MAMMOGRAPHY | Facility: CLINIC | Age: 65
End: 2020-02-04

## 2020-02-04 ENCOUNTER — HOSPITAL ENCOUNTER (OUTPATIENT)
Dept: ULTRASOUND IMAGING | Facility: CLINIC | Age: 65
Discharge: HOME | End: 2020-02-04
Payer: COMMERCIAL

## 2020-02-04 VITALS — HEIGHT: 66 IN | WEIGHT: 198 LBS | BODY MASS INDEX: 31.82 KG/M2

## 2020-02-04 DIAGNOSIS — Z12.39 ENCOUNTER FOR BREAST CANCER SCREENING OTHER THAN MAMMOGRAM: ICD-10-CM

## 2020-02-04 PROCEDURE — 76641 ULTRASOUND BREAST COMPLETE: CPT

## 2020-02-04 PROCEDURE — 76377 3D RENDER W/INTRP POSTPROCES: CPT

## 2020-04-01 ENCOUNTER — TELEPHONE (OUTPATIENT)
Dept: SURGICAL ONCOLOGY | Facility: CLINIC | Age: 65
End: 2020-04-01

## 2020-04-10 ENCOUNTER — TELEPHONE (OUTPATIENT)
Dept: OTHER | Facility: OTHER | Age: 65
End: 2020-04-10

## 2020-04-14 ENCOUNTER — TELEPHONE (OUTPATIENT)
Dept: SURGICAL ONCOLOGY | Facility: CLINIC | Age: 65
End: 2020-04-14

## 2020-06-04 DIAGNOSIS — K21.9 GASTROESOPHAGEAL REFLUX DISEASE, ESOPHAGITIS PRESENCE NOT SPECIFIED: ICD-10-CM

## 2020-06-04 RX ORDER — RABEPRAZOLE SODIUM 20 MG/1
20 TABLET, DELAYED RELEASE ORAL DAILY
Qty: 90 TABLET | Refills: 1 | Status: SHIPPED | OUTPATIENT
Start: 2020-06-04 | End: 2020-09-12 | Stop reason: SDUPTHER

## 2020-06-24 ENCOUNTER — OFFICE VISIT (OUTPATIENT)
Dept: FAMILY MEDICINE CLINIC | Facility: CLINIC | Age: 65
End: 2020-06-24
Payer: COMMERCIAL

## 2020-06-24 VITALS
TEMPERATURE: 98.3 F | DIASTOLIC BLOOD PRESSURE: 80 MMHG | BODY MASS INDEX: 31.82 KG/M2 | OXYGEN SATURATION: 96 % | RESPIRATION RATE: 16 BRPM | WEIGHT: 198 LBS | HEART RATE: 75 BPM | HEIGHT: 66 IN | SYSTOLIC BLOOD PRESSURE: 126 MMHG

## 2020-06-24 DIAGNOSIS — M54.41 CHRONIC MIDLINE LOW BACK PAIN WITH RIGHT-SIDED SCIATICA: ICD-10-CM

## 2020-06-24 DIAGNOSIS — N60.99 ATYPICAL LOBULAR HYPERPLASIA (ALH) OF BREAST: ICD-10-CM

## 2020-06-24 DIAGNOSIS — E78.5 DYSLIPIDEMIA: ICD-10-CM

## 2020-06-24 DIAGNOSIS — K21.9 GASTROESOPHAGEAL REFLUX DISEASE, ESOPHAGITIS PRESENCE NOT SPECIFIED: ICD-10-CM

## 2020-06-24 DIAGNOSIS — G47.00 INSOMNIA, UNSPECIFIED TYPE: ICD-10-CM

## 2020-06-24 DIAGNOSIS — G89.29 CHRONIC MIDLINE LOW BACK PAIN WITH RIGHT-SIDED SCIATICA: ICD-10-CM

## 2020-06-24 DIAGNOSIS — I10 BENIGN ESSENTIAL HYPERTENSION: Primary | ICD-10-CM

## 2020-06-24 PROCEDURE — 3074F SYST BP LT 130 MM HG: CPT | Performed by: FAMILY MEDICINE

## 2020-06-24 PROCEDURE — 99214 OFFICE O/P EST MOD 30 MIN: CPT | Performed by: FAMILY MEDICINE

## 2020-06-24 PROCEDURE — 1036F TOBACCO NON-USER: CPT | Performed by: FAMILY MEDICINE

## 2020-06-24 PROCEDURE — 3079F DIAST BP 80-89 MM HG: CPT | Performed by: FAMILY MEDICINE

## 2020-06-24 PROCEDURE — 3008F BODY MASS INDEX DOCD: CPT | Performed by: FAMILY MEDICINE

## 2020-06-28 PROBLEM — M54.41 CHRONIC MIDLINE LOW BACK PAIN WITH RIGHT-SIDED SCIATICA: Status: ACTIVE | Noted: 2020-06-28

## 2020-06-28 PROBLEM — G89.29 CHRONIC MIDLINE LOW BACK PAIN WITH RIGHT-SIDED SCIATICA: Status: ACTIVE | Noted: 2020-06-28

## 2020-07-02 ENCOUNTER — DOCUMENTATION (OUTPATIENT)
Dept: FAMILY MEDICINE CLINIC | Facility: CLINIC | Age: 65
End: 2020-07-02

## 2020-07-02 NOTE — PROGRESS NOTES
[address] [place] [address]  The following is per review of patient's pertinent medical/medication history with regard to start of gabapentin for sciatic pain:     Patient's insurance coverage:  Payor: BLUE CROSS / Plan: [employer] PLAN 361 / Product Type: Blue Fee for Service /        Findings:     · CBD oil can act as a CNS depressant  · Gabapentin, Zolpidem, and CBD Oil taken concurrently increase the risk for respiratory depression, most typically in patients with at least one lung risk factor  · Patient has no history of smoking and no chronic lung conditions     Recommendations:   · Agree with holding Zolpidem while initiating gabapentin  · Recommend holding CBD oil as well when initiating gabapentin  · If significant symptom recurrence can consider resuming at low dose with monitoring for respiratory depression    Demographics  Interaction Method: Virtual  Type of Intervention: New    Topic(s) Addressed  Other    Intervention(s) Made    Pharmacologic:      Medication Discontinuation    Prevent or Manage Adverse Drug Reaction    Drug Interaction    Tool(s) Used  Not Applicable    Time Spent:   Time Spent in Direct Patient Care: 0 minutes    Time Spent in Care Coordination: 15 minutes    Recommendation(s) Accepted by the Patient/Caregiver:  All Accepted

## 2020-08-04 ENCOUNTER — TELEPHONE (OUTPATIENT)
Dept: FAMILY MEDICINE CLINIC | Facility: CLINIC | Age: 65
End: 2020-08-04

## 2020-08-04 NOTE — TELEPHONE ENCOUNTER
I spoke with patient and discussed my consult with clinical pharmacist     If patient were to start gabapentin for symptoms of back pain and right sciatica she will have to discontinue CBD  Patient also cannot combine gabapentin with Ambien  Patient states that overall sciatica symptoms have improved  She remains under care of chiropractor, Dr Kami Steele  She takes 1 Aleve on average once a week and it works very well for her pain  Patient would like to hold off start of gabapentin right now since she had was able to normalize her sleep pattern with CBD oils   If back pain will persist-MRI is likely the next diagnostic step  If patient changes her mind and would like to try gabapentin-she will contact me  Patient understands instructions and agrees

## 2020-09-08 ENCOUNTER — HOSPITAL ENCOUNTER (OUTPATIENT)
Dept: MAMMOGRAPHY | Facility: CLINIC | Age: 65
Discharge: HOME | End: 2020-09-08
Payer: COMMERCIAL

## 2020-09-08 VITALS — BODY MASS INDEX: 30.86 KG/M2 | HEIGHT: 66 IN | WEIGHT: 192 LBS | TEMPERATURE: 97.5 F

## 2020-09-08 DIAGNOSIS — Z12.31 ENCOUNTER FOR SCREENING MAMMOGRAM FOR BREAST CANCER: ICD-10-CM

## 2020-09-08 PROCEDURE — 77067 SCR MAMMO BI INCL CAD: CPT

## 2020-09-08 PROCEDURE — 77063 BREAST TOMOSYNTHESIS BI: CPT

## 2020-09-09 ENCOUNTER — OFFICE VISIT (OUTPATIENT)
Dept: SURGICAL ONCOLOGY | Facility: CLINIC | Age: 65
End: 2020-09-09
Payer: COMMERCIAL

## 2020-09-09 VITALS
TEMPERATURE: 97.5 F | HEIGHT: 66 IN | WEIGHT: 193.5 LBS | HEART RATE: 59 BPM | DIASTOLIC BLOOD PRESSURE: 82 MMHG | BODY MASS INDEX: 31.1 KG/M2 | SYSTOLIC BLOOD PRESSURE: 122 MMHG

## 2020-09-09 DIAGNOSIS — Z12.31 VISIT FOR SCREENING MAMMOGRAM: ICD-10-CM

## 2020-09-09 DIAGNOSIS — N60.99 ATYPICAL LOBULAR HYPERPLASIA (ALH) OF BREAST: Primary | ICD-10-CM

## 2020-09-09 PROCEDURE — 3079F DIAST BP 80-89 MM HG: CPT | Performed by: NURSE PRACTITIONER

## 2020-09-09 PROCEDURE — 1036F TOBACCO NON-USER: CPT | Performed by: NURSE PRACTITIONER

## 2020-09-09 PROCEDURE — 99213 OFFICE O/P EST LOW 20 MIN: CPT | Performed by: NURSE PRACTITIONER

## 2020-09-09 NOTE — PROGRESS NOTES
Surgical Oncology Follow Up       [address]  [facility]  [address]  [place] [address]    [facility]  1955  [id number]      Chief Complaint   Patient presents with    Follow-up       Assessment/Plan:  1  Atypical lobular hyperplasia [facility]) of breast  - f/u in May  - CBE with GYN in November    2  Visit for screening mammogram  - Mammo screening bilateral w 3d & cad; Future    Discussion/Summary:  Patient is a 62-year-old female who presents today for a follow-up visit for right breast LCIS and ALH  She underwent surgical excision by Dr Charlie Ellington recommended annual mammograms as well as annual breast MRIs  However, the patient is concerned about the false-positive biopsy rate as well as the financial toxicity of MRIs and therefore elected to undergo automated breast ultrasounds instead  She did meet with Dr Loco Killian and elected not to have chemoprevention  She had an ABUS in February of 2020 which was BI-RADS 1  She had a bilateral 3D screening mammogram yesterday which was BI-RADS 2, category 1 density  She has no new complaints today and there are no concerns on today's exam   She is declining adjuvant screening to her mammogram   I did review that this would be my recommendation as she is considered high risk for developing a breast cancer  I have recommended her to have either an ABUS or MRI in February staggered with her mammogram however the patient states that she will only schedule the imaging once her current insurance last payment issue is resolved  I did instruct her to call me if she is agreeable to imaging  She will be seeing her gynecologist in November and will receive a clinical breast exam at that time  I will therefore plan to see her back in May and then annually thereafter  I encouraged her to contact me with any changes on self-breast exam   She is in agreement with this plan    All of her questions were answered today  History of Present Illness:     -Interval History:  Patient notices no changes on her self breast exam   She had a mammogram performed yesterday which was BI-RADS 2  She is declining any further adjuvant screening as she has had insurance issues covering the ABUS which she had done earlier this year  Review of Systems:  Review of Systems   Constitutional: Negative for activity change, appetite change, chills, fatigue, fever and unexpected weight change  HENT: Negative for trouble swallowing  Eyes: Negative for pain, redness and visual disturbance  Respiratory: Negative for cough, shortness of breath and wheezing  Cardiovascular: Negative for chest pain, palpitations and leg swelling  Gastrointestinal: Negative for abdominal pain, constipation, diarrhea, nausea and vomiting  Endocrine: Negative for cold intolerance and heat intolerance  Musculoskeletal: Negative for arthralgias, back pain, gait problem and myalgias  Skin: Negative for color change and rash  Neurological: Negative for dizziness, syncope, light-headedness, numbness and headaches  Hematological: Negative for adenopathy  Psychiatric/Behavioral: Negative for agitation and confusion  All other systems reviewed and are negative        Patient Active Problem List   Diagnosis    Acid reflux disease    Insomnia    History of colon polyps    Atypical lobular hyperplasia (ALH) of breast    Benign essential hypertension    Depression with anxiety    Dyslipidemia    Class 1 obesity due to excess calories without serious comorbidity with body mass index (BMI) of 31 0 to 31 9 in adult    Chronic midline low back pain with right-sided sciatica     Past Medical History:   Diagnosis Date    Arthritis     Chest pain     Colon polyps     GERD (gastroesophageal reflux disease)      2 para 2     Hypertension     Irritable bowel syndrome     Papanicolaou smear 11/15/2018    NEG    Post-menopausal     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      Past Surgical History:   Procedure Laterality Date    BREAST BIOPSY Right 08/29/2018    BREAST LUMPECTOMY Right 10/2018    lobular carcinoma in situ    CARPAL TUNNEL RELEASE Left     CTR    COLONOSCOPY  03/2013    repeat in 5 yrs,  Dr Esparza Shoulders COLONOSCOPY  06/12/2019    ESOPHAGOGASTRIC FUNDOPLASTY      Nissen fundoplication    ESOPHAGOGASTRODUODENOSCOPY      FOOT SURGERY Left     plantar fascia     FUNDOPLICATION TRANSORAL      GYNECOLOGIC CRYOSURGERY  1995    HERNIA REPAIR  2010    MAMMO (HISTORICAL) Bilateral 08/28/2018    check right    MAMMO NEEDLE LOCALIZATION RIGHT (ALL INC) Right 10/23/2018    MAMMO STEREOTACTIC BREAST BIOPSY RIGHT (ALL INC) Right 8/29/2018    POLYPECTOMY  2008    ulcerated polypectomy     UT PERQ DEVICE PLACEMT BREAST LOC 1ST LES W GUIDNCE Right 10/23/2018    Procedure: BREAST LUMPECTOMY; BREAST NEEDLE LOCATION (NEEDLE LOC AT 0830) ;   Surgeon: Trevin Wiley MD;  [facility];  Service: Surgical Oncology    TONSILLECTOMY AND ADENOIDECTOMY      TUBAL LIGATION      VAGINAL DELIVERY      1980, 1982     Family History   Problem Relation Age of Onset    Diabetes Mother     Heart disease Mother     Osteoporosis Mother     Hypertension Mother     Heart disease Father     Skin cancer Father     Hyperlipidemia Father     Cancer Family     Breast cancer Paternal Aunt     No Known Problems Sister     No Known Problems Daughter     No Known Problems Maternal Aunt     No Known Problems Paternal Aunt     Breast cancer Maternal Grandmother     Cervical cancer Paternal Grandmother      Social History     Socioeconomic History    Marital status: /Civil Union     Spouse name: Not on file    Number of children: 2    Years of education: Not on file    Highest education level: Not on file   Occupational History    Not on file   Social Needs    Financial resource strain: Not on file   [employer] insecurity     Worry: Not on file     Inability: Not on file    Transportation needs     Medical: Not on file     Non-medical: Not on file   Tobacco Use    Smoking status: Never Smoker    Smokeless tobacco: Never Used   Substance and Sexual Activity    Alcohol use: Yes     Frequency: 2-3 times a week     Drinks per session: 1 or 2     Comment: socially    Drug use: No    Sexual activity: Not Currently     Partners: Male     Birth control/protection: Post-menopausal   Lifestyle    Physical activity     Days per week: Not on file     Minutes per session: Not on file    Stress: Not on file   Relationships    Social connections     Talks on phone: Not on file     Gets together: Not on file     Attends Hinduism service: Not on file     Active member of club or organization: Not on file     Attends meetings of clubs or organizations: Not on file     Relationship status: Not on file    Intimate partner violence     Fear of current or ex partner: Not on file     Emotionally abused: Not on file     Physically abused: Not on file     Forced sexual activity: Not on file   Other Topics Concern    Not on file   Social History Narrative    Not on file       Current Outpatient Medications:     Calcium-Magnesium-Vitamin D (CITRACAL CALCIUM+D PO), Take by mouth, Disp: , Rfl:     Cholecalciferol (VITAMIN D) 2000 units CAPS, Take 1 tablet by mouth daily, Disp: , Rfl:     Flaxseed, Linseed, (FLAXSEED OIL PO), Take 1,400 mg by mouth daily 1400 mg daily , Disp: , Rfl:     fluticasone (FLONASE) 50 mcg/act nasal spray, 2 sprays into each nostril daily, Disp: , Rfl:     loratadine (CLARITIN) 10 mg tablet, Take 10 mg by mouth daily, Disp: , Rfl:     Magnesium 250 MG TABS, Take 250 mg by mouth 2 (two) times a day, Disp: , Rfl:     metoprolol succinate (TOPROL-XL) 50 mg 24 hr tablet, Take 1 tablet (50 mg total) by mouth daily, Disp: 90 tablet, Rfl: 3    Multiple Vitamins-Minerals (WOMENS 50+ MULTI VITAMIN/MIN PO), Take by mouth, Disp: , Rfl:     RABEprazole (ACIPHEX) 20 MG tablet, Take 1 tablet (20 mg total) by mouth daily, Disp: 90 tablet, Rfl: 1    triamterene-hydrochlorothiazide (DYAZIDE) 37 5-25 mg per capsule, Take 1 capsule by mouth every morning, Disp: 90 capsule, Rfl: 2    zolpidem (AMBIEN) 10 mg tablet, Take 1 tablet (10 mg total) by mouth as needed (as needed), Disp: 30 tablet, Rfl: 1    docusate sodium (CVS STOOL SOFTENER) 100 mg capsule, Take 1 capsule by mouth daily, Disp: , Rfl:   Allergies   Allergen Reactions    Ace Inhibitors Cough    Adhesive [Medical Tape] Itching     Vitals:    09/09/20 1131   BP: 122/82   Pulse: 59   Temp: 97 5 °F (36 4 °C)       Physical Exam  Constitutional:       Appearance: Normal appearance  HENT:      Head: Normocephalic and atraumatic  Pulmonary:      Effort: Pulmonary effort is normal    Chest:      Breasts:         Right: Skin change (right breast surgical scar present) present  No swelling, bleeding, inverted nipple, mass, nipple discharge or tenderness  Left: No swelling, bleeding, inverted nipple, mass, nipple discharge, skin change or tenderness  Lymphadenopathy:      Upper Body:      Right upper body: No supraclavicular or axillary adenopathy  Left upper body: No supraclavicular or axillary adenopathy  Neurological:      General: No focal deficit present  Mental Status: She is alert and oriented to person, place, and time  Psychiatric:         Mood and Affect: Mood normal        Results:    Imaging  Mammo Screening Bilateral W 3d & Cad    Result Date: 9/9/2020  Narrative: DIAGNOSIS: Encounter for screening mammogram for breast cancer TECHNIQUE: Digital screening mammography was performed  Computer Aided Detection (CAD) analyzed all applicable images   COMPARISONS: Prior breast imaging dated: 08/23/2017, 08/16/2017, 08/10/2016, 08/05/2015, and 07/30/2014 RELEVANT HISTORY: Family Breast Cancer History: History of breast cancer in Paternal Aunt, Maternal Grandmother  Family Medical History: Family medical history includes breast cancer in maternal grandmother and breast cancer in paternal aunt  Personal History: No known relevant hormone history  Surgical history includes breast biopsy and lumpectomy  No known relevant medical history  The patient is scheduled in a reminder system for screening mammography  8-10% of cancers will be missed on mammography  Management of a palpable abnormality must be based on clinical grounds  Patients will be notified of their results via letter from our facility  Accredited by [employer] of [employer] and FDA  RISK ASSESSMENT: 5 Year Tyrer-Cuzick: 2 14 % 10 Year Tyrer-Cuzick: 4 11 % Lifetime Tyrer-Cuzick: 9 46 % TISSUE DENSITY: The breasts are almost entirely fatty  INDICATION: Lauri Oakes is a 60 y o  female presenting for screening mammography  FINDINGS: Bilateral There are no suspicious masses, grouped microcalcifications or areas of architectural distortion  The skin and nipple areolar complex are unremarkable  Impression: No mammographic evidence of malignancy  ASSESSMENT/BI-RADS CATEGORY: Left: 2 - Benign Right: 2 - Benign Overall: 2 - Benign RECOMMENDATION:      - Routine screening mammogram in 1 year for both breasts  Workstation ID: [id number]       I reviewed the above imaging data  Advance Care Planning/Advance Directives:  Discussed disease status and treatment goals with the patient

## 2020-09-10 PROBLEM — Z91.89 INCREASED RISK OF BREAST CANCER: Status: ACTIVE | Noted: 2020-09-10

## 2020-09-10 PROBLEM — D05.00 BREAST NEOPLASM, TIS (LCIS): Status: ACTIVE | Noted: 2020-09-10

## 2020-09-12 DIAGNOSIS — K21.9 GASTROESOPHAGEAL REFLUX DISEASE, ESOPHAGITIS PRESENCE NOT SPECIFIED: ICD-10-CM

## 2020-09-14 RX ORDER — RABEPRAZOLE SODIUM 20 MG/1
20 TABLET, DELAYED RELEASE ORAL DAILY
Qty: 90 TABLET | Refills: 1 | Status: SHIPPED | OUTPATIENT
Start: 2020-09-14 | End: 2020-12-21 | Stop reason: SDUPTHER

## 2020-09-15 DIAGNOSIS — K21.9 GASTROESOPHAGEAL REFLUX DISEASE, ESOPHAGITIS PRESENCE NOT SPECIFIED: ICD-10-CM

## 2020-09-15 RX ORDER — RABEPRAZOLE SODIUM 20 MG/1
TABLET, DELAYED RELEASE ORAL
Qty: 90 TABLET | Refills: 1 | OUTPATIENT
Start: 2020-09-15

## 2020-11-18 ENCOUNTER — ANNUAL EXAM (OUTPATIENT)
Dept: OBGYN CLINIC | Facility: CLINIC | Age: 65
End: 2020-11-18
Payer: COMMERCIAL

## 2020-11-18 VITALS
SYSTOLIC BLOOD PRESSURE: 120 MMHG | HEIGHT: 66 IN | BODY MASS INDEX: 29.57 KG/M2 | DIASTOLIC BLOOD PRESSURE: 80 MMHG | WEIGHT: 184 LBS

## 2020-11-18 DIAGNOSIS — Z01.419 ENCOUNTER FOR ANNUAL ROUTINE GYNECOLOGICAL EXAMINATION: ICD-10-CM

## 2020-11-18 DIAGNOSIS — Z12.31 ENCOUNTER FOR SCREENING MAMMOGRAM FOR BREAST CANCER: Primary | ICD-10-CM

## 2020-11-18 PROCEDURE — S0612 ANNUAL GYNECOLOGICAL EXAMINA: HCPCS | Performed by: OBSTETRICS & GYNECOLOGY

## 2020-11-20 DIAGNOSIS — I10 BENIGN ESSENTIAL HYPERTENSION: ICD-10-CM

## 2020-11-20 RX ORDER — TRIAMTERENE AND HYDROCHLOROTHIAZIDE 37.5; 25 MG/1; MG/1
1 CAPSULE ORAL EVERY MORNING
Qty: 90 CAPSULE | Refills: 3 | Status: SHIPPED | OUTPATIENT
Start: 2020-11-20 | End: 2020-12-21 | Stop reason: SDUPTHER

## 2020-12-02 ENCOUNTER — VBI (OUTPATIENT)
Dept: ADMINISTRATIVE | Facility: OTHER | Age: 65
End: 2020-12-02

## 2020-12-21 ENCOUNTER — OFFICE VISIT (OUTPATIENT)
Dept: FAMILY MEDICINE CLINIC | Facility: CLINIC | Age: 65
End: 2020-12-21
Payer: COMMERCIAL

## 2020-12-21 VITALS
RESPIRATION RATE: 16 BRPM | HEIGHT: 66 IN | SYSTOLIC BLOOD PRESSURE: 136 MMHG | WEIGHT: 192.2 LBS | HEART RATE: 70 BPM | TEMPERATURE: 98.2 F | OXYGEN SATURATION: 99 % | BODY MASS INDEX: 30.89 KG/M2 | DIASTOLIC BLOOD PRESSURE: 80 MMHG

## 2020-12-21 DIAGNOSIS — I10 HYPERTENSION, UNSPECIFIED TYPE: ICD-10-CM

## 2020-12-21 DIAGNOSIS — K21.9 GASTROESOPHAGEAL REFLUX DISEASE: ICD-10-CM

## 2020-12-21 DIAGNOSIS — M77.8 LEFT SHOULDER TENDONITIS: Primary | ICD-10-CM

## 2020-12-21 DIAGNOSIS — E78.5 DYSLIPIDEMIA: ICD-10-CM

## 2020-12-21 DIAGNOSIS — I10 BENIGN ESSENTIAL HYPERTENSION: ICD-10-CM

## 2020-12-21 PROCEDURE — 99214 OFFICE O/P EST MOD 30 MIN: CPT | Performed by: FAMILY MEDICINE

## 2020-12-21 PROCEDURE — 3075F SYST BP GE 130 - 139MM HG: CPT | Performed by: FAMILY MEDICINE

## 2020-12-21 PROCEDURE — 1036F TOBACCO NON-USER: CPT | Performed by: FAMILY MEDICINE

## 2020-12-21 PROCEDURE — 3008F BODY MASS INDEX DOCD: CPT | Performed by: FAMILY MEDICINE

## 2020-12-21 PROCEDURE — 3079F DIAST BP 80-89 MM HG: CPT | Performed by: FAMILY MEDICINE

## 2020-12-21 RX ORDER — TRIAMTERENE AND HYDROCHLOROTHIAZIDE 37.5; 25 MG/1; MG/1
1 CAPSULE ORAL EVERY MORNING
Qty: 90 CAPSULE | Refills: 3 | Status: SHIPPED | OUTPATIENT
Start: 2020-12-21 | End: 2021-11-01 | Stop reason: SDUPTHER

## 2020-12-21 RX ORDER — METHYLPREDNISOLONE 4 MG/1
TABLET ORAL
Qty: 21 EACH | Refills: 0 | Status: SHIPPED | OUTPATIENT
Start: 2020-12-21 | End: 2021-05-11

## 2020-12-21 RX ORDER — RABEPRAZOLE SODIUM 20 MG/1
20 TABLET, DELAYED RELEASE ORAL DAILY
Qty: 90 TABLET | Refills: 3 | Status: SHIPPED | OUTPATIENT
Start: 2020-12-21 | End: 2021-12-20 | Stop reason: SDUPTHER

## 2020-12-21 RX ORDER — METOPROLOL SUCCINATE 50 MG/1
50 TABLET, EXTENDED RELEASE ORAL DAILY
Qty: 90 TABLET | Refills: 3 | Status: SHIPPED | OUTPATIENT
Start: 2020-12-21 | End: 2021-03-29

## 2021-03-29 DIAGNOSIS — I10 HYPERTENSION, UNSPECIFIED TYPE: ICD-10-CM

## 2021-03-29 RX ORDER — METOPROLOL SUCCINATE 50 MG/1
TABLET, EXTENDED RELEASE ORAL
Qty: 90 TABLET | Refills: 2 | Status: SHIPPED | OUTPATIENT
Start: 2021-03-29 | End: 2021-12-20 | Stop reason: SDUPTHER

## 2021-05-10 ENCOUNTER — TELEPHONE (OUTPATIENT)
Dept: FAMILY MEDICINE CLINIC | Facility: CLINIC | Age: 66
End: 2021-05-10

## 2021-05-10 NOTE — TELEPHONE ENCOUNTER
----- Message from [employer] sent at 5/10/2021  1:40 PM EDT -----  Regarding: Non-Urgent Medical Question  Contact: [phone]  [facility]  I would like to go have my bloodwork done this month for my up coming Dr  visit with you next month  The script I have is from last June of 2020  Could you please send or email me a new script so that I can get this done     Thank you,  Irais Peter

## 2021-05-11 ENCOUNTER — OFFICE VISIT (OUTPATIENT)
Dept: SURGICAL ONCOLOGY | Facility: CLINIC | Age: 66
End: 2021-05-11
Payer: COMMERCIAL

## 2021-05-11 VITALS
WEIGHT: 195 LBS | TEMPERATURE: 96.7 F | BODY MASS INDEX: 31.34 KG/M2 | DIASTOLIC BLOOD PRESSURE: 80 MMHG | HEIGHT: 66 IN | RESPIRATION RATE: 12 BRPM | SYSTOLIC BLOOD PRESSURE: 122 MMHG | HEART RATE: 64 BPM

## 2021-05-11 DIAGNOSIS — Z91.89 INCREASED RISK OF BREAST CANCER: ICD-10-CM

## 2021-05-11 DIAGNOSIS — D05.00 NEOPLASM OF BREAST, PRIMARY TUMOR STAGING CATEGORY TIS: LOBULAR CARCINOMA IN SITU (LCIS), UNSPECIFIED LATERALITY: Primary | ICD-10-CM

## 2021-05-11 PROCEDURE — 99213 OFFICE O/P EST LOW 20 MIN: CPT | Performed by: NURSE PRACTITIONER

## 2021-05-11 PROCEDURE — 1036F TOBACCO NON-USER: CPT | Performed by: NURSE PRACTITIONER

## 2021-05-11 PROCEDURE — 3008F BODY MASS INDEX DOCD: CPT | Performed by: NURSE PRACTITIONER

## 2021-05-11 PROCEDURE — 3079F DIAST BP 80-89 MM HG: CPT | Performed by: NURSE PRACTITIONER

## 2021-05-11 PROCEDURE — 3074F SYST BP LT 130 MM HG: CPT | Performed by: NURSE PRACTITIONER

## 2021-05-11 NOTE — PROGRESS NOTES
Surgical Oncology Follow Up       [address] [facility]  [facility]  [address]  [place] [address]    [facility]  1955  [id number]      Chief Complaint   Patient presents with    Follow-up     8 month f/u        Assessment/Plan:  1  Neoplasm of breast, primary tumor staging category Tis: lobular carcinoma in situ (LCIS), unspecified laterality      2  Increased risk of breast cancer  - 1 year f/u visit    Discussion/Summary:  Patient is a 56-year-old female who presents today for a follow-up visit for right breast LCIS and ALH  She underwent surgical excision by Dr Zehra Brown recommended annual mammograms as well as annual breast MRIs  She had a bilateral 3D screening mammogram on September 8, 2020 which was BI-RADS 2, category 1 density  She is declining adjuvant imaging secondary to the out-of-pocket costs  I did review the option of a fast breast MRI without a pocket cost of 300 dollars  Patient would like to discuss this with her  and will contact our office if she is interested in pursuing this test   She has declined chemoprevention  She is already ordered/scheduled for a bilateral mammogram in September so I will plan to see her back in 1 year as she receives a clinical breast exam with her gynecologist in November  She was instructed to call with any changes on self-exam   All of her questions were answered today  History of Present Illness:     -Interval History:  Patient does not perform routine exams but has not appreciated any noticeable changes to her breast   She had a bilateral mammogram in September which was BI-RADS 2  Review of Systems:  Review of Systems   Constitutional: Negative for chills, fatigue and fever  Respiratory: Negative for cough and shortness of breath  Cardiovascular: Negative for chest pain  Hematological: Negative for adenopathy     Psychiatric/Behavioral: Negative for confusion  Patient Active Problem List   Diagnosis    Gastroesophageal reflux disease    Insomnia    History of colon polyps    Atypical lobular hyperplasia (ALH) of breast    Benign essential hypertension    Depression with anxiety    Dyslipidemia    Class 1 obesity due to excess calories without serious comorbidity with body mass index (BMI) of 31 0 to 31 9 in adult    Chronic midline low back pain with right-sided sciatica    Breast neoplasm, Tis (LCIS)    Increased risk of breast cancer     Past Medical History:   Diagnosis Date    Arthritis     Chest pain     Colon polyps     GERD (gastroesophageal reflux disease)      2 para 2     Hypertension     Irritable bowel syndrome     Papanicolaou smear 11/15/2018    NEG    Post-menopausal     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      Past Surgical History:   Procedure Laterality Date    BREAST BIOPSY Right 2018    BREAST LUMPECTOMY Right 10/2018    lobular carcinoma in situ    CARPAL TUNNEL RELEASE Left     CTR    COLONOSCOPY  2013    repeat in 5 yrs,  Dr Marbella Wilson  2019    ESOPHAGOGASTRIC FUNDOPLASTY      Nissen fundoplication    ESOPHAGOGASTRODUODENOSCOPY      FOOT SURGERY Left     plantar fascia     FUNDOPLICATION TRANSORAL      GYNECOLOGIC CRYOSURGERY      HERNIA REPAIR  2010    MAMMO (HISTORICAL) Bilateral 2018    check right    MAMMO NEEDLE LOCALIZATION RIGHT (ALL INC) Right 10/23/2018    MAMMO STEREOTACTIC BREAST BIOPSY RIGHT (ALL INC) Right 2018    POLYPECTOMY  2008    ulcerated polypectomy     [place] PERQ DEVICE PLACEMT BREAST LOC 1ST LES W GUIDNCE Right 10/23/2018    Procedure: BREAST LUMPECTOMY; BREAST NEEDLE LOCATION (NEEDLE LOC AT 0830) ;   Surgeon: Jose Eduardo Harmon MD;  [facility];  Service: Surgical Oncology    TONSILLECTOMY AND ADENOIDECTOMY      TUBAL LIGATION      VAGINAL DELIVERY      ,      Family History   Problem Relation Age of Onset    Diabetes Mother     Heart disease Mother     Osteoporosis Mother     Hypertension Mother     Heart disease Father     Skin cancer Father     Hyperlipidemia Father     Cancer Family     Breast cancer Paternal Aunt     No Known Problems Sister     No Known Problems Daughter     No Known Problems Maternal Aunt     No Known Problems Paternal Aunt     Breast cancer Maternal Grandmother     Cervical cancer Paternal Grandmother      Social History     Socioeconomic History    Marital status: /Civil Union     Spouse name: Not on file    Number of children: 2    Years of education: Not on file    Highest education level: Not on file   Occupational History    Not on file   Social Needs    Financial resource strain: Not on file    Food insecurity     Worry: Not on file     Inability: Not on file   [employer] needs     Medical: Not on file     Non-medical: Not on file   Tobacco Use    Smoking status: Never Smoker    Smokeless tobacco: Never Used   Substance and Sexual Activity    Alcohol use: Yes     Frequency: 2-3 times a week     Drinks per session: 1 or 2     Comment: socially    Drug use: No    Sexual activity: Not Currently     Partners: Male     Birth control/protection: Post-menopausal   Lifestyle    Physical activity     Days per week: Not on file     Minutes per session: Not on file    Stress: Not on file   Relationships    Social connections     Talks on phone: Not on file     Gets together: Not on file     Attends Roman Catholic service: Not on file     Active member of club or organization: Not on file     Attends meetings of clubs or organizations: Not on file     Relationship status: Not on file    Intimate partner violence     Fear of current or ex partner: Not on file     Emotionally abused: Not on file     Physically abused: Not on file     Forced sexual activity: Not on file   Other Topics Concern    Not on file   Social History Narrative    Not on file       Current Outpatient Medications:     Calcium-Magnesium-Vitamin D (CITRACAL CALCIUM+D PO), Take by mouth, Disp: , Rfl:     Cholecalciferol (VITAMIN D) 2000 units CAPS, Take 1 tablet by mouth daily, Disp: , Rfl:     docusate sodium (CVS STOOL SOFTENER) 100 mg capsule, Take 1 capsule by mouth daily, Disp: , Rfl:     Flaxseed, Linseed, (FLAXSEED OIL PO), Take 1,400 mg by mouth daily 1400 mg daily , Disp: , Rfl:     fluticasone (FLONASE) 50 mcg/act nasal spray, 2 sprays into each nostril daily, Disp: , Rfl:     Lido-Menthol-Methyl Sal-Camph (CBD KINGS EX), Apply topically, Disp: , Rfl:     loratadine (CLARITIN) 10 mg tablet, Take 10 mg by mouth daily, Disp: , Rfl:     Magnesium 250 MG TABS, Take 250 mg by mouth 2 (two) times a day, Disp: , Rfl:     metoprolol succinate (TOPROL-XL) 50 mg 24 hr tablet, TAKE 1 TABLET BY MOUTH DAILY, Disp: 90 tablet, Rfl: 2    Multiple Vitamins-Minerals (WOMENS 50+ MULTI VITAMIN/MIN PO), Take by mouth, Disp: , Rfl:     RABEprazole (ACIPHEX) 20 MG tablet, Take 1 tablet (20 mg total) by mouth daily, Disp: 90 tablet, Rfl: 3    triamterene-hydrochlorothiazide (DYAZIDE) 37 5-25 mg per capsule, Take 1 capsule by mouth every morning, Disp: 90 capsule, Rfl: 3    zolpidem (AMBIEN) 10 mg tablet, Take 1 tablet (10 mg total) by mouth as needed (as needed) (Patient not taking: Reported on 5/11/2021), Disp: 30 tablet, Rfl: 1  Allergies   Allergen Reactions    Ace Inhibitors Cough    Adhesive [Medical Tape] Itching     Vitals:    05/11/21 0926   BP: 122/80   Pulse: 64   Resp: 12   Temp: (!) 96 7 °F (35 9 °C)       Physical Exam  Constitutional:       Appearance: Normal appearance  HENT:      Head: Normocephalic and atraumatic  Pulmonary:      Effort: Pulmonary effort is normal    Chest:      Breasts:         Right: No swelling, bleeding, inverted nipple, mass, nipple discharge, skin change (surgical scar) or tenderness           Left: No swelling, bleeding, inverted nipple, mass, nipple discharge, skin change or tenderness  Lymphadenopathy:      Upper Body:      Right upper body: No supraclavicular or axillary adenopathy  Left upper body: No supraclavicular or axillary adenopathy  Neurological:      General: No focal deficit present  Mental Status: She is alert and oriented to person, place, and time  Psychiatric:         Mood and Affect: Mood normal            Advance Care Planning/Advance Directives:  Discussed disease status and treatment goals with the patient

## 2021-05-13 ENCOUNTER — TELEPHONE (OUTPATIENT)
Dept: SURGICAL ONCOLOGY | Facility: CLINIC | Age: 66
End: 2021-05-13

## 2021-05-13 DIAGNOSIS — D05.00 NEOPLASM OF BREAST, PRIMARY TUMOR STAGING CATEGORY TIS: LOBULAR CARCINOMA IN SITU (LCIS), UNSPECIFIED LATERALITY: Primary | ICD-10-CM

## 2021-05-13 NOTE — TELEPHONE ENCOUNTER
Order placed   Gautam Jain, can you please call patient to schedule the fast MRI in March so it is staggered with her mammogram? Thanks

## 2021-05-13 NOTE — TELEPHONE ENCOUNTER
Pt called stating she spoke with Marycruz Callahan regarding new machine for breast MRI at [facility]  She had said she was going to discuss with  about interest in having this done, due to insurance not covering  She is interested, and is aware of a out of pocket cost of 300 dollars  Please give patient a call back regarding ordering and scheduling if that is the plan  Her call back number is [phone]

## 2021-05-19 DIAGNOSIS — I10 BENIGN ESSENTIAL HYPERTENSION: Primary | ICD-10-CM

## 2021-05-20 NOTE — TELEPHONE ENCOUNTER
Spoke with pt  Made aware as per provider's orders  Pt verbalized understanding   Blood work orders emailed to pt: [email or web address]

## 2021-05-27 ENCOUNTER — TELEPHONE (OUTPATIENT)
Dept: FAMILY MEDICINE CLINIC | Facility: CLINIC | Age: 66
End: 2021-05-27

## 2021-05-27 NOTE — TELEPHONE ENCOUNTER
Please contact patient  All blood work is normal aside from slight elevation of cholesterol at 225  We will discuss further at her follow-up appointment in June      Thank you

## 2021-06-21 ENCOUNTER — OFFICE VISIT (OUTPATIENT)
Dept: FAMILY MEDICINE CLINIC | Facility: CLINIC | Age: 66
End: 2021-06-21
Payer: COMMERCIAL

## 2021-06-21 VITALS
HEIGHT: 66 IN | WEIGHT: 191.4 LBS | SYSTOLIC BLOOD PRESSURE: 110 MMHG | HEART RATE: 69 BPM | RESPIRATION RATE: 15 BRPM | OXYGEN SATURATION: 99 % | DIASTOLIC BLOOD PRESSURE: 74 MMHG | TEMPERATURE: 97.8 F | BODY MASS INDEX: 30.76 KG/M2

## 2021-06-21 DIAGNOSIS — G89.29 CHRONIC PAIN IN RIGHT FOOT: ICD-10-CM

## 2021-06-21 DIAGNOSIS — D05.00 NEOPLASM OF BREAST, PRIMARY TUMOR STAGING CATEGORY TIS: LOBULAR CARCINOMA IN SITU (LCIS), UNSPECIFIED LATERALITY: ICD-10-CM

## 2021-06-21 DIAGNOSIS — K21.9 GASTROESOPHAGEAL REFLUX DISEASE, UNSPECIFIED WHETHER ESOPHAGITIS PRESENT: ICD-10-CM

## 2021-06-21 DIAGNOSIS — M79.671 CHRONIC PAIN IN RIGHT FOOT: ICD-10-CM

## 2021-06-21 DIAGNOSIS — G47.09 OTHER INSOMNIA: ICD-10-CM

## 2021-06-21 DIAGNOSIS — Z00.00 ENCOUNTER FOR WELLNESS EXAMINATION IN ADULT: Primary | ICD-10-CM

## 2021-06-21 DIAGNOSIS — E78.5 DYSLIPIDEMIA: ICD-10-CM

## 2021-06-21 DIAGNOSIS — I10 BENIGN ESSENTIAL HYPERTENSION: ICD-10-CM

## 2021-06-21 PROCEDURE — 1036F TOBACCO NON-USER: CPT | Performed by: FAMILY MEDICINE

## 2021-06-21 PROCEDURE — 99396 PREV VISIT EST AGE 40-64: CPT | Performed by: FAMILY MEDICINE

## 2021-06-21 PROCEDURE — 3078F DIAST BP <80 MM HG: CPT | Performed by: FAMILY MEDICINE

## 2021-06-21 PROCEDURE — 3008F BODY MASS INDEX DOCD: CPT | Performed by: FAMILY MEDICINE

## 2021-06-21 PROCEDURE — 3074F SYST BP LT 130 MM HG: CPT | Performed by: FAMILY MEDICINE

## 2021-06-21 RX ORDER — MULTIVITAMIN/IRON/FOLIC ACID 18MG-0.4MG
250 TABLET ORAL
COMMUNITY

## 2021-06-21 NOTE — PROGRESS NOTES
FAMILY PRACTICE OFFICE VISIT       NAME: William Austin  AGE: 63 y o  SEX: female       : 1955        MRN: [id number]        Assessment and Plan     1  Encounter for wellness examination in adult  Comments:   Patient is up-to-date with mammogram, gyn exam and colonoscopy  Continue healthy diet and active lifestyle    2  Benign essential hypertension  Assessment & Plan:   Blood pressure is well controlled   Continue regimen of Metoprolol ER 50 mg daily and Dyazide daily     Orders:  -     CBC and differential; Future  -     Comprehensive metabolic panel; Future  -     TSH, 3rd generation; Future    3  Dyslipidemia  Assessment & Plan:   Patient is reluctant to start anti-lipid medications  Continue close monitoring, low-fat diet, weight loss, physical activity    Orders:  -     Lipid Panel with Direct LDL reflex; Future  -     TSH, 3rd generation; Future    4  Other insomnia  Assessment & Plan:  Uses CBD with good results   Off ambien      5  Chronic pain in right foot  -     Ambulatory referral to Podiatry; Future    6  Neoplasm of breast, primary tumor staging category Tis: lobular carcinoma in situ (LCIS), unspecified laterality  Assessment & Plan:  · Patient remains under care of [facility]  · Right breast LCIS and ALH  S/p  surgical excision by Dr Charlie Ellington  ·  Dr Charlie Ellington recommended annual mammograms as well as annual breast MRIs  ·  Last bilateral 3D screening mammogram on 2020 which was BI-RADS 2, category 1 density  · Patient  Is scheduled for mammogram in 2021 and will plan for fast breast MRI in 2022      7  Gastroesophageal reflux disease, unspecified whether esophagitis present  Assessment & Plan:   Symptoms are well controlled with dietary changes and AcipHex 20 mg daily        BMI Counseling: Body mass index is 30 89 kg/m²   The BMI is above normal  Nutrition recommendations include encouraging healthy choices of fruits and vegetables, consuming healthier snacks, moderation in carbohydrate intake, reducing intake of saturated and trans fat and reducing intake of cholesterol  Exercise recommendations include exercising 3-5 times per week  No pharmacotherapy was ordered  There are no Patient Instructions on file for this visit  No follow-ups on file  Discussed with the patient and all questioned fully answered  She will call me if any problems arise  M*Modal software was used to dictate this note  It may contain errors with dictating incorrect words/spelling  Please contact provider directly with any questions  Chief Complaint     Chief Complaint   Patient presents with    Follow-up     6 month f/u        History of Present Illness     Patient presents for annual well exam/ follow-up  Daily medications include AcipHex for acid reflux disease and metoprolol and Dyazide for hypertension  Patient reports significant improvement of chronic insomnia symptoms as she started using CBD containing supplements  She has not been using Ambien for quite a while  Patient is grieving death of her father, coping well  Good family and friend support  She remains under ongoing care of [facility] due to history of right breast AL H /LCIS  Patient is followed with annual mammograms  She was supposed to schedule annual breast MRI which was not covered by her insurance  Recently she discussed option of fast MRI with Surgical Oncology and is planning to schedule it next year In March  Patient is up-to-date with gyn, last office visit November 21  Next mammogram is scheduled in September  We reviewed results of blood work performed in May of 2021  All labs are normal aside from elevated total cholesterol at 225 with LDL of 145 an HDL of 57  Patient states that she has been enjoying her cheese  She is not able to exercise much due to chronic right foot pain    Patient is looking for podiatry referral     Patient denies symptoms of chest pain, palpitations, shortness of breath or dizziness  Chronic GERD symptoms are well controlled on PPI  Patient follows strict bland diet  Patient declines COVID-19 vaccination  Review of Systems   Review of Systems   Constitutional: Negative  HENT: Negative  Eyes: Negative  Respiratory: Negative  Cardiovascular: Negative  Gastrointestinal: Negative  Endocrine: Negative  Genitourinary: Negative  Musculoskeletal: Positive for arthralgias and back pain  Skin: Negative  Allergic/Immunologic: Negative  Neurological: Negative  Hematological: Negative  Psychiatric/Behavioral: Negative          Active Problem List     Patient Active Problem List   Diagnosis    Gastroesophageal reflux disease    Insomnia    History of colon polyps    Benign essential hypertension    Depression with anxiety    Dyslipidemia    Chronic midline low back pain with right-sided sciatica    Breast neoplasm, Tis (LCIS)    Increased risk of breast cancer       Past Medical History:  Past Medical History:   Diagnosis Date    Arthritis     Atypical lobular hyperplasia (ALH) of breast 2018    10/23/2018 right lumpectomy  Foci of lobular neoplasia (ALH/LCIS)    Chest pain     Colon polyps     GERD (gastroesophageal reflux disease)      2 para 2     Hypertension     Irritable bowel syndrome     Papanicolaou smear 11/15/2018    NEG    Post-menopausal     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        Past Surgical History:  Past Surgical History:   Procedure Laterality Date    BREAST BIOPSY Right 2018    BREAST LUMPECTOMY Right 10/2018    lobular carcinoma in situ    CARPAL TUNNEL RELEASE Left     CTR    COLONOSCOPY  2013    repeat in 5 yrs,  Dr Malcolm Harvey    COLONOSCOPY  2019    ESOPHAGOGASTRIC FUNDOPLASTY      Nissen fundoplication    ESOPHAGOGASTRODUODENOSCOPY      FOOT SURGERY Left     plantar fascia     FUNDOPLICATION TRANSORAL      GYNECOLOGIC CRYOSURGERY  1995    HERNIA REPAIR  2010    MAMMO (HISTORICAL) Bilateral 08/28/2018    check right    MAMMO NEEDLE LOCALIZATION RIGHT (ALL INC) Right 10/23/2018    MAMMO STEREOTACTIC BREAST BIOPSY RIGHT (ALL INC) Right 8/29/2018    POLYPECTOMY  2008    ulcerated polypectomy     MO PERQ DEVICE PLACEMT BREAST LOC 1ST LES W MACARIOE Right 10/23/2018    Procedure: BREAST LUMPECTOMY; BREAST NEEDLE LOCATION (NEEDLE LOC AT 0830) ;   Surgeon: Kourtney Encinas MD;  [facility];  Service: Surgical Oncology    TONSILLECTOMY AND ADENOIDECTOMY      TUBAL LIGATION      VAGINAL DELIVERY      1980, 1982       Family History:  Family History   Problem Relation Age of Onset    Diabetes Mother     Heart disease Mother     Osteoporosis Mother     Hypertension Mother     Heart disease Father     Skin cancer Father     Hyperlipidemia Father     Cancer Family     Breast cancer Paternal Aunt     No Known Problems Sister     No Known Problems Daughter     No Known Problems Maternal Aunt     No Known Problems Paternal Aunt     Breast cancer Maternal Grandmother     Cervical cancer Paternal Grandmother        Social History:  Social History     Socioeconomic History    Marital status: /Civil Union     Spouse name: Not on file    Number of children: 2    Years of education: Not on file    Highest education level: Not on file   Occupational History    Not on file   Tobacco Use    Smoking status: Never Smoker    Smokeless tobacco: Never Used   Vaping Use    Vaping Use: Never used   Substance and Sexual Activity    Alcohol use: Yes     Comment: socially    Drug use: No    Sexual activity: Not Currently     Partners: Male     Birth control/protection: Post-menopausal   Other Topics Concern    Not on file   Social History Narrative    Not on file     Social Determinants of Health     Financial Resource Strain:     Difficulty of Paying Living Expenses:    Food Insecurity:     Worried About [facility] in the Last Year:    [address] in the Last Year:    Transportation Needs:     Lack of Transportation (Medical):  Lack of Transportation (Non-Medical):    Physical Activity:     Days of Exercise per Week:     Minutes of Exercise per Session:    Stress:     Feeling of Stress :    Social Connections:     Frequency of Communication with Friends and Family:     Frequency of Social Gatherings with Friends and Family:     Attends Restoration Services:     Active Member of Clubs or Organizations:     Attends Club or Organization Meetings:     Marital Status:    Intimate Partner Violence:     Fear of Current or Ex-Partner:     Emotionally Abused:     Physically Abused:     Sexually Abused:          Objective     Vitals:    06/21/21 0951   BP: 110/74   BP Location: Left arm   Patient Position: Sitting   Cuff Size: Large   Pulse: 69   Resp: 15   Temp: 97 8 °F (36 6 °C)   TempSrc: Temporal   SpO2: 99%   Weight: 86 8 kg (191 lb 6 4 oz)   Height: 5' 6" (1 676 m)       Wt Readings from Last 3 Encounters:   06/21/21 86 8 kg (191 lb 6 4 oz)   05/11/21 88 5 kg (195 lb)   12/21/20 87 2 kg (192 lb 3 2 oz)       Physical Exam  Vitals and nursing note reviewed  Constitutional:       General: She is not in acute distress  Appearance: Normal appearance  She is well-developed  HENT:      Head: Normocephalic and atraumatic  Eyes:      Conjunctiva/sclera: Conjunctivae normal    Neck:      Thyroid: No thyromegaly  Vascular: No carotid bruit  Cardiovascular:      Rate and Rhythm: Normal rate and regular rhythm  Heart sounds: Normal heart sounds  No murmur heard  Pulmonary:      Effort: Pulmonary effort is normal  No respiratory distress  Breath sounds: Normal breath sounds  No wheezing  Abdominal:      General: Abdomen is flat  Bowel sounds are normal  There is no abdominal bruit  Palpations: Abdomen is soft     Musculoskeletal:         General: Normal range of motion  Cervical back: Neck supple  Right lower leg: No edema  Left lower leg: No edema  Skin:     General: Skin is warm  Findings: No rash  Neurological:      General: No focal deficit present  Mental Status: She is alert and oriented to person, place, and time  Cranial Nerves: No cranial nerve deficit  Coordination: Coordination normal    Psychiatric:         Mood and Affect: Mood normal          Behavior: Behavior normal          Thought Content:  Thought content normal           Pertinent Laboratory/Diagnostic Studies:    Lab Results   Component Value Date    WBC 6 1 05/10/2016    HGB 14 4 05/10/2016    HCT 44 1 05/10/2016    MCV 95 4 05/10/2016     05/10/2016       No results found for: TSH    Lab Results   Component Value Date    CHOL 215 (H) 05/10/2016     Lab Results   Component Value Date    TRIG 89 05/10/2016     Lab Results   Component Value Date    HDL 58 05/10/2016     Lab Results   Component Value Date    LDLCALC 111 (H) 06/10/2015     No results found for: HGBA1C  Lab Results   Component Value Date    K 4 5 05/10/2016     05/10/2016    CO2 26 05/10/2016    ANIONGAP 5 06/10/2015    BUN 17 05/10/2016    CREATININE 0 75 05/10/2016    CALCIUM 9 9 05/10/2016    AST 19 05/10/2016    ALT 24 05/10/2016    ALKPHOS 50 05/10/2016    PROT 6 8 05/10/2016    BILITOT 0 5 05/10/2016       Orders Placed This Encounter   Procedures    CBC and differential    Comprehensive metabolic panel    Lipid Panel with Direct LDL reflex    TSH, 3rd generation    Ambulatory referral to Podiatry       ALLERGIES:  Allergies   Allergen Reactions    Ace Inhibitors Cough    Adhesive [Medical Tape] Itching       Current Medications     Current Outpatient Medications   Medication Sig Dispense Refill    Calcium-Magnesium-Vitamin D (CITRACAL CALCIUM+D PO) Take by mouth      Cholecalciferol (VITAMIN D) 2000 units CAPS Take 1 tablet by mouth daily      docusate sodium ([employer] STOOL SOFTENER) 100 mg capsule Take 1 capsule by mouth daily      Flaxseed, Linseed, (FLAXSEED OIL PO) Take 1,400 mg by mouth daily 1400 mg daily       fluticasone (FLONASE) 50 mcg/act nasal spray 2 sprays into each nostril daily      Lido-Menthol-Methyl Sal-Camph (CBD KINGS EX) Apply topically      loratadine (CLARITIN) 10 mg tablet Take 10 mg by mouth daily      Magnesium Oxide 250 MG TABS Take 250 mg by mouth      metoprolol succinate (TOPROL-XL) 50 mg 24 hr tablet TAKE 1 TABLET BY MOUTH DAILY 90 tablet 2    Multiple Vitamins-Minerals (WOMENS 50+ MULTI VITAMIN/MIN PO) Take by mouth      RABEprazole (ACIPHEX) 20 MG tablet Take 1 tablet (20 mg total) by mouth daily 90 tablet 3    triamterene-hydrochlorothiazide (DYAZIDE) 37 5-25 mg per capsule Take 1 capsule by mouth every morning 90 capsule 3    Magnesium 250 MG TABS Take 250 mg by mouth 2 (two) times a day (Patient not taking: Reported on 6/21/2021)       No current facility-administered medications for this visit         Medications Discontinued During This Encounter   Medication Reason    zolpidem (AMBIEN) 10 mg tablet        Health Maintenance     Health Maintenance   Topic Date Due    Hepatitis C Screening  Never done    PT PLAN OF CARE  Never done    COVID-19 Vaccine (1) Never done    HIV Screening  Never done    DTaP,Tdap,and Td Vaccines (1 - Tdap) Never done    BMI: Followup Plan  06/28/2021    Annual Physical  11/18/2021    Colorectal Cancer Screening  06/12/2022    BMI: Adult  06/21/2022    MAMMOGRAM  09/08/2022    Cervical Cancer Screening  11/15/2023    Influenza Vaccine  Completed    Pneumococcal Vaccine: Pediatrics (0 to 5 Years) and At-Risk Patients (6 to 59 Years)  Aged Out    HIB Vaccine  Aged Out    Hepatitis B Vaccine  Aged Out    IPV Vaccine  Aged Out    Hepatitis A Vaccine  Aged Out    Meningococcal ACWY Vaccine  Aged Out    HPV Vaccine  Aged [employer] History   Administered Date(s) Administered    Influenza Quadrivalent Preservative Free 3 years and older IM 11/14/2016, 09/16/2017    Influenza, injectable, quadrivalent, preservative free 0 5 mL 10/26/2019, 09/12/2020    Influenza, recombinant, quadrivalent,injectable, preservative free 09/06/2018    Influenza, seasonal, injectable 10/09/2013, 09/15/2014, 10/02/2015       Montse Sweet MD

## 2021-06-27 PROBLEM — D05.00 BREAST NEOPLASM, TIS (LCIS): Chronic | Status: ACTIVE | Noted: 2020-09-10

## 2021-06-27 PROBLEM — K21.9 GASTROESOPHAGEAL REFLUX DISEASE: Chronic | Status: ACTIVE | Noted: 2018-02-19

## 2021-06-27 PROBLEM — G47.00 INSOMNIA: Chronic | Status: ACTIVE | Noted: 2018-02-19

## 2021-06-27 PROBLEM — N60.99 ATYPICAL LOBULAR HYPERPLASIA (ALH) OF BREAST: Status: RESOLVED | Noted: 2018-08-31 | Resolved: 2021-06-27

## 2021-06-27 NOTE — ASSESSMENT & PLAN NOTE
Patient is reluctant to start anti-lipid medications      Continue close monitoring, low-fat diet, weight loss, physical activity

## 2021-06-27 NOTE — ASSESSMENT & PLAN NOTE
· Patient remains under care of [facility]  · Right breast LCIS and ALH  S/p  surgical excision by Dr Dante Schroeder  ·  Dr Dante Schroeder recommended annual mammograms as well as annual breast MRIs     ·  Last bilateral 3D screening mammogram on September 8, 2020 which was BI-RADS 2, category 1 density  · Patient  Is scheduled for mammogram in September 2021 and will plan for fast breast MRI in March of 2022

## 2021-09-09 ENCOUNTER — HOSPITAL ENCOUNTER (OUTPATIENT)
Dept: RADIOLOGY | Age: 66
Discharge: HOME | End: 2021-09-09
Payer: COMMERCIAL

## 2021-09-09 VITALS — BODY MASS INDEX: 30.53 KG/M2 | WEIGHT: 190 LBS | HEIGHT: 66 IN

## 2021-09-09 DIAGNOSIS — Z12.31 ENCOUNTER FOR SCREENING MAMMOGRAM FOR BREAST CANCER: ICD-10-CM

## 2021-09-09 PROCEDURE — 77067 SCR MAMMO BI INCL CAD: CPT

## 2021-09-09 PROCEDURE — 77063 BREAST TOMOSYNTHESIS BI: CPT

## 2021-11-01 DIAGNOSIS — I10 BENIGN ESSENTIAL HYPERTENSION: ICD-10-CM

## 2021-11-01 RX ORDER — TRIAMTERENE AND HYDROCHLOROTHIAZIDE 37.5; 25 MG/1; MG/1
1 CAPSULE ORAL EVERY MORNING
Qty: 90 CAPSULE | Refills: 3 | Status: SHIPPED | OUTPATIENT
Start: 2021-11-01 | End: 2021-11-03

## 2021-11-03 DIAGNOSIS — I10 BENIGN ESSENTIAL HYPERTENSION: ICD-10-CM

## 2021-11-03 RX ORDER — TRIAMTERENE AND HYDROCHLOROTHIAZIDE 37.5; 25 MG/1; MG/1
1 CAPSULE ORAL EVERY MORNING
Qty: 90 CAPSULE | Refills: 2 | Status: SHIPPED | OUTPATIENT
Start: 2021-11-03 | End: 2022-06-20 | Stop reason: SDUPTHER

## 2021-11-08 ENCOUNTER — TELEMEDICINE (OUTPATIENT)
Dept: FAMILY MEDICINE CLINIC | Facility: CLINIC | Age: 66
End: 2021-11-08
Payer: COMMERCIAL

## 2021-11-08 ENCOUNTER — TELEPHONE (OUTPATIENT)
Dept: FAMILY MEDICINE CLINIC | Facility: CLINIC | Age: 66
End: 2021-11-08

## 2021-11-08 VITALS
HEART RATE: 82 BPM | WEIGHT: 188 LBS | OXYGEN SATURATION: 95 % | HEIGHT: 66 IN | BODY MASS INDEX: 30.22 KG/M2 | TEMPERATURE: 98.4 F

## 2021-11-08 DIAGNOSIS — K21.9 GASTROESOPHAGEAL REFLUX DISEASE, UNSPECIFIED WHETHER ESOPHAGITIS PRESENT: ICD-10-CM

## 2021-11-08 DIAGNOSIS — U07.1 COVID-19 VIRUS INFECTION: Primary | ICD-10-CM

## 2021-11-08 PROCEDURE — 1036F TOBACCO NON-USER: CPT | Performed by: FAMILY MEDICINE

## 2021-11-08 PROCEDURE — 3008F BODY MASS INDEX DOCD: CPT | Performed by: FAMILY MEDICINE

## 2021-11-08 PROCEDURE — 3725F SCREEN DEPRESSION PERFORMED: CPT | Performed by: FAMILY MEDICINE

## 2021-11-08 PROCEDURE — 99213 OFFICE O/P EST LOW 20 MIN: CPT | Performed by: FAMILY MEDICINE

## 2021-11-08 RX ORDER — FAMOTIDINE 40 MG/1
40 TABLET, FILM COATED ORAL
Qty: 30 TABLET | Refills: 0 | Status: SHIPPED | OUTPATIENT
Start: 2021-11-08 | End: 2021-12-10

## 2021-12-06 ENCOUNTER — ANNUAL EXAM (OUTPATIENT)
Dept: OBGYN CLINIC | Facility: CLINIC | Age: 66
End: 2021-12-06
Payer: COMMERCIAL

## 2021-12-06 VITALS
HEIGHT: 66 IN | BODY MASS INDEX: 29.41 KG/M2 | SYSTOLIC BLOOD PRESSURE: 120 MMHG | DIASTOLIC BLOOD PRESSURE: 80 MMHG | WEIGHT: 183 LBS

## 2021-12-06 DIAGNOSIS — Z12.31 ENCOUNTER FOR SCREENING MAMMOGRAM FOR MALIGNANT NEOPLASM OF BREAST: ICD-10-CM

## 2021-12-06 DIAGNOSIS — Z01.419 ROUTINE GYNECOLOGICAL EXAMINATION: Primary | ICD-10-CM

## 2021-12-06 DIAGNOSIS — Z11.51 SCREENING FOR HPV (HUMAN PAPILLOMAVIRUS): ICD-10-CM

## 2021-12-06 PROCEDURE — G0476 HPV COMBO ASSAY CA SCREEN: HCPCS | Performed by: OBSTETRICS & GYNECOLOGY

## 2021-12-06 PROCEDURE — G0145 SCR C/V CYTO,THINLAYER,RESCR: HCPCS | Performed by: OBSTETRICS & GYNECOLOGY

## 2021-12-06 PROCEDURE — S0612 ANNUAL GYNECOLOGICAL EXAMINA: HCPCS | Performed by: OBSTETRICS & GYNECOLOGY

## 2021-12-10 LAB
LAB AP GYN PRIMARY INTERPRETATION: NORMAL
Lab: NORMAL

## 2021-12-13 ENCOUNTER — RA CDI HCC (OUTPATIENT)
Dept: OTHER | Facility: HOSPITAL | Age: 66
End: 2021-12-13

## 2021-12-20 ENCOUNTER — OFFICE VISIT (OUTPATIENT)
Dept: FAMILY MEDICINE CLINIC | Facility: CLINIC | Age: 66
End: 2021-12-20
Payer: COMMERCIAL

## 2021-12-20 VITALS
SYSTOLIC BLOOD PRESSURE: 118 MMHG | DIASTOLIC BLOOD PRESSURE: 80 MMHG | HEIGHT: 66 IN | TEMPERATURE: 98 F | RESPIRATION RATE: 18 BRPM | OXYGEN SATURATION: 98 % | HEART RATE: 79 BPM | BODY MASS INDEX: 29.89 KG/M2 | WEIGHT: 186 LBS

## 2021-12-20 DIAGNOSIS — K21.9 GASTROESOPHAGEAL REFLUX DISEASE: ICD-10-CM

## 2021-12-20 DIAGNOSIS — I10 BENIGN ESSENTIAL HYPERTENSION: Primary | Chronic | ICD-10-CM

## 2021-12-20 DIAGNOSIS — I10 HYPERTENSION, UNSPECIFIED TYPE: ICD-10-CM

## 2021-12-20 PROCEDURE — 99213 OFFICE O/P EST LOW 20 MIN: CPT | Performed by: FAMILY MEDICINE

## 2021-12-20 PROCEDURE — 1036F TOBACCO NON-USER: CPT | Performed by: FAMILY MEDICINE

## 2021-12-20 PROCEDURE — 3008F BODY MASS INDEX DOCD: CPT | Performed by: FAMILY MEDICINE

## 2021-12-20 RX ORDER — RABEPRAZOLE SODIUM 20 MG/1
20 TABLET, DELAYED RELEASE ORAL DAILY
Qty: 90 TABLET | Refills: 3 | Status: SHIPPED | OUTPATIENT
Start: 2021-12-20 | End: 2022-06-20 | Stop reason: SDUPTHER

## 2021-12-20 RX ORDER — METOPROLOL SUCCINATE 25 MG/1
TABLET, EXTENDED RELEASE ORAL
Qty: 90 TABLET | Refills: 1 | Status: SHIPPED | OUTPATIENT
Start: 2021-12-20 | End: 2022-06-07

## 2022-03-09 ENCOUNTER — TELEPHONE (OUTPATIENT)
Dept: SURGICAL ONCOLOGY | Facility: CLINIC | Age: 67
End: 2022-03-09

## 2022-03-09 ENCOUNTER — TELEPHONE (OUTPATIENT)
Dept: HEMATOLOGY ONCOLOGY | Facility: CLINIC | Age: 67
End: 2022-03-09

## 2022-03-09 ENCOUNTER — HOSPITAL ENCOUNTER (OUTPATIENT)
Dept: MRI IMAGING | Facility: HOSPITAL | Age: 67
Discharge: HOME | End: 2022-03-09
Payer: COMMERCIAL

## 2022-03-09 DIAGNOSIS — D05.00 NEOPLASM OF BREAST, PRIMARY TUMOR STAGING CATEGORY TIS: LOBULAR CARCINOMA IN SITU (LCIS), UNSPECIFIED LATERALITY: ICD-10-CM

## 2022-03-09 PROCEDURE — A9585 GADOBUTROL INJECTION: HCPCS | Performed by: NURSE PRACTITIONER

## 2022-03-09 RX ADMIN — GADOBUTROL 8 ML: 604.72 INJECTION INTRAVENOUS at 11:22

## 2022-03-09 NOTE — TELEPHONE ENCOUNTER
Appointment Cancellation Or Reschedule     Person calling in Patient    Provider Lisa Mckeon   Office Visit Date and Time 5/12/22 @ 9:30am   Office Visit Location [facility]   Did patient want to reschedule their office appointment? If so, when was it scheduled to? Yes 5/12/22 @ 11am    Is this patient calling to reschedule an infusion appointment? no   When is their next infusion appointment? na   Is this patient a Chemo patient? no   Reason for Cancellation or Reschedule Patient wanted to see Saji Rowe in [facility]     If the patient is a treatment patient, please route this to the office nurse  If the patient is not on treatment, please route to the office MA

## 2022-03-09 NOTE — TELEPHONE ENCOUNTER
Left Voicemail for patient to call back and r/s their appointment with Kimmie Lagos at [facility]  If patient is a BREAST PATIENT:   -they can see Radha Spivey at [facility] or Kimmie Lagos at [facility]     If patient is a NONBREAST PATIENT:   -they can see Aleta at [facility] or Radha Spivey at [facility] on Fridays

## 2022-05-12 ENCOUNTER — OFFICE VISIT (OUTPATIENT)
Dept: SURGICAL ONCOLOGY | Facility: CLINIC | Age: 67
End: 2022-05-12
Payer: COMMERCIAL

## 2022-05-12 VITALS
DIASTOLIC BLOOD PRESSURE: 80 MMHG | HEIGHT: 66 IN | BODY MASS INDEX: 30.22 KG/M2 | TEMPERATURE: 98.1 F | SYSTOLIC BLOOD PRESSURE: 128 MMHG | HEART RATE: 83 BPM | OXYGEN SATURATION: 98 % | RESPIRATION RATE: 18 BRPM | WEIGHT: 188 LBS

## 2022-05-12 DIAGNOSIS — Z12.31 VISIT FOR SCREENING MAMMOGRAM: ICD-10-CM

## 2022-05-12 DIAGNOSIS — Z91.89 INCREASED RISK OF BREAST CANCER: Primary | ICD-10-CM

## 2022-05-12 DIAGNOSIS — N60.99 ATYPICAL LOBULAR HYPERPLASIA (ALH) OF BREAST: ICD-10-CM

## 2022-05-12 PROCEDURE — 1036F TOBACCO NON-USER: CPT | Performed by: NURSE PRACTITIONER

## 2022-05-12 PROCEDURE — 99213 OFFICE O/P EST LOW 20 MIN: CPT | Performed by: NURSE PRACTITIONER

## 2022-05-12 PROCEDURE — 3008F BODY MASS INDEX DOCD: CPT | Performed by: NURSE PRACTITIONER

## 2022-05-12 NOTE — PROGRESS NOTES
Surgical Oncology Follow Up       [facility]  [facility] [id number]    [facility]  1955  [id number]      Chief Complaint   Patient presents with    Follow-up       Assessment/Plan:  1  Increased risk of breast cancer  - BUN; Future  - Creatinine, serum; Future  - MRI Fast Breast; Future  - 1 year f/u visit    2  Atypical lobular hyperplasia (ALH) of breast  - MRI Fast Breast; Future    3  Visit for screening mammogram  - Mammo screening bilateral w 3d & cad; Future      Discussion/Summary: Patient is a 72-year-old female who presents today for a follow-up visit for right breast LCIS and ALH   She underwent surgical excision by Dr De Saucedo recommended annual mammograms as well as annual breast MRIs  She has declined chemoprevention    She had a bilateral 3D screening mammogram in September a fast breast MRI in March of 2022 which were BI-RADS 1  She offers no new complaints today and there are no concerns on today's exam   I will make arrangements for her imaging and I will plan to see her back in 1 year or sooner should the need arise  She will receive a clinical breast exam with her gynecologist in the interim  She was instructed to call with any new concerns prior to her next visit  All of her questions were answered today  History of Present Illness:     -Interval History:  Patient presents today for follow-up visit for an increased risk of breast cancer  She has no new breast complaints  She had a bilateral mammogram in September which was BI-RADS 1 and a fast breast MRI in March which was also BI-RADS 1  Reports no changes in family history  Review of Systems:  Review of Systems   Constitutional: Negative for chills, fatigue and fever  Respiratory: Negative for cough and shortness of breath  Cardiovascular: Negative for chest pain  Hematological: Negative for adenopathy  Psychiatric/Behavioral: Negative for confusion  Patient Active Problem List   Diagnosis    Gastroesophageal reflux disease    Insomnia    History of colon polyps    Hypertension    Depression with anxiety    Dyslipidemia    Chronic midline low back pain with right-sided sciatica    Breast neoplasm, Tis (LCIS)    Increased risk of breast cancer     Past Medical History:   Diagnosis Date    Arthritis     Atypical lobular hyperplasia (ALH) of breast 2018    10/23/2018 right lumpectomy  Foci of lobular neoplasia (ALH/LCIS)    Chest pain     Colon polyps     GERD (gastroesophageal reflux disease)      2 para 2     Hypertension     Irritable bowel syndrome     Papanicolaou smear 11/15/2018    NEG    Post-menopausal     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      Past Surgical History:   Procedure Laterality Date    BREAST BIOPSY Right 2018    BREAST LUMPECTOMY Right 10/2018    lobular carcinoma in situ    CARPAL TUNNEL RELEASE Left     CTR    COLONOSCOPY  2013    repeat in 5 yrs,  Dr Rico Carreno  2019    ESOPHAGOGASTRIC FUNDOPLASTY      Nissen fundoplication    ESOPHAGOGASTRODUODENOSCOPY      FOOT SURGERY Left     plantar fascia     FUNDOPLICATION TRANSORAL      GYNECOLOGIC CRYOSURGERY      HERNIA REPAIR  2010    MAMMO (HISTORICAL) Bilateral 2018    check right    MAMMO NEEDLE LOCALIZATION RIGHT (ALL INC) Right 10/23/2018    MAMMO STEREOTACTIC BREAST BIOPSY RIGHT (ALL INC) Right 2018    POLYPECTOMY  2008    ulcerated polypectomy     FL PERQ DEVICE PLACEMT BREAST LOC 1ST LES W [place] Right 10/23/2018    Procedure: BREAST LUMPECTOMY; BREAST NEEDLE LOCATION (NEEDLE LOC AT 0830) ;   Surgeon: Willow Baumann MD;  [facility];  Service: Surgical Oncology    TONSILLECTOMY AND ADENOIDECTOMY      TUBAL LIGATION      [address]INAL [address],      Family History   Problem Relation Age of Onset    Diabetes Mother    Hui Shilo Heart disease Mother     Osteoporosis Mother     Hypertension Mother     Heart disease Father     Skin cancer Father     Hyperlipidemia Father     Cancer Family     Breast cancer Paternal Aunt         unknown age   [facility] No Known Problems Sister     No Known Problems Daughter     No Known Problems Maternal Aunt     No Known Problems Paternal Aunt     Breast cancer Maternal Grandmother         unknonw age   [facility] Cervical cancer Paternal Grandmother         unknown age   [facility] No Known Problems Maternal Grandfather     No Known Problems Paternal Grandfather     No Known Problems Maternal Aunt     No Known Problems Maternal Aunt     No Known Problems Paternal Aunt      Social History     Socioeconomic History    Marital status: /Civil Union     Spouse name: Not on file    Number of children: 2    Years of education: Not on file    Highest education level: Not on file   Occupational History    Not on file   Tobacco Use    Smoking status: Never Smoker    Smokeless tobacco: Never Used   Vaping Use    Vaping Use: Never used   Substance and Sexual Activity    Alcohol use: Yes     Comment: socially    Drug use: No    Sexual activity: Not Currently     Partners: Male     Birth control/protection: Post-menopausal   Other Topics Concern    Not on file   Social History Narrative    Not on file     Social Determinants of Health     Financial Resource Strain: Not on file   Food Insecurity: Not on file   Transportation Needs: Not on file   Physical Activity: Not on file   Stress: Not on file   Social Connections: Not on file   Intimate Partner Violence: Not on file   Housing Stability: Not on file       Current Outpatient Medications:     Calcium-Magnesium-Vitamin D (CITRACAL CALCIUM+D PO), Take by mouth, Disp: , Rfl:     Cholecalciferol (VITAMIN D) 2000 units CAPS, Take 1 tablet by mouth daily, Disp: , Rfl:     docusate sodium (COLACE) 100 mg capsule, Take 1 capsule by mouth daily  , Disp: , Rfl:    fluticasone (FLONASE) 50 mcg/act nasal spray, 2 sprays into each nostril daily, Disp: , Rfl:     KRILL OIL PO, Take 1,600 mg by mouth 2 capsules daily, Disp: , Rfl:     Lido-Menthol-Methyl Sal-Camph (CBD KINGS EX), Apply topically, Disp: , Rfl:     Magnesium Oxide 250 MG TABS, Take 250 mg by mouth, Disp: , Rfl:     metoprolol succinate (TOPROL-XL) 25 mg 24 hr tablet, Take 1 tablet once a day, Disp: 90 tablet, Rfl: 1    Multiple Vitamins-Minerals (WOMENS 50+ MULTI VITAMIN/MIN PO), Take by mouth, Disp: , Rfl:     RABEprazole (ACIPHEX) 20 MG tablet, Take 1 tablet (20 mg total) by mouth daily, Disp: 90 tablet, Rfl: 3    triamterene-hydrochlorothiazide (DYAZIDE) 37 5-25 mg per capsule, TAKE 1 CAPSULE BY MOUTH EVERY MORNING, Disp: 90 capsule, Rfl: 2  Allergies   Allergen Reactions    Ace Inhibitors Cough    Adhesive [Medical Tape] Itching     Vitals:    05/12/22 1057   BP: 128/80   Pulse: 83   Resp: 18   Temp: 98 1 °F (36 7 °C)   SpO2: 98%       Physical Exam  Constitutional:       Appearance: Normal appearance  HENT:      Head: Normocephalic and atraumatic  Pulmonary:      Effort: Pulmonary effort is normal    Chest:   Breasts:      Right: No swelling, bleeding, inverted nipple, mass, nipple discharge, skin change (surgical scar), tenderness, axillary adenopathy or supraclavicular adenopathy  Left: No swelling, bleeding, inverted nipple, mass, nipple discharge, skin change, tenderness, axillary adenopathy or supraclavicular adenopathy  Lymphadenopathy:      Upper Body:      Right upper body: No supraclavicular or axillary adenopathy  Left upper body: No supraclavicular or axillary adenopathy  Neurological:      General: No focal deficit present  Mental Status: She is alert and oriented to person, place, and time  Psychiatric:         Mood and Affect: Mood normal          Advance Care Planning/Advance Directives:  Discussed disease status and treatment goals with the patient

## 2022-06-06 ENCOUNTER — RA CDI HCC (OUTPATIENT)
Dept: OTHER | Facility: HOSPITAL | Age: 67
End: 2022-06-06

## 2022-06-06 NOTE — PROGRESS NOTES
[facility]  coding opportunities       Chart reviewed, no opportunity found: CHART REVIEWED, NO OPPORTUNITY FOUND        Patients Insurance        Commercial Insurance: [address] [place] [address]

## 2022-06-07 DIAGNOSIS — I10 HYPERTENSION, UNSPECIFIED TYPE: ICD-10-CM

## 2022-06-07 RX ORDER — METOPROLOL SUCCINATE 25 MG/1
TABLET, EXTENDED RELEASE ORAL
Qty: 90 TABLET | Refills: 0 | Status: SHIPPED | OUTPATIENT
Start: 2022-06-07 | End: 2022-06-20 | Stop reason: SDUPTHER

## 2022-06-20 ENCOUNTER — OFFICE VISIT (OUTPATIENT)
Dept: FAMILY MEDICINE CLINIC | Facility: CLINIC | Age: 67
End: 2022-06-20
Payer: COMMERCIAL

## 2022-06-20 VITALS
TEMPERATURE: 98.2 F | OXYGEN SATURATION: 98 % | HEIGHT: 66 IN | BODY MASS INDEX: 31.02 KG/M2 | RESPIRATION RATE: 16 BRPM | DIASTOLIC BLOOD PRESSURE: 70 MMHG | SYSTOLIC BLOOD PRESSURE: 120 MMHG | WEIGHT: 193 LBS | HEART RATE: 68 BPM

## 2022-06-20 DIAGNOSIS — I10 HYPERTENSION, UNSPECIFIED TYPE: ICD-10-CM

## 2022-06-20 DIAGNOSIS — K21.9 GASTROESOPHAGEAL REFLUX DISEASE, UNSPECIFIED WHETHER ESOPHAGITIS PRESENT: Chronic | ICD-10-CM

## 2022-06-20 DIAGNOSIS — I10 PRIMARY HYPERTENSION: ICD-10-CM

## 2022-06-20 DIAGNOSIS — E78.5 DYSLIPIDEMIA: Chronic | ICD-10-CM

## 2022-06-20 DIAGNOSIS — Z00.00 ENCOUNTER FOR WELLNESS EXAMINATION IN ADULT: Primary | ICD-10-CM

## 2022-06-20 DIAGNOSIS — G25.3 MYOKYMIA: ICD-10-CM

## 2022-06-20 DIAGNOSIS — F41.8 DEPRESSION WITH ANXIETY: ICD-10-CM

## 2022-06-20 DIAGNOSIS — R74.01 ELEVATED ALT MEASUREMENT: ICD-10-CM

## 2022-06-20 DIAGNOSIS — K21.9 GASTROESOPHAGEAL REFLUX DISEASE WITHOUT ESOPHAGITIS: ICD-10-CM

## 2022-06-20 DIAGNOSIS — Z12.11 COLON CANCER SCREENING: ICD-10-CM

## 2022-06-20 PROCEDURE — 1036F TOBACCO NON-USER: CPT | Performed by: FAMILY MEDICINE

## 2022-06-20 PROCEDURE — 99396 PREV VISIT EST AGE 40-64: CPT | Performed by: FAMILY MEDICINE

## 2022-06-20 PROCEDURE — 3008F BODY MASS INDEX DOCD: CPT | Performed by: FAMILY MEDICINE

## 2022-06-20 RX ORDER — TRIAMTERENE AND HYDROCHLOROTHIAZIDE 37.5; 25 MG/1; MG/1
1 CAPSULE ORAL EVERY MORNING
Qty: 90 CAPSULE | Refills: 3 | Status: SHIPPED | OUTPATIENT
Start: 2022-06-20

## 2022-06-20 RX ORDER — METOPROLOL SUCCINATE 25 MG/1
25 TABLET, EXTENDED RELEASE ORAL DAILY
Qty: 90 TABLET | Refills: 3 | Status: SHIPPED | OUTPATIENT
Start: 2022-06-20

## 2022-06-20 RX ORDER — RABEPRAZOLE SODIUM 20 MG/1
20 TABLET, DELAYED RELEASE ORAL DAILY
Qty: 90 TABLET | Refills: 3 | Status: SHIPPED | OUTPATIENT
Start: 2022-06-20

## 2022-06-20 NOTE — PROGRESS NOTES
FAMILY PRACTICE OFFICE VISIT       NAME: Brandon Diaz  AGE: 61 y o  SEX: female       : 1955        MRN: [id number]        Assessment and Plan     1  Encounter for wellness examination in adult    2  Depression with anxiety  Assessment & Plan:  eey twitching    start mag axide   start Valearian      3  Primary hypertension  Assessment & Plan:  Blood pressure is well controlled on regimen of metoprolol and Dyazide    Orders:  -     triamterene-hydrochlorothiazide (DYAZIDE) 37 5-25 mg per capsule; Take 1 capsule by mouth every morning    4  Myokymia  Assessment & Plan:  Likely stress related  Patient will try magnesium oxide and valerian root capsules over-the-counter  She will contact me if symptoms are not improved  We discussed importance of relaxation techniques, stress reduction, regular low-impact exercise      5  Colon cancer screening  -     Ambulatory referral to Colorectal Surgery; Future    6  Gastroesophageal reflux disease, unspecified whether esophagitis present  Assessment & Plan:  Symptoms are well controlled, continue rabeprazole p r n  7  Elevated ALT measurement  Assessment & Plan:  Mild elevation of ALT at 66, rest of hepatic function panel is normal     Patient denies symptoms of right upper quadrant abdominal pain, nausea, vomiting or dyspepsia  Recheck labs in 3 months  Orders:  -     Hepatic function panel; Future    8  Hypertension, unspecified type  Assessment & Plan:  Blood pressure is well controlled on regimen of metoprolol and Dyazide    Orders:  -     metoprolol succinate (TOPROL-XL) 25 mg 24 hr tablet; Take 1 tablet (25 mg total) by mouth daily    9  Gastroesophageal reflux disease without esophagitis  Assessment & Plan:  Symptoms are well controlled, continue rabeprazole p r n     Orders:  -     RABEprazole (ACIPHEX) 20 MG tablet; Take 1 tablet (20 mg total) by mouth daily    10  Dyslipidemia  Assessment & Plan:  Cholesterol levels have improved    I commended patient on dietary/lifestyle changes        Patient presents for follow-up/annual well exam   Assessment and plan as outlined above  Repeat blood work in 3 months  BMI Counseling: Body mass index is 31 02 kg/m²  The BMI is above normal  Nutrition recommendations include encouraging healthy choices of fruits and vegetables, consuming healthier snacks, moderation in carbohydrate intake, reducing intake of saturated and trans fat and reducing intake of cholesterol  Exercise recommendations include exercising 3-5 times per week  No pharmacotherapy was ordered  Patient referred to PCP  Rationale for BMI follow-up plan is due to patient being overweight or obese  There are no Patient Instructions on file for this visit  Return in about 1 year (around 6/20/2023) for Annual physical/well exam     Discussed with the patient and all questioned fully answered  She will call me if any problems arise  M*Modal software was used to dictate this note  It may contain errors with dictating incorrect words/spelling  Please contact provider directly with any questions  Chief Complaint     Chief Complaint   Patient presents with    Follow-up     6 month    fluttering eye lids     X few months  Intermittently-during vacation- left eye does happen in right eye as well   Care Gap Colonoscopy     Will call Dr BELL for an appt  History of Present Illness     Annual well exam      Left big toe pain,, intermittent, mostly after wearing flats and new sneakers  Better now, uses Aleve PRN  No joint erythema  No other toes are affected  Patient admits to intermittent symptoms of eye lid twitching within past few months  Painless  No visual changes  No headaches  She has been under significant stress, patient lost mother and father in [facility] within past few months  UTD with mammo, had fast breast MRI 3/2022   UTD with DR Connor, last appointment December 21    No RUQ pain or N/V D    Reflux symptoms are well controlled  Reviewed results of recent blood work which is overall normal/stable  LDL was 145, it is now down to 122  Total cholesterol came down from 225 to 212  ALT is mildly elevated at 66, reminder of hepatic function panel is normal   Patient is asymptomatic    Patient remains on daily medications for hypertension  Review of Systems   Review of Systems   Constitutional: Negative  HENT: Negative  Eyes: Negative  Respiratory: Negative  Cardiovascular: Negative  Gastrointestinal: Negative  Endocrine: Negative  Genitourinary: Negative  Musculoskeletal: Positive for arthralgias  Skin: Negative  Allergic/Immunologic: Negative  Neurological: Negative  Intermittent eyelid twitching   Hematological: Negative      Psychiatric/Behavioral:        Under stress, grieving       Active Problem List     Patient Active Problem List   Diagnosis    Gastroesophageal reflux disease    Insomnia    History of colon polyps    Primary hypertension    Depression with anxiety    Dyslipidemia    Chronic midline low back pain with right-sided sciatica    History of lobular carcinoma in situ (LCIS) of breast    Increased risk of breast cancer    Myokymia    Elevated ALT measurement       Past Medical History:  Past Medical History:   Diagnosis Date    Arthritis     Atypical lobular hyperplasia (ALH) of breast 2018    10/23/2018 right lumpectomy  Foci of lobular neoplasia (ALH/LCIS)    Chest pain     Colon polyps     GERD (gastroesophageal reflux disease)      2 para 2     Hypertension     Irritable bowel syndrome     Papanicolaou smear 11/15/2018    NEG    Post-menopausal     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        Past Surgical History:  Past Surgical History:   Procedure Laterality Date    BREAST BIOPSY Right 2018    BREAST LUMPECTOMY Right 10/2018    lobular carcinoma in situ    CARPAL TUNNEL RELEASE Left     CTR    COLONOSCOPY  03/2013    repeat in 5 yrs,  Dr Colleen Mcfarlane COLONOSCOPY  06/12/2019    ESOPHAGOGASTRIC FUNDOPLASTY      Nissen fundoplication    ESOPHAGOGASTRODUODENOSCOPY      FOOT SURGERY Left     plantar fascia     FUNDOPLICATION TRANSORAL      GYNECOLOGIC CRYOSURGERY  1995    HERNIA REPAIR  2010    MAMMO (HISTORICAL) Bilateral 08/28/2018    check right    MAMMO NEEDLE LOCALIZATION RIGHT (ALL INC) Right 10/23/2018    MAMMO STEREOTACTIC BREAST BIOPSY RIGHT (ALL INC) Right 8/29/2018    POLYPECTOMY  2008    ulcerated polypectomy     IN PERQ DEVICE PLACEMT BREAST LOC 1ST LES W GUIDNCE Right 10/23/2018    Procedure: BREAST LUMPECTOMY; BREAST NEEDLE LOCATION (NEEDLE LOC AT 0830) ;   Surgeon: Ramin Thornton MD;  [facility];  Service: Surgical Oncology    TONSILLECTOMY AND ADENOIDECTOMY      TUBAL LIGATION      VAGINAL DELIVERY      1980, 1982       Family History:  Family History   Problem Relation Age of Onset    Diabetes Mother     Heart disease Mother     Osteoporosis Mother     Hypertension Mother     Heart disease Father     Skin cancer Father     Hyperlipidemia Father     Cancer Family     Breast cancer Paternal Aunt         unknown age   [place] No Known Problems Sister     No Known Problems Daughter     No Known Problems Maternal Aunt     No Known Problems Paternal Aunt     Breast cancer Maternal Grandmother         unknonw age   [place] Cervical cancer Paternal Grandmother         unknown age   [place] No Known Problems Maternal Grandfather     No Known Problems Paternal Grandfather     No Known Problems Maternal Aunt     No Known Problems Maternal Aunt     No Known Problems Paternal Aunt        Social History:  Social History     Socioeconomic History    Marital status: /Civil Union     Spouse name: Not on file    Number of children: 2    Years of education: Not on file    Highest education level: Not on file   Occupational History    Not on file   Tobacco Use    Smoking status: Never Smoker    Smokeless tobacco: Never Used   Vaping Use    Vaping Use: Never used   Substance and Sexual Activity    Alcohol use: Yes     Comment: socially    Drug use: No    Sexual activity: Not Currently     Partners: Male     Birth control/protection: Post-menopausal   Other Topics Concern    Not on file   Social History Narrative    Not on file     Social Determinants of Health     Financial Resource Strain: Not on file   Food Insecurity: Not on file   Transportation Needs: Not on file   Physical Activity: Not on file   Stress: Not on file   Social Connections: Not on file   Intimate Partner Violence: Not on file   Housing Stability: Not on file         Objective     Vitals:    06/20/22 1012   BP: 120/70   BP Location: Left arm   Patient Position: Sitting   Cuff Size: Standard   Pulse: 68   Resp: 16   Temp: 98 2 °F (36 8 °C)   TempSrc: Temporal   SpO2: 98%   Weight: 87 5 kg (193 lb)   Height: 5' 6 14" (1 68 m)       Wt Readings from Last 3 Encounters:   06/20/22 87 5 kg (193 lb)   05/12/22 85 3 kg (188 lb)   12/20/21 84 4 kg (186 lb)       Physical Exam  Vitals and nursing note reviewed  Constitutional:       General: She is not in acute distress  Appearance: Normal appearance  She is well-developed  She is not ill-appearing  HENT:      Head: Normocephalic and atraumatic  Eyes:      General: No scleral icterus  Extraocular Movements: Extraocular movements intact  Conjunctiva/sclera: Conjunctivae normal    Neck:      Thyroid: No thyromegaly  Vascular: No carotid bruit  Cardiovascular:      Rate and Rhythm: Normal rate and regular rhythm  Heart sounds: Normal heart sounds  No murmur heard  Pulmonary:      Effort: Pulmonary effort is normal  No respiratory distress  Breath sounds: Normal breath sounds  No wheezing  Abdominal:      General: Bowel sounds are normal  There is no distension or abdominal bruit  Palpations: Abdomen is soft  Tenderness:  There is no abdominal tenderness  Hernia: No hernia is present  Musculoskeletal:         General: Normal range of motion  Cervical back: Neck supple  No rigidity  Right lower leg: No edema  Left lower leg: No edema  Skin:     General: Skin is warm  Neurological:      General: No focal deficit present  Mental Status: She is alert and oriented to person, place, and time  Cranial Nerves: No cranial nerve deficit  Coordination: Coordination normal    Psychiatric:         Mood and Affect: Mood normal          Behavior: Behavior normal          Thought Content:  Thought content normal           Pertinent Laboratory/Diagnostic Studies:    Lab Results   Component Value Date    WBC 6 1 05/10/2016    HGB 14 4 05/10/2016    HCT 44 1 05/10/2016    MCV 95 4 05/10/2016     05/10/2016       No results found for: TSH    Lab Results   Component Value Date    CHOL 215 (H) 05/10/2016     Lab Results   Component Value Date    TRIG 89 05/10/2016     Lab Results   Component Value Date    HDL 58 05/10/2016     Lab Results   Component Value Date    LDLCALC 111 (H) 06/10/2015     No results found for: HGBA1C  Lab Results   Component Value Date    K 4 5 05/10/2016     05/10/2016    CO2 26 05/10/2016    ANIONGAP 5 06/10/2015    BUN 17 05/10/2016    CREATININE 0 75 05/10/2016    CALCIUM 9 9 05/10/2016    AST 19 05/10/2016    ALT 24 05/10/2016    ALKPHOS 50 05/10/2016    PROT 6 8 05/10/2016    BILITOT 0 5 05/10/2016       Orders Placed This Encounter   Procedures    Hepatic function panel    Ambulatory referral to Colorectal Surgery       ALLERGIES:  Allergies   Allergen Reactions    Ace Inhibitors Cough    Adhesive [Medical Tape] Itching       Current Medications     Current Outpatient Medications   Medication Sig Dispense Refill    Calcium-Magnesium-Vitamin D (CITRACAL CALCIUM+D PO) Take by mouth      Cholecalciferol (VITAMIN D) 2000 units CAPS Take 1 tablet by mouth daily      docusate sodium (COLACE) 100 mg capsule Take 1 capsule by mouth daily        fluticasone (FLONASE) 50 mcg/act nasal spray 2 sprays into each nostril daily      KRILL OIL PO Take 1,600 mg by mouth 2 capsules daily      Lido-Menthol-Methyl Sal-Camph (CBD KINGS EX) Apply topically      Magnesium Oxide 250 MG TABS Take 250 mg by mouth      metoprolol succinate (TOPROL-XL) 25 mg 24 hr tablet Take 1 tablet (25 mg total) by mouth daily 90 tablet 3    Multiple Vitamins-Minerals (WOMENS 50+ MULTI VITAMIN/MIN PO) Take by mouth      Probiotic Product (PROBIOTIC BLEND PO) Take 1 capsule by mouth Daily at 2am      RABEprazole (ACIPHEX) 20 MG tablet Take 1 tablet (20 mg total) by mouth daily 90 tablet 3    triamterene-hydrochlorothiazide (DYAZIDE) 37 5-25 mg per capsule Take 1 capsule by mouth every morning 90 capsule 3     No current facility-administered medications for this visit         Medications Discontinued During This Encounter   Medication Reason    triamterene-hydrochlorothiazide (DYAZIDE) 37 5-25 mg per capsule Reorder    metoprolol succinate (TOPROL-XL) 25 mg 24 hr tablet Reorder    RABEprazole (ACIPHEX) 20 MG tablet Reorder       Health Maintenance     Health Maintenance   Topic Date Due    Hepatitis C Screening  Never done    PT PLAN OF CARE  Never done    HIV Screening  Never done    DTaP,Tdap,and Td Vaccines (1 - Tdap) Never done    Colorectal Cancer Screening  06/12/2022    Influenza Vaccine (Season Ended) 09/01/2022    COVID-19 Vaccine (1) 09/20/2022 (Originally 11/27/1960)    BMI: Adult  06/20/2023    Annual Physical  06/20/2023    BMI: Followup Plan  06/26/2023    Breast Cancer Screening: Mammogram  09/09/2023    Cervical Cancer Screening  12/06/2026    Pneumococcal Vaccine: Pediatrics (0 to 5 Years) and At-Risk Patients (6 to 59 Years)  Aged Out    HIB Vaccine  Aged Out    Hepatitis B Vaccine  Aged Out    IPV Vaccine  Aged Out    Hepatitis A Vaccine  Aged Out    Meningococcal ACWY Vaccine Aged Out    HPV Vaccine  Aged Out       Immunization History   Administered Date(s) Administered    Influenza Quadrivalent Preservative Free 3 years and older IM 11/14/2016, 09/16/2017    Influenza, injectable, quadrivalent, preservative free 0 5 mL 10/26/2019, 09/12/2020    Influenza, recombinant, quadrivalent,injectable, preservative free 09/06/2018    Influenza, seasonal, injectable 10/09/2013, 09/15/2014, 10/02/2015       Josiane Devi MD

## 2022-06-26 PROBLEM — R74.01 ELEVATED ALT MEASUREMENT: Status: ACTIVE | Noted: 2022-06-26

## 2022-06-26 PROBLEM — G25.3 MYOKYMIA: Status: ACTIVE | Noted: 2022-06-26

## 2022-06-26 PROBLEM — Z86.000 HISTORY OF LOBULAR CARCINOMA IN SITU (LCIS) OF BREAST: Status: ACTIVE | Noted: 2020-09-10

## 2022-06-26 NOTE — ASSESSMENT & PLAN NOTE
Likely stress related  Patient will try magnesium oxide and valerian root capsules over-the-counter      She will contact me if symptoms are not improved  We discussed importance of relaxation techniques, stress reduction, regular low-impact exercise

## 2022-06-26 NOTE — ASSESSMENT & PLAN NOTE
Mild elevation of ALT at 66, rest of hepatic function panel is normal     Patient denies symptoms of right upper quadrant abdominal pain, nausea, vomiting or dyspepsia  Recheck labs in 3 months

## 2022-06-26 NOTE — ASSESSMENT & PLAN NOTE
Patient is scheduled for breast MRI and mammogram     She is considering sole mammogram screening going forward due to high cost     History of lumpectomy in 2018, Dr Claire Aaron

## 2022-07-13 DIAGNOSIS — M79.676 PAIN OF GREAT TOE, UNSPECIFIED LATERALITY: Primary | ICD-10-CM

## 2022-07-22 ENCOUNTER — PATIENT MESSAGE (OUTPATIENT)
Dept: FAMILY MEDICINE CLINIC | Facility: CLINIC | Age: 67
End: 2022-07-22

## 2022-07-25 DIAGNOSIS — M25.50 ARTHRALGIA OF MULTIPLE JOINTS: Primary | ICD-10-CM

## 2022-07-25 DIAGNOSIS — R76.8 POSITIVE ANA (ANTINUCLEAR ANTIBODY): ICD-10-CM

## 2022-09-08 ENCOUNTER — TELEPHONE (OUTPATIENT)
Dept: ADMINISTRATIVE | Facility: OTHER | Age: 67
End: 2022-09-08

## 2022-09-08 NOTE — LETTER
Procedure Request Form: Colonoscopy      Date Requested: 22  Patient: Cassandrileti Profit  Patient : 1955   Referring Provider: Moustapha Sheets, MD        Date of Procedure ________2022 report______________________       The above patient has informed us that they have completed their   most recent Colonoscopy at your facility  Please complete   this form and attach all corresponding procedure reports/results  Comments __________________________________________________________  ____________________________________________________________________  ____________________________________________________________________  ____________________________________________________________________    Facility Completing Procedure _________________________________________    Form Completed By (print name) _______________________________________      Signature __________________________________________________________      These reports are needed for  compliance  Please fax this completed form and a copy of the procedure report to our office located at [place] [address] as soon as possible to [phone] attention Pat Akaska: Phone [phone]    We thank you for your assistance in treating our mutual patient

## 2022-09-08 NOTE — LETTER
Procedure Request Form: Colonoscopy      Date Requested: 22  Patient: Sheldon Hinds  Patient : 1955   Referring Provider: Bereket Jimenez MD   2nd Request        Date of Procedure _______2022 report_______________________       The above patient has informed us that they have completed their   most recent Colonoscopy at your facility  Please complete   this form and attach all corresponding procedure reports/results  Comments __________________________________________________________  ____________________________________________________________________  ____________________________________________________________________  ____________________________________________________________________    Facility Completing Procedure _________________________________________    Form Completed By (print name) _______________________________________      Signature __________________________________________________________      These reports are needed for  compliance  Please fax this completed form and a copy of the procedure report to our office located at [place] [address] as soon as possible to [phone] cheryl Gordon: Phone [phone]    We thank you for your assistance in treating our mutual patient

## 2022-09-08 NOTE — TELEPHONE ENCOUNTER
----- Message from Kayli Chase sent at 9/7/2022  5:15 PM EDT -----  Regarding: care gap request-Colonoscopy  09/07/22 5:15 PM    Hello, our patient attached above has had CRC: Colonoscopy completed/performed  Please assist in updating the patient chart by making an External outreach to [facility], facility located in [place], [place] #: [phone], Fax #: [phone]  The date of service is 09/07/2022, by Dr Mary Bernal       Thank you,  Rustam Chilel MA  [facility]

## 2022-09-08 NOTE — TELEPHONE ENCOUNTER
Upon review of the In Basket request and the patient's chart, initial outreach has been made via fax, please see Contacts section for details       Thank you  Martín De Souza, [facility]  [phone] Fax [phone]

## 2022-09-12 ENCOUNTER — HOSPITAL ENCOUNTER (OUTPATIENT)
Dept: RADIOLOGY | Age: 67
Discharge: HOME | End: 2022-09-12
Payer: COMMERCIAL

## 2022-09-12 VITALS — HEIGHT: 66 IN | WEIGHT: 190 LBS | BODY MASS INDEX: 30.53 KG/M2

## 2022-09-12 DIAGNOSIS — Z12.31 ENCOUNTER FOR SCREENING MAMMOGRAM FOR MALIGNANT NEOPLASM OF BREAST: ICD-10-CM

## 2022-09-12 PROCEDURE — 77067 SCR MAMMO BI INCL CAD: CPT

## 2022-09-12 PROCEDURE — 77063 BREAST TOMOSYNTHESIS BI: CPT

## 2022-09-13 NOTE — TELEPHONE ENCOUNTER
As a follow-up, a second attempt has been made for outreach via fax, please see Contacts section for details      Thank you  Marisol Cowan, [address] [address]  [phone] Fax [phone]

## 2022-09-14 NOTE — TELEPHONE ENCOUNTER
Upon review of the In Basket request we were able to locate, review, and update the patient chart as requested for CRC: Colonoscopy  Any additional questions or concerns should be emailed to the Practice Liaisons via [email or web address] email, please do not reply via In Basket      Thank you  Kayli Appiah

## 2022-10-05 ENCOUNTER — VBI (OUTPATIENT)
Dept: ADMINISTRATIVE | Facility: OTHER | Age: 67
End: 2022-10-05

## 2022-11-10 DIAGNOSIS — I10 HYPERTENSION, UNSPECIFIED TYPE: ICD-10-CM

## 2022-11-10 RX ORDER — METOPROLOL SUCCINATE 25 MG/1
25 TABLET, EXTENDED RELEASE ORAL DAILY
Qty: 90 TABLET | Refills: 0 | Status: SHIPPED | OUTPATIENT
Start: 2022-11-10

## 2022-11-14 ENCOUNTER — ANNUAL EXAM (OUTPATIENT)
Dept: GYNECOLOGY | Facility: CLINIC | Age: 67
End: 2022-11-14

## 2022-11-14 VITALS
DIASTOLIC BLOOD PRESSURE: 80 MMHG | HEIGHT: 66 IN | WEIGHT: 195 LBS | BODY MASS INDEX: 31.34 KG/M2 | SYSTOLIC BLOOD PRESSURE: 140 MMHG

## 2022-11-14 DIAGNOSIS — Z01.419 ENCOUNTER FOR ANNUAL ROUTINE GYNECOLOGICAL EXAMINATION: Primary | ICD-10-CM

## 2022-11-14 NOTE — PROGRESS NOTES
Assessment/Plan:    Normal breast exam  Normal fast breast MRI and normal mammogram   Normal gyn exam  History of lobular carcinoma in Situ  Normal Pap smear negative HPV 2021  Colonoscopy with polyp 2022  Due for repeat at 5 years    Plan:  Follow-up with breast surgeon  Subjective:      Patient ID: Bruno Aguila is a 61 y o  female continue healthy diet and exercise   presents for yearly examination with no complaints  Patient denies any breast problems  Denies any pelvic pain or vaginal bleeding  Denies any bowel or bladder problems  Had a colonoscopy 2022 that showed a polyp  Due for repeat at 5 years  Patient is followed for breast cancer by her breast surgeon  She has both MRIs and mammograms  No change in family history  Medications reviewed        Review of Systems   Constitutional: Negative  Negative for fatigue, fever and unexpected weight change  HENT: Negative  Eyes: Negative  Respiratory: Negative  Negative for chest tightness, shortness of breath, wheezing and stridor  Cardiovascular: Negative  Negative for chest pain, palpitations and leg swelling  Gastrointestinal: Negative  Negative for abdominal pain, blood in stool, diarrhea, nausea, rectal pain and vomiting  Endocrine: Negative  Genitourinary: Negative for dysuria, frequency, vaginal bleeding, vaginal discharge and vaginal pain  Musculoskeletal: Negative  Skin: Negative  Allergic/Immunologic: Negative  Neurological: Negative  Hematological: Negative  Psychiatric/Behavioral: Negative  All other systems reviewed and are negative  Objective:      /80   Ht 5' 6" (1 676 m)   Wt 88 5 kg (195 lb)   LMP  (LMP Unknown)   BMI 31 47 kg/m²          Physical Exam  Constitutional:       Appearance: She is well-developed  HENT:      Head: Normocephalic and atraumatic  Neck:      Thyroid: No thyromegaly  Trachea: No tracheal deviation  Cardiovascular:      Rate and Rhythm: Normal rate and regular rhythm  Heart sounds: Normal heart sounds  Pulmonary:      Effort: Pulmonary effort is normal  No respiratory distress  Breath sounds: Normal breath sounds  No stridor  No wheezing or rales  Chest:      Chest wall: No tenderness  Breasts: Breasts are symmetrical       Right: No inverted nipple, mass, nipple discharge, skin change or tenderness  Left: No inverted nipple, mass, nipple discharge, skin change or tenderness  Abdominal:      General: Bowel sounds are normal  There is no distension  Palpations: Abdomen is soft  There is no mass  Tenderness: There is no abdominal tenderness  There is no guarding or rebound  Hernia: No hernia is present  There is no hernia in the left inguinal area  Genitourinary:     Labia:         Right: No rash, tenderness, lesion or injury  Left: No rash, tenderness, lesion or injury  Vagina: Normal  No signs of injury and foreign body  No vaginal discharge, erythema, tenderness or bleeding  Cervix: No cervical motion tenderness, discharge or friability  Uterus: Not deviated, not enlarged, not fixed and not tender  Adnexa:         Right: No mass, tenderness or fullness  Left: No mass, tenderness or fullness  Rectum: No mass, anal fissure, external hemorrhoid or internal hemorrhoid  Musculoskeletal:      Cervical back: Normal range of motion and neck supple  Lymphadenopathy:      Lower Body: No right inguinal adenopathy  No left inguinal adenopathy  Skin:     General: Skin is warm and dry  Neurological:      Mental Status: She is alert and oriented to person, place, and time  Psychiatric:         Behavior: Behavior normal          Thought Content:  Thought content normal          Judgment: Judgment normal

## 2022-12-04 ENCOUNTER — RA CDI HCC (OUTPATIENT)
Dept: OTHER | Facility: HOSPITAL | Age: 67
End: 2022-12-04

## 2022-12-04 NOTE — PROGRESS NOTES
[address]  coding opportunities       Chart reviewed, no opportunity found: CHART REVIEWED, NO OPPORTUNITY FOUND        Patients Insurance     Medicare Insurance: Medicare

## 2022-12-12 ENCOUNTER — OFFICE VISIT (OUTPATIENT)
Dept: FAMILY MEDICINE CLINIC | Facility: CLINIC | Age: 67
End: 2022-12-12

## 2022-12-12 VITALS
HEART RATE: 61 BPM | TEMPERATURE: 97.8 F | DIASTOLIC BLOOD PRESSURE: 84 MMHG | OXYGEN SATURATION: 98 % | BODY MASS INDEX: 31.4 KG/M2 | SYSTOLIC BLOOD PRESSURE: 138 MMHG | WEIGHT: 195.4 LBS | RESPIRATION RATE: 16 BRPM | HEIGHT: 66 IN

## 2022-12-12 DIAGNOSIS — Z00.00 MEDICARE ANNUAL WELLNESS VISIT, INITIAL: Primary | ICD-10-CM

## 2022-12-12 DIAGNOSIS — E78.5 DYSLIPIDEMIA: Chronic | ICD-10-CM

## 2022-12-12 DIAGNOSIS — I10 PRIMARY HYPERTENSION: ICD-10-CM

## 2022-12-12 DIAGNOSIS — K76.0 FATTY LIVER: ICD-10-CM

## 2022-12-12 DIAGNOSIS — B35.1 ONYCHOMYCOSIS: ICD-10-CM

## 2022-12-12 DIAGNOSIS — K21.9 GASTROESOPHAGEAL REFLUX DISEASE WITHOUT ESOPHAGITIS: Chronic | ICD-10-CM

## 2022-12-12 NOTE — ASSESSMENT & PLAN NOTE
Metoprolol ER 50 mg daily  Patient reports significant improvement of generalized myalgias and arthralgias after discontinuation of Dyazide  Previous intolerance to amlodipine and ARB's, reported cough on ACE inhibitor  Dose of metoprolol is essentially maxed out due to heart rate  Patient will continue monitoring blood pressures at home and will update me in 4 to 6 weeks  If blood pressures are still suboptimal-may consider switching metoprolol to Bystolic or possibly retrying low-dose amlodipine

## 2022-12-12 NOTE — ASSESSMENT & PLAN NOTE
Minor ALT elevation 58 , improved compared to previous testing  US 2017 -fatty liver  Patient denies symptoms of abdominal pain, nausea, vomiting or dyspepsia

## 2022-12-12 NOTE — PROGRESS NOTES
Assessment and Plan:     Problem List Items Addressed This Visit        Digestive    Gastroesophageal reflux disease (Chronic)     Aciphex therapy, symptoms have been well controlled  OK to skip every other day and observe         Fatty liver     Minor ALT elevation 58 , improved compared to previous testing  US 2017 -fatty liver  Patient denies symptoms of abdominal pain, nausea, vomiting or dyspepsia  Relevant Orders    Comprehensive metabolic panel       Cardiovascular and Mediastinum    Primary hypertension     Metoprolol ER 50 mg daily  Patient reports significant improvement of generalized myalgias and arthralgias after discontinuation of Dyazide  Previous intolerance to amlodipine and ARB's, reported cough on ACE inhibitor  Dose of metoprolol is essentially maxed out due to heart rate  Patient will continue monitoring blood pressures at home and will update me in 4 to 6 weeks  If blood pressures are still suboptimal-may consider switching metoprolol to Bystolic or possibly retrying low-dose amlodipine  Other    Dyslipidemia (Chronic)    Relevant Orders    CBC and differential    Lipid Panel with Direct LDL reflex    TSH, 3rd generation   Other Visit Diagnoses     Medicare annual wellness visit, initial    -  Primary    Onychomycosis        Relevant Medications    ciclopirox (PENLAC) 8 % solution        Follow-up chronic medical conditions/Medicare wellness  Patient declines Prevnar 20 and bone density scan  She has been feeling generally well  Tony routine blood work in June  Await for blood pressure updates in 4 to 6 weeks  Preventive health issues were discussed with patient, and age appropriate screening tests were ordered as noted in patient's After Visit Summary  Personalized health advice and appropriate referrals for health education or preventive services given if needed, as noted in patient's After Visit Summary       History of Present Illness:     Patient presents for a Medicare Wellness Visit    Feels significantly better being Off Dyazide, improved symptoms of myalgias and arthralgias  Last set of labs  9/26/22-ALT -was down to 58 ( normal is 56)  Devious right upper quadrant ultrasound revealed fatty liver  No GI symptoms    Reports to difficulty exercising/walking due to chronic foot pain  She has been feeling generally well and offers no complaints of chest pain, palpitations, shortness of breath or dizziness  Recent extensive work-up for connective tissue disease was negative  Patient is up-to-date with health screenings (mammogram, GYN exam, colonoscopy)  Thickening discoloration of few toenails left foot  Patient Care Team:  Leann Pascual MD as PCP - Wing Clancy MD as PCP - PCP-MD Vadim Savage MD (Colon and Rectal Surgery)  Selena Roper MD as Surgeon (Surgical Oncology)  Jesus Denny MD (Obstetrics and Gynecology)     Review of Systems:     Review of Systems   Constitutional: Negative  Eyes: Negative  Respiratory: Negative  Cardiovascular: Negative  Gastrointestinal: Negative  Endocrine: Negative  Genitourinary: Negative  Musculoskeletal: Positive for arthralgias  Allergic/Immunologic: Negative  Neurological: Negative  Hematological: Negative  Psychiatric/Behavioral: Positive for sleep disturbance (chronic)          Problem List:     Patient Active Problem List   Diagnosis   • Gastroesophageal reflux disease   • Insomnia   • History of colon polyps   • Primary hypertension   • Depression with anxiety   • Dyslipidemia   • Chronic midline low back pain with right-sided sciatica   • History of lobular carcinoma in situ (LCIS) of breast   • Increased risk of breast cancer   • Myokymia   • Fatty liver      Past Medical and Surgical History:     Past Medical History:   Diagnosis Date   • Arthritis    • Atypical lobular hyperplasia [facility]) of breast 2018    10/23/2018 right lumpectomy  Foci of lobular neoplasia (ALH/LCIS)   • Chest pain    • Colon polyps    • GERD (gastroesophageal reflux disease)    •  2 para 2    • Hypertension    • Irritable bowel syndrome    • Papanicolaou smear 11/15/2018    NEG   • Post-menopausal    • 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      Past Surgical History:   Procedure Laterality Date   • BREAST BIOPSY Right 2018   • BREAST LUMPECTOMY Right 10/2018    lobular carcinoma in situ   • CARPAL TUNNEL RELEASE Left     CTR   • COLONOSCOPY  2013    repeat in 5 yrs,  Dr Benji Mccoy   • COLONOSCOPY  2019   • ESOPHAGOGASTRIC FUNDOPLASTY      Nissen fundoplication   • ESOPHAGOGASTRODUODENOSCOPY     • FOOT SURGERY Left     plantar fascia    • FUNDOPLICATION TRANSORAL     • GYNECOLOGIC CRYOSURGERY     • HERNIA REPAIR     • MAMMO (HISTORICAL) Bilateral 2018    check right   • MAMMO NEEDLE LOCALIZATION RIGHT (ALL INC) Right 10/23/2018   • MAMMO STEREOTACTIC BREAST BIOPSY RIGHT (ALL INC) Right 2018   • POLYPECTOMY  2008    ulcerated polypectomy    • ID PERQ DEVICE PLACEMT BREAST LOC 1ST LES W GUIDNCE Right 10/23/2018    Procedure: BREAST LUMPECTOMY; BREAST NEEDLE LOCATION (NEEDLE LOC AT 0830) ;   Surgeon: Rhonda Cohen MD;  Location: [facility];  Service: Surgical Oncology   • TONSILLECTOMY AND ADENOIDECTOMY     • TUBAL LIGATION     • VAGINAL DELIVERY      ,       Family History:     Family History   Problem Relation Age of Onset   • Diabetes Mother    • Heart disease Mother    • Osteoporosis Mother    • Hypertension Mother    • Heart disease Father    • Skin cancer Father    • Hyperlipidemia Father    • Cancer Family    • Breast cancer Paternal Aunt         unknown age   • No Known Problems Sister    • No Known Problems Daughter    • No Known Problems Maternal Aunt    • No Known Problems Paternal Aunt    • Breast cancer Maternal Grandmother         unknonw age   • Cervical cancer Paternal Grandmother         unknown age   • No Known Problems Maternal Grandfather    • No Known Problems Paternal Grandfather    • No Known Problems Maternal Aunt    • No Known Problems Maternal Aunt    • No Known Problems Paternal Aunt       Social History:     Social History     Socioeconomic History   • Marital status: /Civil Union     Spouse name: None   • Number of children: 2   • Years of education: None   • Highest education level: None   Occupational History   • None   Tobacco Use   • Smoking status: Never   • Smokeless tobacco: Never   Vaping Use   • Vaping Use: Never used   Substance and Sexual Activity   • Alcohol use: Yes     Alcohol/week: 2 0 standard drinks     Types: 1 Glasses of wine, 1 Cans of beer per week     Comment: socially   • Drug use: No   • Sexual activity: Not Currently     Partners: Male     Birth control/protection: Post-menopausal   Other Topics Concern   • None   Social History Narrative   • None     Social Determinants of Health     Financial Resource Strain: Low Risk    • Difficulty of Paying Living Expenses: Not very hard   Food Insecurity: Not on file   Transportation Needs: No Transportation Needs   • Lack of Transportation (Medical): No   • Lack of Transportation (Non-Medical):  No   Physical Activity: Not on file   Stress: Not on file   Social Connections: Not on file   Intimate Partner Violence: Not on file   Housing Stability: Not on file      Medications and Allergies:     Current Outpatient Medications   Medication Sig Dispense Refill   • Calcium-Magnesium-Vitamin D (CITRACAL CALCIUM+D PO) Take by mouth     • Cholecalciferol (VITAMIN D) 2000 units CAPS Take 1 tablet by mouth daily     • ciclopirox (PENLAC) 8 % solution Apply once a day to affected toenails/fingernails, remove once a week with alcohol, then start all over 6 6 mL 5   • docusate sodium (COLACE) 100 mg capsule Take 1 capsule by mouth daily       • fluticasone (FLONASE) 50 mcg/act nasal spray 2 sprays into each nostril daily     • KRILL OIL PO Take 1,600 mg by mouth 2 capsules daily     • Lido-Menthol-Methyl Sal-Camph (CBD KINGS EX) Apply topically     • Magnesium Oxide 250 MG TABS Take 250 mg by mouth     • metoprolol succinate (TOPROL-XL) 25 mg 24 hr tablet Take 1 tablet (25 mg total) by mouth daily 90 tablet 0   • Multiple Vitamins-Minerals (WOMENS 50+ MULTI VITAMIN/MIN PO) Take by mouth     • Probiotic Product (PROBIOTIC BLEND PO) Take 1 capsule by mouth Daily at 2am     • RABEprazole (ACIPHEX) 20 MG tablet Take 1 tablet (20 mg total) by mouth daily 90 tablet 3     No current facility-administered medications for this visit  Allergies   Allergen Reactions   • Dyazide [Hydrochlorothiazide W-Triamterene] Other (See Comments)     myalgias   • Ace Inhibitors Cough   • Adhesive [Medical Tape] Itching      Immunizations:     Immunization History   Administered Date(s) Administered   • Influenza Quadrivalent Preservative Free 3 years and older IM 11/14/2016, 09/16/2017   • Influenza, injectable, quadrivalent, preservative free 0 5 mL 10/26/2019, 09/12/2020   • Influenza, recombinant, quadrivalent,injectable, preservative free 09/06/2018   • Influenza, seasonal, injectable 10/09/2013, 09/15/2014, 10/02/2015      Health Maintenance:         Topic Date Due   • Hepatitis C Screening  Never done   • HIV Screening  Never done   • Breast Cancer Screening: Mammogram  09/12/2024   • Cervical Cancer Screening  12/06/2026   • Colorectal Cancer Screening  09/07/2027         Topic Date Due   • Hepatitis B Vaccine (1 of 3 - 3-dose series) Never done   • COVID-19 Vaccine (1) Never done   • Pneumococcal Vaccine: 65+ Years (1 - PCV) Never done      Medicare Screening Tests and Risk Assessments:     Floyd Saldivar is here for her Initial Wellness visit  Health Risk Assessment:   Patient rates overall health as very good  Patient feels that their physical health rating is much better   Patient is satisfied with their life  Kamila Hole was rated as same  Hearing was rated as same  Patient feels that their emotional and mental health rating is slightly better  Patients states they are never, rarely angry  Patient states they are sometimes unusually tired/fatigued  Pain experienced in the last 7 days has been some  Patient's pain rating has been 3/10  Patient states that she has experienced no weight loss or gain in last 6 months  Depression Screening:   PHQ-9 Score: 2      Fall Risk Screening: In the past year, patient has experienced: no history of falling in past year      Urinary Incontinence Screening:   Patient has leaked urine accidently in the last six months  Home Safety:  Patient does not have trouble with stairs inside or outside of their home  Patient has working smoke alarms and has no working carbon monoxide detector  Home safety hazards include: none  Nutrition:   Current diet is Regular  Medications:   Patient is currently taking over-the-counter supplements  OTC medications include: Krill oil, calcium, vitamin D3, multi vitamin, stool softner, magnesium, colon health, super beets, CBD, zinc  Patient is able to manage medications  Activities of Daily Living (ADLs)/Instrumental Activities of Daily Living (IADLs):   Walk and transfer into and out of bed and chair?: Yes  Dress and groom yourself?: Yes    Bathe or shower yourself?: Yes    Feed yourself? Yes  Do your laundry/housekeeping?: Yes  Manage your money, pay your bills and track your expenses?: Yes  Make your own meals?: Yes    Do your own shopping?: Yes    Durable Medical Equipment Suppliers  N/A    Previous Hospitalizations:   Any hospitalizations or ED visits within the last 12 months?: No      Advance Care Planning:   Living will: Yes    Durable POA for healthcare:  Yes    Advanced directive: Yes      Cognitive Screening:   Provider or family/friend/caregiver concerned regarding cognition?: No    PREVENTIVE SCREENINGS      Cardiovascular Screening:    General: Risks and Benefits Discussed and Screening Current      Diabetes Screening:     General: Risks and Benefits Discussed and Screening Current      Colorectal Cancer Screening:     General: Screening Current      Breast Cancer Screening:     General: Screening Current      Cervical Cancer Screening:    General: Screening Not Indicated and Screening Current      Osteoporosis Screening:    General: Risks and Benefits Discussed and Patient Declines      Abdominal Aortic Aneurysm (AAA) Screening:        General: Risks and Benefits Discussed and Screening Not Indicated      Lung Cancer Screening:     General: Screening Not Indicated      Hepatitis C Screening:    General: Screening Not Indicated    Screening, Brief Intervention, and Referral to Treatment (SBIRT)    Screening  Typical number of drinks in a day: 0  Typical number of drinks in a week: 0  Interpretation: Low risk drinking behavior  AUDIT-C Screenin) How often did you have a drink containing alcohol in the past year? 2 to 4 times a month  2) How many drinks did you have on a typical day when you were drinking in the past year? 1 to 2  3) How often did you have 6 or more drinks on one occasion in the past year? never    AUDIT-C Score: 2  Interpretation: Score 0-2 (female): Negative screen for alcohol misuse    Single Item Drug Screening:  How often have you used an illegal drug (including marijuana) or a prescription medication for non-medical reasons in the past year? never    Single Item Drug Screen Score: 0  Interpretation: Negative screen for possible drug use disorder    Brief Intervention  Alcohol & drug use screenings were reviewed  No concerns regarding substance use disorder identified  Other Counseling Topics:   Regular weightbearing exercise  No results found       Physical Exam:     /84   Pulse 61   Temp 97 8 °F (36 6 °C) (Temporal)   Resp 16   Ht 5' 6" (1 676 m)   Wt 88 6 kg (195 lb 6 4 oz)   LMP (LMP Unknown)   SpO2 98%   BMI 31 54 kg/m²     Physical Exam  Vitals and nursing note reviewed  Constitutional:       General: She is not in acute distress  Appearance: Normal appearance  She is well-developed  She is not ill-appearing  HENT:      Head: Normocephalic and atraumatic  Eyes:      General: No scleral icterus  Conjunctiva/sclera: Conjunctivae normal    Neck:      Vascular: No carotid bruit  Cardiovascular:      Rate and Rhythm: Normal rate and regular rhythm  Heart sounds: Normal heart sounds  No murmur heard  Pulmonary:      Effort: Pulmonary effort is normal  No respiratory distress  Breath sounds: Normal breath sounds  Abdominal:      General: Bowel sounds are normal  There is no abdominal bruit  Palpations: Abdomen is soft  Tenderness: There is no abdominal tenderness  Musculoskeletal:      Cervical back: Neck supple  No rigidity  Right lower leg: No edema  Left lower leg: No edema  Skin:     General: Skin is warm  Comments: Left big toenail is affected by onychomycosis   Neurological:      General: No focal deficit present  Mental Status: She is alert and oriented to person, place, and time  Psychiatric:         Mood and Affect: Mood normal          Behavior: Behavior normal          Thought Content:  Thought content normal           Joan Reddy MD

## 2022-12-12 NOTE — PATIENT INSTRUCTIONS
Medicare Preventive Visit Patient Instructions  Thank you for completing your Welcome to Medicare Visit or Medicare Annual Wellness Visit today  Your next wellness visit will be due in one year (12/13/2023)  The screening/preventive services that you may require over the next 5-10 years are detailed below  Some tests may not apply to you based off risk factors and/or age  Screening tests ordered at today's visit but not completed yet may show as past due  Also, please note that scanned in results may not display below  Preventive Screenings:  Service Recommendations Previous Testing/Comments   Colorectal Cancer Screening  * Colonoscopy    * Fecal Occult Blood Test (FOBT)/Fecal Immunochemical Test (FIT)  * Fecal DNA/Cologuard Test  * Flexible Sigmoidoscopy Age: 39-70 years old   Colonoscopy: every 10 years (may be performed more frequently if at higher risk)  OR  FOBT/FIT: every 1 year  OR  Cologuard: every 3 years  OR  Sigmoidoscopy: every 5 years  Screening may be recommended earlier than age 39 if at higher risk for colorectal cancer  Also, an individualized decision between you and your healthcare provider will decide whether screening between the ages of 74-80 would be appropriate  Colonoscopy: 09/07/2022  FOBT/FIT: Not on file  Cologuard: Not on file  Sigmoidoscopy: Not on file    Screening Current     Breast Cancer Screening Age: 36 years old  Frequency: every 1-2 years  Not required if history of left and right mastectomy Mammogram: 09/12/2022    Screening Current   Cervical Cancer Screening Between the ages of 21-29, pap smear recommended once every 3 years  Between the ages of 33-67, can perform pap smear with HPV co-testing every 5 years     Recommendations may differ for women with a history of total hysterectomy, cervical cancer, or abnormal pap smears in past  Pap Smear: 12/06/2021    Screening Not Indicated   Hepatitis C Screening Once for adults born between 1945 and 1965  More frequently in patients at high risk for Hepatitis C Hep C Antibody: Not on file        Diabetes Screening 1-2 times per year if you're at risk for diabetes or have pre-diabetes Fasting glucose: No results in last 5 years (No results in last 5 years)  A1C: No results in last 5 years (No results in last 5 years)      Cholesterol Screening Once every 5 years if you don't have a lipid disorder  May order more often based on risk factors  Lipid panel: Not on file          Other Preventive Screenings Covered by Medicare:  1  Abdominal Aortic Aneurysm (AAA) Screening: covered once if your at risk  You're considered to be at risk if you have a family history of AAA  2  Lung Cancer Screening: covers low dose CT scan once per year if you meet all of the following conditions: (1) Age 50-69; (2) No signs or symptoms of lung cancer; (3) Current smoker or have quit smoking within the last 15 years; (4) You have a tobacco smoking history of at least 20 pack years (packs per day multiplied by number of years you smoked); (5) You get a written order from a healthcare provider  3  Glaucoma Screening: covered annually if you're considered high risk: (1) You have diabetes OR (2) Family history of glaucoma OR (3)  aged 48 and older OR (3)  American aged 72 and older  3  Osteoporosis Screening: covered every 2 years if you meet one of the following conditions: (1) You're estrogen deficient and at risk for osteoporosis based off medical history and other findings; (2) Have a vertebral abnormality; (3) On glucocorticoid therapy for more than 3 months; (4) Have primary hyperparathyroidism; (5) On osteoporosis medications and need to assess response to drug therapy  · Last bone density test (DXA Scan): Not on file  5  HIV Screening: covered annually if you're between the age of 12-76  Also covered annually if you are younger than 13 and older than 72 with risk factors for HIV infection   For pregnant patients, it is covered up to 3 times per pregnancy  Immunizations:  Immunization Recommendations   Influenza Vaccine Annual influenza vaccination during flu season is recommended for all persons aged >= 6 months who do not have contraindications   Pneumococcal Vaccine   * Pneumococcal conjugate vaccine = PCV13 (Prevnar 13), PCV15 (Vaxneuvance), PCV20 (Prevnar 20)  * Pneumococcal polysaccharide vaccine = PPSV23 (Pneumovax) Adults 25-60 years old: 1-3 doses may be recommended based on certain risk factors  Adults 72 years old: 1-2 doses may be recommended based off what pneumonia vaccine you previously received   Hepatitis B Vaccine 3 dose series if at intermediate or high risk (ex: diabetes, end stage renal disease, liver disease)   Tetanus (Td) Vaccine - COST NOT COVERED BY MEDICARE PART B Following completion of primary series, a booster dose should be given every 10 years to maintain immunity against tetanus  Td may also be given as tetanus wound prophylaxis  Tdap Vaccine - COST NOT COVERED BY MEDICARE PART B Recommended at least once for all adults  For pregnant patients, recommended with each pregnancy  Shingles Vaccine (Shingrix) - COST NOT COVERED BY MEDICARE PART B  2 shot series recommended in those aged 48 and above     Health Maintenance Due:      Topic Date Due   • Hepatitis C Screening  Never done   • HIV Screening  Never done   • Breast Cancer Screening: Mammogram  09/12/2024   • Cervical Cancer Screening  12/06/2026   • Colorectal Cancer Screening  09/07/2027     Immunizations Due:      Topic Date Due   • Hepatitis B Vaccine (1 of 3 - 3-dose series) Never done   • COVID-19 Vaccine (1) Never done   • Pneumococcal Vaccine: 65+ Years (1 - PCV) Never done     Advance Directives   What are advance directives? Advance directives are legal documents that state your wishes and plans for medical care  These plans are made ahead of time in case you lose your ability to make decisions for yourself   Advance directives can apply to any medical decision, such as the treatments you want, and if you want to donate organs  What are the types of advance directives? There are many types of advance directives, and each state has rules about how to use them  You may choose a combination of any of the following:  · Living will: This is a written record of the treatment you want  You can also choose which treatments you do not want, which to limit, and which to stop at a certain time  This includes surgery, medicine, IV fluid, and tube feedings  · Durable power of  for healthcare [facility]): This is a written record that states who you want to make healthcare choices for you when you are unable to make them for yourself  This person, called a proxy, is usually a family member or a friend  You may choose more than 1 proxy  · Do not resuscitate (DNR) order:  A DNR order is used in case your heart stops beating or you stop breathing  It is a request not to have certain forms of treatment, such as CPR  A DNR order may be included in other types of advance directives  · Medical directive: This covers the care that you want if you are in a coma, near death, or unable to make decisions for yourself  You can list the treatments you want for each condition  Treatment may include pain medicine, surgery, blood transfusions, dialysis, IV or tube feedings, and a ventilator (breathing machine)  · Values history: This document has questions about your views, beliefs, and how you feel and think about life  This information can help others choose the care that you would choose  Why are advance directives important? An advance directive helps you control your care  Although spoken wishes may be used, it is better to have your wishes written down  Spoken wishes can be misunderstood, or not followed  Treatments may be given even if you do not want them  An advance directive may make it easier for your family to make difficult choices about your care  Urinary Incontinence   Urinary incontinence (UI)  is when you lose control of your bladder  UI develops because your bladder cannot store or empty urine properly  The 3 most common types of UI are stress incontinence, urge incontinence, or both  Medicines:   · May be given to help strengthen your bladder control  Report any side effects of medication to your healthcare provider  Do pelvic muscle exercises often:  Your pelvic muscles help you stop urinating  Squeeze these muscles tight for 5 seconds, then relax for 5 seconds  Gradually work up to squeezing for 10 seconds  Do 3 sets of 15 repetitions a day, or as directed  This will help strengthen your pelvic muscles and improve bladder control  Train your bladder:  Go to the bathroom at set times, such as every 2 hours, even if you do not feel the urge to go  You can also try to hold your urine when you feel the urge to go  For example, hold your urine for 5 minutes when you feel the urge to go  As that becomes easier, hold your urine for 10 minutes  Self-care:   · Keep a UI record  Write down how often you leak urine and how much you leak  Make a note of what you were doing when you leaked urine  · Drink liquids as directed  You may need to limit the amount of liquid you drink to help control your urine leakage  Do not drink any liquid right before you go to bed  Limit or do not have drinks that contain caffeine or alcohol  · Prevent constipation  Eat a variety of high-fiber foods  Good examples are high-fiber cereals, beans, vegetables, and whole-grain breads  Walking is the best way to trigger your intestines to have a bowel movement  · Exercise regularly and maintain a healthy weight  Weight loss and exercise will decrease pressure on your bladder and help you control your leakage  · Use a catheter as directed  to help empty your bladder  A catheter is a tiny, plastic tube that is put into your bladder to drain your urine     · Go to behavior therapy as directed  Behavior therapy may be used to help you learn to control your urge to urinate  Weight Management   Why it is important to manage your weight:  Being overweight increases your risk of health conditions such as heart disease, high blood pressure, type 2 diabetes, and certain types of cancer  It can also increase your risk for osteoarthritis, sleep apnea, and other respiratory problems  Aim for a slow, steady weight loss  Even a small amount of weight loss can lower your risk of health problems  How to lose weight safely:  A safe and healthy way to lose weight is to eat fewer calories and get regular exercise  You can lose up about 1 pound a week by decreasing the number of calories you eat by 500 calories each day  Healthy meal plan for weight management:  A healthy meal plan includes a variety of foods, contains fewer calories, and helps you stay healthy  A healthy meal plan includes the following:  · Eat whole-grain foods more often  A healthy meal plan should contain fiber  Fiber is the part of grains, fruits, and vegetables that is not broken down by your body  Whole-grain foods are healthy and provide extra fiber in your diet  Some examples of whole-grain foods are whole-wheat breads and pastas, oatmeal, brown rice, and bulgur  · Eat a variety of vegetables every day  Include dark, leafy greens such as spinach, kale, salvador greens, and mustard greens  Eat yellow and orange vegetables such as carrots, sweet potatoes, and winter squash  · Eat a variety of fruits every day  Choose fresh or canned fruit (canned in its own juice or light syrup) instead of juice  Fruit juice has very little or no fiber  · Eat low-fat dairy foods  Drink fat-free (skim) milk or 1% milk  Eat fat-free yogurt and low-fat cottage cheese  Try low-fat cheeses such as mozzarella and other reduced-fat cheeses  · Choose meat and other protein foods that are low in fat    Choose beans or other legumes such as split peas or lentils  Choose fish, skinless poultry (chicken or turkey), or lean cuts of red meat (beef or pork)  Before you cook meat or poultry, cut off any visible fat  · Use less fat and oil  Try baking foods instead of frying them  Add less fat, such as margarine, sour cream, regular salad dressing and mayonnaise to foods  Eat fewer high-fat foods  Some examples of high-fat foods include french fries, doughnuts, ice cream, and cakes  · Eat fewer sweets  Limit foods and drinks that are high in sugar  This includes candy, cookies, regular soda, and sweetened drinks  Exercise:  Exercise at least 30 minutes per day on most days of the week  Some examples of exercise include walking, biking, dancing, and swimming  You can also fit in more physical activity by taking the stairs instead of the elevator or parking farther away from stores  Ask your healthcare provider about the best exercise plan for you  © Copyright [employer] 2018 Information is for End User's use only and may not be sold, redistributed or otherwise used for commercial purposes   All illustrations and images included in CareNotes® are the copyrighted property of A D A M , Inc  or [address] [place] [address]

## 2023-02-07 DIAGNOSIS — I10 HYPERTENSION, UNSPECIFIED TYPE: ICD-10-CM

## 2023-02-07 RX ORDER — METOPROLOL SUCCINATE 25 MG/1
25 TABLET, EXTENDED RELEASE ORAL DAILY
Qty: 90 TABLET | Refills: 0 | Status: SHIPPED | OUTPATIENT
Start: 2023-02-07

## 2023-03-15 ENCOUNTER — HOSPITAL ENCOUNTER (OUTPATIENT)
Dept: MRI IMAGING | Facility: HOSPITAL | Age: 68
Discharge: HOME | End: 2023-03-15

## 2023-03-15 DIAGNOSIS — Z91.89 INCREASED RISK OF BREAST CANCER: ICD-10-CM

## 2023-03-15 DIAGNOSIS — N60.99 ATYPICAL LOBULAR HYPERPLASIA (ALH) OF BREAST: ICD-10-CM

## 2023-03-15 RX ADMIN — GADOBUTROL 8 ML: 604.72 INJECTION INTRAVENOUS at 10:28

## 2023-05-02 DIAGNOSIS — I10 HYPERTENSION, UNSPECIFIED TYPE: ICD-10-CM

## 2023-05-02 RX ORDER — METOPROLOL SUCCINATE 25 MG/1
25 TABLET, EXTENDED RELEASE ORAL DAILY
Qty: 90 TABLET | Refills: 0 | Status: SHIPPED | OUTPATIENT
Start: 2023-05-02

## 2023-05-15 ENCOUNTER — OFFICE VISIT (OUTPATIENT)
Dept: SURGICAL ONCOLOGY | Facility: CLINIC | Age: 68
End: 2023-05-15

## 2023-05-15 VITALS
DIASTOLIC BLOOD PRESSURE: 78 MMHG | HEIGHT: 66 IN | WEIGHT: 192 LBS | TEMPERATURE: 98 F | BODY MASS INDEX: 30.86 KG/M2 | HEART RATE: 71 BPM | RESPIRATION RATE: 15 BRPM | OXYGEN SATURATION: 97 % | SYSTOLIC BLOOD PRESSURE: 124 MMHG

## 2023-05-15 DIAGNOSIS — Z91.89 INCREASED RISK OF BREAST CANCER: ICD-10-CM

## 2023-05-15 DIAGNOSIS — Z86.000 HISTORY OF LOBULAR CARCINOMA IN SITU (LCIS) OF BREAST: Primary | ICD-10-CM

## 2023-05-15 NOTE — PROGRESS NOTES
Surgical Oncology Follow Up       [facility]  [facility] [id number]    [facility]  1955  [id number]      Chief Complaint   Patient presents with   • Follow-up       Assessment/Plan:  1  History of lobular carcinoma in situ (LCIS) of breast      2  Increased risk of breast cancer  - 1 year f/u visit       Discussion/Summary: Patient is a 51-year-old female who presents today for a follow-up visit for right breast LCIS and ALH   She underwent surgical excision by Dr Ruben Borja   We have been screening her with annual mammograms as well as annual breast MRIs  She has declined chemoprevention    She had a bilateral mammogram in September 2022 which was BI-RADS 1, category 1 density  She had a fast breast MRI in March of this year which was BI-RADS 1  There are no concerns on today's exam   Patient prefers to discontinue MRI surveillance  We will continue clinical breast exams every 6 months as well as annual 3D mammography  She is already scheduled for a mammogram this fall and will receive a clinical breast exam with her gynecologist in approximately 6 months  I will see her back in 1 year for a follow-up visit or sooner should the need arise  She was instructed to contact me with any changes on self-exam   All of her questions were answered today  History of Present Illness:     -Interval History: Patient presents today for a follow-up visit for an increased risk of breast cancer  She notices no changes on her self breast exam   She reports no changes in her family history  She had a fast breast MRI performed in March which was benign  She prefers to discontinue MRI surveillance  Review of Systems:  Review of Systems   Constitutional: Negative for chills, fatigue and fever  Respiratory: Negative for cough and shortness of breath  Cardiovascular: Negative for chest pain     Hematological: Negative for adenopathy  Psychiatric/Behavioral: Negative for confusion  Patient Active Problem List   Diagnosis   • Gastroesophageal reflux disease   • Insomnia   • History of colon polyps   • Primary hypertension   • Depression with anxiety   • Dyslipidemia   • Chronic midline low back pain with right-sided sciatica   • History of lobular carcinoma in situ (LCIS) of breast   • Increased risk of breast cancer   • Myokymia   • Fatty liver     Past Medical History:   Diagnosis Date   • Arthritis    • Atypical lobular hyperplasia (ALH) of breast 2018    10/23/2018 right lumpectomy  Foci of lobular neoplasia (ALH/LCIS)   • Chest pain    • Colon polyps    • GERD (gastroesophageal reflux disease)    •  2 para 2    • Hypertension    • Irritable bowel syndrome    • Papanicolaou smear 11/15/2018    NEG   • Post-menopausal    • 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      Past Surgical History:   Procedure Laterality Date   • BREAST BIOPSY Right 2018   • BREAST LUMPECTOMY Right 10/2018    lobular carcinoma in situ   • CARPAL TUNNEL RELEASE Left     CTR   • COLONOSCOPY  2013    repeat in 5 yrs,  Dr Cher Morrow   • COLONOSCOPY  2019   • ESOPHAGOGASTRIC FUNDOPLASTY      Nissen fundoplication   • ESOPHAGOGASTRODUODENOSCOPY     • FOOT SURGERY Left     plantar fascia    • FUNDOPLICATION TRANSORAL     • GYNECOLOGIC CRYOSURGERY     • HERNIA REPAIR     • MAMMO (HISTORICAL) Bilateral 2018    check right   • MAMMO NEEDLE LOCALIZATION RIGHT (ALL INC) Right 10/23/2018   • MAMMO STEREOTACTIC BREAST BIOPSY RIGHT (ALL INC) Right 2018   • POLYPECTOMY  2008    ulcerated polypectomy    • DC PERQ DEVICE PLACEMENT BREAST LOC 1ST LES W/GDNCE Right 10/23/2018    Procedure: BREAST LUMPECTOMY; BREAST NEEDLE LOCATION (NEEDLE LOC AT 0830) ;   Surgeon: Guerrero Braden MD;  Location: [facility];  Service: Surgical Oncology   • TONSILLECTOMY AND ADENOIDECTOMY     • TUBAL LIGATION     • [facility] Family History   Problem Relation Age of Onset   • Diabetes Mother    • Heart disease Mother    • Osteoporosis Mother    • Hypertension Mother    • Heart disease Father    • Skin cancer Father    • Hyperlipidemia Father    • Cancer Family    • Breast cancer Paternal Aunt         unknown age   • No Known Problems Sister    • No Known Problems Daughter    • No Known Problems Maternal Aunt    • No Known Problems Paternal Aunt    • Breast cancer Maternal Grandmother         unknonw age   • Cervical cancer Paternal Grandmother         unknown age   • No Known Problems Maternal Grandfather    • No Known Problems Paternal Grandfather    • No Known Problems Maternal Aunt    • No Known Problems Maternal Aunt    • No Known Problems Paternal Aunt      Social History     Socioeconomic History   • Marital status: /Civil Union     Spouse name: Not on file   • Number of children: 2   • Years of education: Not on file   • Highest education level: Not on file   Occupational History   • Not on file   Tobacco Use   • Smoking status: Never   • Smokeless tobacco: Never   Vaping Use   • Vaping Use: Never used   Substance and Sexual Activity   • Alcohol use: Yes     Alcohol/week: 2 0 standard drinks     Types: 1 Glasses of wine, 1 Cans of beer per week     Comment: socially   • Drug use: No   • Sexual activity: Not Currently     Partners: Male     Birth control/protection: Post-menopausal   Other Topics Concern   • Not on file   Social History Narrative   • Not on file     Social Determinants of Health     Financial Resource Strain: Low Risk    • Difficulty of Paying Living Expenses: Not very hard   Food Insecurity: Not on file   Transportation Needs: No Transportation Needs   • Lack of Transportation (Medical): No   • Lack of Transportation (Non-Medical):  No   Physical Activity: Not on file   Stress: Not on file   Social Connections: Not on file   Intimate Partner Violence: Not on file   Housing Stability: Not on file Current Outpatient Medications:   •  Calcium-Magnesium-Vitamin D (CITRACAL CALCIUM+D PO), Take by mouth, Disp: , Rfl:   •  Cholecalciferol (VITAMIN D) 2000 units CAPS, Take 1 tablet by mouth daily, Disp: , Rfl:   •  ciclopirox (PENLAC) 8 % solution, Apply once a day to affected toenails/fingernails, remove once a week with alcohol, then start all over, Disp: 6 6 mL, Rfl: 5  •  docusate sodium (COLACE) 100 mg capsule, Take 1 capsule by mouth daily  , Disp: , Rfl:   •  fluticasone (FLONASE) 50 mcg/act nasal spray, 2 sprays into each nostril daily, Disp: , Rfl:   •  KRILL OIL PO, Take 1,600 mg by mouth 2 capsules daily, Disp: , Rfl:   •  Lido-Menthol-Methyl Sal-Camph (CBD KINGS EX), Apply topically, Disp: , Rfl:   •  Magnesium Oxide 250 MG TABS, Take 250 mg by mouth, Disp: , Rfl:   •  metoprolol succinate (TOPROL-XL) 25 mg 24 hr tablet, TAKE 1 TABLET (25 MG TOTAL) BY MOUTH DAILY  , Disp: 90 tablet, Rfl: 0  •  Multiple Vitamins-Minerals (WOMENS 50+ MULTI VITAMIN/MIN PO), Take by mouth, Disp: , Rfl:   •  Probiotic Product (PROBIOTIC BLEND PO), Take 1 capsule by mouth Daily at 2am, Disp: , Rfl:   •  RABEprazole (ACIPHEX) 20 MG tablet, Take 1 tablet (20 mg total) by mouth daily, Disp: 90 tablet, Rfl: 3  Allergies   Allergen Reactions   • Dyazide [Hydrochlorothiazide W-Triamterene] Other (See Comments)     myalgias   • Ace Inhibitors Cough   • Adhesive [Medical Tape] Itching     Vitals:    05/15/23 1050   BP: 124/78   Pulse: 71   Resp: 15   Temp: 98 °F (36 7 °C)   SpO2: 97%       Physical Exam  Constitutional:       Appearance: Normal appearance  HENT:      Head: Normocephalic and atraumatic  Pulmonary:      Effort: Pulmonary effort is normal    Chest:   Breasts:     Right: No swelling, bleeding, inverted nipple, mass, nipple discharge, skin change (surgical scar) or tenderness  Left: No swelling, bleeding, inverted nipple, mass, nipple discharge, skin change or tenderness     Lymphadenopathy:      Upper Body: Right upper body: No supraclavicular or axillary adenopathy  Left upper body: No supraclavicular or axillary adenopathy  Neurological:      General: No focal deficit present  Mental Status: She is alert and oriented to person, place, and time  Psychiatric:         Mood and Affect: Mood normal          Advance Care Planning/Advance Directives:  Discussed disease status and treatment goals with the patient

## 2023-06-05 ENCOUNTER — RA CDI HCC (OUTPATIENT)
Dept: OTHER | Facility: HOSPITAL | Age: 68
End: 2023-06-05

## 2023-06-05 NOTE — PROGRESS NOTES
[facility]  coding opportunities       Chart reviewed, no opportunity found:   [address]        Patients Insurance     Medicare Insurance: [employer]

## 2023-06-06 ENCOUNTER — APPOINTMENT (OUTPATIENT)
Dept: LAB | Age: 68
End: 2023-06-06
Payer: COMMERCIAL

## 2023-06-06 DIAGNOSIS — R74.01 ELEVATED ALT MEASUREMENT: ICD-10-CM

## 2023-06-06 DIAGNOSIS — E78.5 DYSLIPIDEMIA: Chronic | ICD-10-CM

## 2023-06-06 DIAGNOSIS — K76.0 FATTY LIVER: ICD-10-CM

## 2023-06-06 LAB
ALBUMIN SERPL BCP-MCNC: 3.8 G/DL (ref 3.5–5)
ALP SERPL-CCNC: 86 U/L (ref 46–116)
ALT SERPL W P-5'-P-CCNC: 58 U/L (ref 12–78)
ANION GAP SERPL CALCULATED.3IONS-SCNC: 3 MMOL/L (ref 4–13)
AST SERPL W P-5'-P-CCNC: 31 U/L (ref 5–45)
BASOPHILS # BLD AUTO: 0.08 THOUSANDS/ÂΜL (ref 0–0.1)
BASOPHILS NFR BLD AUTO: 1 % (ref 0–1)
BILIRUB DIRECT SERPL-MCNC: 0.12 MG/DL (ref 0–0.2)
BILIRUB SERPL-MCNC: 0.62 MG/DL (ref 0.2–1)
BUN SERPL-MCNC: 16 MG/DL (ref 5–25)
CALCIUM SERPL-MCNC: 9.5 MG/DL (ref 8.3–10.1)
CHLORIDE SERPL-SCNC: 110 MMOL/L (ref 96–108)
CHOLEST SERPL-MCNC: 202 MG/DL
CO2 SERPL-SCNC: 27 MMOL/L (ref 21–32)
CREAT SERPL-MCNC: 0.75 MG/DL (ref 0.6–1.3)
EOSINOPHIL # BLD AUTO: 0.42 THOUSAND/ÂΜL (ref 0–0.61)
EOSINOPHIL NFR BLD AUTO: 6 % (ref 0–6)
ERYTHROCYTE [DISTWIDTH] IN BLOOD BY AUTOMATED COUNT: 13.2 % (ref 11.6–15.1)
GFR SERPL CREATININE-BSD FRML MDRD: 83 ML/MIN/1.73SQ M
GLUCOSE P FAST SERPL-MCNC: 111 MG/DL (ref 65–99)
HCT VFR BLD AUTO: 43.2 % (ref 34.8–46.1)
HDLC SERPL-MCNC: 51 MG/DL
HGB BLD-MCNC: 14.2 G/DL (ref 11.5–15.4)
IMM GRANULOCYTES # BLD AUTO: 0.03 THOUSAND/UL (ref 0–0.2)
IMM GRANULOCYTES NFR BLD AUTO: 0 % (ref 0–2)
LDLC SERPL CALC-MCNC: 130 MG/DL (ref 0–100)
LYMPHOCYTES # BLD AUTO: 2.08 THOUSANDS/ÂΜL (ref 0.6–4.47)
LYMPHOCYTES NFR BLD AUTO: 31 % (ref 14–44)
MCH RBC QN AUTO: 30.9 PG (ref 26.8–34.3)
MCHC RBC AUTO-ENTMCNC: 32.9 G/DL (ref 31.4–37.4)
MCV RBC AUTO: 94 FL (ref 82–98)
MONOCYTES # BLD AUTO: 0.51 THOUSAND/ÂΜL (ref 0.17–1.22)
MONOCYTES NFR BLD AUTO: 8 % (ref 4–12)
NEUTROPHILS # BLD AUTO: 3.63 THOUSANDS/ÂΜL (ref 1.85–7.62)
NEUTS SEG NFR BLD AUTO: 54 % (ref 43–75)
NRBC BLD AUTO-RTO: 0 /100 WBCS
PLATELET # BLD AUTO: 306 THOUSANDS/UL (ref 149–390)
PMV BLD AUTO: 10 FL (ref 8.9–12.7)
POTASSIUM SERPL-SCNC: 4 MMOL/L (ref 3.5–5.3)
PROT SERPL-MCNC: 7.2 G/DL (ref 6.4–8.4)
RBC # BLD AUTO: 4.59 MILLION/UL (ref 3.81–5.12)
SODIUM SERPL-SCNC: 140 MMOL/L (ref 135–147)
TRIGL SERPL-MCNC: 106 MG/DL
TSH SERPL DL<=0.05 MIU/L-ACNC: 2.26 UIU/ML (ref 0.45–4.5)
WBC # BLD AUTO: 6.75 THOUSAND/UL (ref 4.31–10.16)

## 2023-06-06 PROCEDURE — 80061 LIPID PANEL: CPT

## 2023-06-06 PROCEDURE — 36415 COLL VENOUS BLD VENIPUNCTURE: CPT

## 2023-06-06 PROCEDURE — 85025 COMPLETE CBC W/AUTO DIFF WBC: CPT

## 2023-06-06 PROCEDURE — 82248 BILIRUBIN DIRECT: CPT

## 2023-06-06 PROCEDURE — 80053 COMPREHEN METABOLIC PANEL: CPT

## 2023-06-06 PROCEDURE — 84443 ASSAY THYROID STIM HORMONE: CPT

## 2023-06-12 ENCOUNTER — OFFICE VISIT (OUTPATIENT)
Dept: FAMILY MEDICINE CLINIC | Facility: CLINIC | Age: 68
End: 2023-06-12
Payer: COMMERCIAL

## 2023-06-12 DIAGNOSIS — Z86.000 HISTORY OF LOBULAR CARCINOMA IN SITU (LCIS) OF BREAST: ICD-10-CM

## 2023-06-12 DIAGNOSIS — Z78.0 MENOPAUSE: ICD-10-CM

## 2023-06-12 DIAGNOSIS — K21.9 GASTROESOPHAGEAL REFLUX DISEASE WITHOUT ESOPHAGITIS: Chronic | ICD-10-CM

## 2023-06-12 DIAGNOSIS — E78.5 DYSLIPIDEMIA: Primary | Chronic | ICD-10-CM

## 2023-06-12 DIAGNOSIS — I10 PRIMARY HYPERTENSION: ICD-10-CM

## 2023-06-12 PROBLEM — G25.3 MYOKYMIA: Status: RESOLVED | Noted: 2022-06-26 | Resolved: 2023-06-12

## 2023-06-12 PROCEDURE — 99214 OFFICE O/P EST MOD 30 MIN: CPT | Performed by: FAMILY MEDICINE

## 2023-06-12 NOTE — ASSESSMENT & PLAN NOTE
Right breast lumpectomy October 2018, foci of lobular neoplasia, Dr Fab Mann  Annual f/up  with oncology and DR Connor  Unremarkable breast MRI 3/20/2020  Patient elects to continue breast cancer surveillance with bilateral mammograms going forward

## 2023-06-12 NOTE — ASSESSMENT & PLAN NOTE
Improved total cholesterol   , total  Chol 202  We discussed weight loss, healthy diet, importance of regular physical activity  Overall cholesterol is acceptable  Patient may consider trial of red rice yeast few times per week

## 2023-06-12 NOTE — ASSESSMENT & PLAN NOTE
Aciphex daily, symptoms are well controlled  We discussed that patient may start reducing dose and skip medication every 2 to 3 days, then every other day then attempt to use it as needed

## 2023-06-12 NOTE — PROGRESS NOTES
Name: Maegan Vides      : 1955      MRN: [id number]  Encounter Provider: Mehnaz Karimi MD  Encounter Date: 2023   Encounter department: [address] [place] [address]      1  Dyslipidemia  Assessment & Plan:  Improved total cholesterol   , total  Chol 202  We discussed weight loss, healthy diet, importance of regular physical activity  Overall cholesterol is acceptable  Patient may consider trial of red rice yeast few times per week  2  Gastroesophageal reflux disease without esophagitis  Assessment & Plan:  Aciphex daily, symptoms are well controlled  We discussed that patient may start reducing dose and skip medication every 2 to 3 days, then every other day then attempt to use it as needed  3  Primary hypertension  Assessment & Plan:  Good control, continue metoprolol ER 25 mg      4  Menopause  -     DXA bone density spine hip and pelvis; Future; Expected date: 2023    5  History of lobular carcinoma in situ (LCIS) of breast  Assessment & Plan:  Right breast lumpectomy 2018, foci of lobular neoplasia, Dr Wang Montoya  Annual f/up  with oncology and DR Connor  Unremarkable breast MRI 3/20/2020  Patient elects to continue breast cancer surveillance with bilateral mammograms going forward        BMI Counseling: Body mass index is 31 64 kg/m²  The BMI is above normal  Nutrition recommendations include encouraging healthy choices of fruits and vegetables, consuming healthier snacks, moderation in carbohydrate intake, reducing intake of saturated and trans fat and reducing intake of cholesterol  Exercise recommendations include exercising 3-5 times per week  No pharmacotherapy was ordered  Patient referred to PCP  Rationale for BMI follow-up plan is due to patient being overweight or obese  Return in about 6 months (around 2023) for follow up  Subjective     Follow-up  Patient feels well  No complaints       She remains on Aciphex for chronic reflux and metoprolol for hypertension  She remains under care of GYN oncology and has pending follow-up with GYN  Patient has elected to discontinue breast MRI and will continue twice a year breast exam and mammogram for surveillance  History of LCIS, status postlumpectomy back in 2018    Results of most recent blood work performed on  discussed with patient today  Review of Systems   Constitutional: Negative  HENT: Negative  Eyes: Negative  Respiratory: Negative  Cardiovascular: Negative  Gastrointestinal: Negative  Endocrine: Negative  Genitourinary: Negative  Musculoskeletal: Negative  Allergic/Immunologic: Negative  Neurological: Negative  Hematological: Negative  Psychiatric/Behavioral: Negative          Past Medical History:   Diagnosis Date   • Arthritis    • Atypical lobular hyperplasia [facility]) of breast 2018    10/23/2018 right lumpectomy  Foci of lobular neoplasia (ALH/LCIS)   • Chest pain    • Colon polyps    • GERD (gastroesophageal reflux disease)    •  2 para 2    • Hypertension    • Irritable bowel syndrome    • Myokymia 2022   • Papanicolaou smear 11/15/2018    NEG   • Post-menopausal    • 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      Past Surgical History:   Procedure Laterality Date   • BREAST BIOPSY Right 2018   • BREAST LUMPECTOMY Right 10/2018    lobular carcinoma in situ   • CARPAL TUNNEL RELEASE Left     CTR   • COLONOSCOPY  2013    repeat in 5 yrs,  Dr Mariah Esteves   • COLONOSCOPY  2019   • ESOPHAGOGASTRIC FUNDOPLASTY      Nissen fundoplication   • ESOPHAGOGASTRODUODENOSCOPY     • FOOT SURGERY Left     plantar fascia    • FUNDOPLICATION TRANSORAL     • GYNECOLOGIC CRYOSURGERY     • HERNIA REPAIR     • MAMMO (HISTORICAL) Bilateral 2018    check right   • MAMMO NEEDLE LOCALIZATION RIGHT (ALL INC) Right 10/23/2018   • MAMMO STEREOTACTIC BREAST BIOPSY RIGHT (ALL INC) Right 8/29/2018   • POLYPECTOMY  2008    ulcerated polypectomy    • NC PERQ DEVICE PLACEMENT BREAST LOC 1ST LES W/GDNCE Right 10/23/2018    Procedure: BREAST LUMPECTOMY; BREAST NEEDLE LOCATION (NEEDLE LOC AT 0830) ; Surgeon: Charlotte Walker MD;  [facility];  Service: Surgical Oncology   • TONSILLECTOMY AND ADENOIDECTOMY     • TUBAL LIGATION     • VAGINAL DELIVERY      1980, 1982     Family History   Problem Relation Age of Onset   • Diabetes Mother    • Heart disease Mother    • Osteoporosis Mother    • Hypertension Mother    • Heart disease Father    • Skin cancer Father    • Hyperlipidemia Father    • Cancer Family    • Breast cancer Paternal Aunt         unknown age   • No Known Problems Sister    • No Known Problems Daughter    • No Known Problems Maternal Aunt    • No Known Problems Paternal Aunt    • Breast cancer Maternal Grandmother         unknonw age   • Cervical cancer Paternal Grandmother         unknown age   • No Known Problems Maternal Grandfather    • No Known Problems Paternal Grandfather    • No Known Problems Maternal Aunt    • No Known Problems Maternal Aunt    • No Known Problems Paternal Aunt      Social History     Socioeconomic History   • Marital status: /Civil Union     Spouse name: None   • Number of children: 2   • Years of education: None   • Highest education level: None   Occupational History   • None   Tobacco Use   • Smoking status: Never   • Smokeless tobacco: Never   Vaping Use   • Vaping Use: Never used   Substance and Sexual Activity   • Alcohol use:  Yes     Alcohol/week: 2 0 standard drinks of alcohol     Types: 1 Glasses of wine, 1 Cans of beer per week     Comment: socially   • Drug use: No   • Sexual activity: Not Currently     Partners: Male     Birth control/protection: Post-menopausal   Other Topics Concern   • None   Social History Narrative   • None     Social Determinants of Health     Financial Resource Strain: Low Risk  (12/18/2022)    Overall Financial Resource Strain (CARDIA)    • Difficulty of Paying Living Expenses: Not very hard   Food Insecurity: Not on file   Transportation Needs: No Transportation Needs (12/18/2022)    PRAPARE - Transportation    • Lack of Transportation (Medical): No    • Lack of Transportation (Non-Medical): No   Physical Activity: Not on file   Stress: Not on file   Social Connections: Not on file   Intimate Partner Violence: Not on file   Housing Stability: Not on file     Current Outpatient Medications on File Prior to Visit   Medication Sig   • Calcium-Magnesium-Vitamin D (CITRACAL CALCIUM+D PO) Take by mouth   • Cholecalciferol (VITAMIN D) 2000 units CAPS Take 1 tablet by mouth daily   • ciclopirox (PENLAC) 8 % solution Apply once a day to affected toenails/fingernails, remove once a week with alcohol, then start all over   • docusate sodium (COLACE) 100 mg capsule Take 1 capsule by mouth daily     • fluticasone (FLONASE) 50 mcg/act nasal spray 2 sprays into each nostril daily   • KRILL OIL PO Take 1,600 mg by mouth 2 capsules daily   • Lido-Menthol-Methyl Sal-Camph (CBD KINGS EX) Apply topically   • Magnesium Oxide 250 MG TABS Take 250 mg by mouth   • metoprolol succinate (TOPROL-XL) 25 mg 24 hr tablet TAKE 1 TABLET (25 MG TOTAL) BY MOUTH DAILY     • Multiple Vitamins-Minerals (WOMENS 50+ MULTI VITAMIN/MIN PO) Take by mouth   • Probiotic Product (PROBIOTIC BLEND PO) Take 1 capsule by mouth Daily at 2am   • RABEprazole (ACIPHEX) 20 MG tablet Take 1 tablet (20 mg total) by mouth daily     Allergies   Allergen Reactions   • Dyazide [Hydrochlorothiazide W-Triamterene] Other (See Comments)     myalgias   • Ace Inhibitors Cough   • Adhesive [Medical Tape] Itching     Immunization History   Administered Date(s) Administered   • INFLUENZA 09/15/2014, 11/14/2016   • Influenza Quadrivalent Preservative Free 3 years and older IM 11/14/2016, 09/16/2017   • Influenza, injectable, quadrivalent, preservative free 0 5 mL 10/26/2019, 09/12/2020   • "Influenza, recombinant, quadrivalent,injectable, preservative free 09/06/2018   • Influenza, seasonal, injectable 10/09/2013, 09/15/2014, 10/02/2015       Objective     /82 (BP Location: Left arm, Patient Position: Sitting, Cuff Size: Large)   Pulse 70   Temp 97 6 °F (36 4 °C)   Resp 18   Ht 5' 6\" (1 676 m)   Wt 88 9 kg (196 lb)   LMP  (LMP Unknown)   SpO2 98%   BMI 31 64 kg/m²     Physical Exam  Vitals and nursing note reviewed  Constitutional:       General: She is not in acute distress  Appearance: Normal appearance  She is well-developed  She is not ill-appearing  HENT:      Head: Normocephalic and atraumatic  Eyes:      Conjunctiva/sclera: Conjunctivae normal    Neck:      Thyroid: No thyromegaly  Vascular: No carotid bruit  Cardiovascular:      Rate and Rhythm: Normal rate and regular rhythm  Heart sounds: Normal heart sounds  No murmur heard  Comments: 128/82 HR 66  Pulmonary:      Effort: Pulmonary effort is normal  No respiratory distress  Breath sounds: Normal breath sounds  No wheezing  Abdominal:      General: There is no abdominal bruit  Musculoskeletal:         General: Normal range of motion  Cervical back: Neck supple  Neurological:      General: No focal deficit present  Mental Status: She is alert and oriented to person, place, and time  Cranial Nerves: No cranial nerve deficit  Coordination: Coordination normal    Psychiatric:         Mood and Affect: Mood normal          Behavior: Behavior normal          Thought Content:  Thought content normal        Elina Hollins MD  "

## 2023-06-14 VITALS
OXYGEN SATURATION: 98 % | BODY MASS INDEX: 31.5 KG/M2 | HEART RATE: 70 BPM | RESPIRATION RATE: 18 BRPM | DIASTOLIC BLOOD PRESSURE: 82 MMHG | WEIGHT: 196 LBS | SYSTOLIC BLOOD PRESSURE: 128 MMHG | TEMPERATURE: 97.6 F | HEIGHT: 66 IN

## 2023-07-24 DIAGNOSIS — K21.9 GASTROESOPHAGEAL REFLUX DISEASE WITHOUT ESOPHAGITIS: ICD-10-CM

## 2023-07-25 RX ORDER — RABEPRAZOLE SODIUM 20 MG/1
20 TABLET, DELAYED RELEASE ORAL DAILY
Qty: 90 TABLET | Refills: 0 | Status: SHIPPED | OUTPATIENT
Start: 2023-07-25

## 2023-07-29 DIAGNOSIS — I10 HYPERTENSION, UNSPECIFIED TYPE: ICD-10-CM

## 2023-07-29 RX ORDER — METOPROLOL SUCCINATE 25 MG/1
25 TABLET, EXTENDED RELEASE ORAL DAILY
Qty: 90 TABLET | Refills: 0 | Status: SHIPPED | OUTPATIENT
Start: 2023-07-29

## 2023-08-18 ENCOUNTER — OFFICE VISIT (OUTPATIENT)
Dept: FAMILY MEDICINE CLINIC | Facility: CLINIC | Age: 68
End: 2023-08-18
Payer: COMMERCIAL

## 2023-08-18 VITALS
TEMPERATURE: 98.2 F | BODY MASS INDEX: 30.98 KG/M2 | DIASTOLIC BLOOD PRESSURE: 98 MMHG | RESPIRATION RATE: 18 BRPM | SYSTOLIC BLOOD PRESSURE: 156 MMHG | OXYGEN SATURATION: 98 % | HEIGHT: 66 IN | WEIGHT: 192.8 LBS | HEART RATE: 75 BPM

## 2023-08-18 DIAGNOSIS — Z11.52 ENCOUNTER FOR SCREENING FOR COVID-19: Primary | ICD-10-CM

## 2023-08-18 DIAGNOSIS — R05.9 COUGH, UNSPECIFIED TYPE: ICD-10-CM

## 2023-08-18 PROCEDURE — 99213 OFFICE O/P EST LOW 20 MIN: CPT | Performed by: FAMILY MEDICINE

## 2023-08-18 RX ORDER — AZITHROMYCIN 250 MG/1
TABLET, FILM COATED ORAL
Qty: 6 TABLET | Refills: 0 | Status: SHIPPED | OUTPATIENT
Start: 2023-08-18 | End: 2023-08-23

## 2023-08-18 NOTE — PROGRESS NOTES
Name: Thomas Owens      : 1955      MRN: [id number]  Encounter Provider: Rachael Piña DO  Encounter Date: 2023   Encounter department: [address] [place] [address]       1. Cough, unspecified type  -     azithromycin (Zithromax) 250 mg tablet; Take 2 tablets (500 mg total) by mouth daily for 1 day, THEN 1 tablet (250 mg total) daily for 4 days. Subjective      HPI     one week ago started with congestion. takes allergy medication. progressively worsening despite mucinex dm and cough drops. ears have a lot of pressure, throat is sore. cough worse at night. not sleeping due to cough, ribs are sore. yellow mucous in the morning. no fever or chills. COVID test declined. Review of Systems as in HPI    Current Outpatient Medications on File Prior to Visit   Medication Sig   • Calcium-Magnesium-Vitamin D (CITRACAL CALCIUM+D PO) Take by mouth   • Cholecalciferol (VITAMIN D) 2000 units CAPS Take 1 tablet by mouth daily   • ciclopirox (PENLAC) 8 % solution Apply once a day to affected toenails/fingernails, remove once a week with alcohol, then start all over   • docusate sodium (COLACE) 100 mg capsule Take 1 capsule by mouth daily     • fluticasone (FLONASE) 50 mcg/act nasal spray 2 sprays into each nostril daily   • KRILL OIL PO Take 1,600 mg by mouth 2 capsules daily   • Lido-Menthol-Methyl Sal-Camph (CBD KINGS EX) Apply topically   • Magnesium Oxide 250 MG TABS Take 250 mg by mouth   • metoprolol succinate (TOPROL-XL) 25 mg 24 hr tablet TAKE 1 TABLET (25 MG TOTAL) BY MOUTH DAILY.    • Multiple Vitamins-Minerals (WOMENS 50+ MULTI VITAMIN/MIN PO) Take by mouth   • Probiotic Product (PROBIOTIC BLEND PO) Take 1 capsule by mouth Daily at 2am   • RABEprazole (ACIPHEX) 20 MG tablet Take 1 tablet (20 mg total) by mouth daily       Objective     /98 (BP Location: Left arm, Patient Position: Sitting, Cuff Size: Large)   Pulse 75   Temp 98.2 °F (36.8 °C) (Temporal) Resp 18   Ht 5' 6" (1.676 m)   Wt 87.5 kg (192 lb 12.8 oz)   LMP  (LMP Unknown)   SpO2 98%   BMI 31.12 kg/m²     Physical Exam  Vitals reviewed. Constitutional:       Appearance: She is well-developed. HENT:      Mouth/Throat:      Mouth: Mucous membranes are moist.      Pharynx: Posterior oropharyngeal erythema present. Tonsils: No tonsillar exudate. Eyes:      Conjunctiva/sclera: Conjunctivae normal.      Pupils: Pupils are equal, round, and reactive to light. Cardiovascular:      Rate and Rhythm: Normal rate and regular rhythm. Heart sounds: Normal heart sounds. Pulmonary:      Effort: Pulmonary effort is normal.      Breath sounds: Normal breath sounds. Skin:     General: Skin is warm and dry. Capillary Refill: Capillary refill takes less than 2 seconds. Neurological:      General: No focal deficit present. Mental Status: She is alert and oriented to person, place, and time.    Psychiatric:         Mood and Affect: Mood normal.         Behavior: Behavior normal.       She Goldsmith, DO

## 2023-09-13 ENCOUNTER — HOSPITAL ENCOUNTER (OUTPATIENT)
Dept: RADIOLOGY | Age: 68
Discharge: HOME | End: 2023-09-13
Payer: COMMERCIAL

## 2023-09-13 VITALS — WEIGHT: 193 LBS | HEIGHT: 66 IN | BODY MASS INDEX: 31.02 KG/M2

## 2023-09-13 DIAGNOSIS — Z12.31 ENCOUNTER FOR SCREENING MAMMOGRAM FOR MALIGNANT NEOPLASM OF BREAST: ICD-10-CM

## 2023-09-13 PROCEDURE — 77067 SCR MAMMO BI INCL CAD: CPT

## 2023-09-13 PROCEDURE — 77063 BREAST TOMOSYNTHESIS BI: CPT

## 2023-09-21 ENCOUNTER — OFFICE VISIT (OUTPATIENT)
Dept: FAMILY MEDICINE CLINIC | Facility: CLINIC | Age: 68
End: 2023-09-21
Payer: COMMERCIAL

## 2023-09-21 VITALS
BODY MASS INDEX: 30.7 KG/M2 | OXYGEN SATURATION: 98 % | HEART RATE: 82 BPM | RESPIRATION RATE: 16 BRPM | DIASTOLIC BLOOD PRESSURE: 78 MMHG | SYSTOLIC BLOOD PRESSURE: 130 MMHG | TEMPERATURE: 97.8 F | WEIGHT: 191 LBS | HEIGHT: 66 IN

## 2023-09-21 DIAGNOSIS — J01.90 ACUTE SINUSITIS, RECURRENCE NOT SPECIFIED, UNSPECIFIED LOCATION: Primary | ICD-10-CM

## 2023-09-21 PROCEDURE — 99213 OFFICE O/P EST LOW 20 MIN: CPT | Performed by: FAMILY MEDICINE

## 2023-09-21 RX ORDER — CEFUROXIME AXETIL 500 MG/1
500 TABLET ORAL 2 TIMES DAILY
Qty: 14 TABLET | Refills: 0 | Status: SHIPPED | OUTPATIENT
Start: 2023-09-21 | End: 2023-09-28

## 2023-09-21 RX ORDER — FLUTICASONE PROPIONATE 50 MCG
2 SPRAY, SUSPENSION (ML) NASAL DAILY
Qty: 16 G | Refills: 11 | Status: SHIPPED | OUTPATIENT
Start: 2023-09-21

## 2023-09-21 NOTE — PROGRESS NOTES
Name: Danilo Marie      : 1955      MRN: [id number]  Encounter Provider: Armando Arrieta MD  Encounter Date: 2023   Encounter department: [address] [place] [address]     1. Acute sinusitis, recurrence not specified, unspecified location  -     cefuroxime (CEFTIN) 500 mg tablet; Take 1 tablet (500 mg total) by mouth 2 (two) times a day for 7 days  -     fluticasone (FLONASE) 50 mcg/act nasal spray; 2 sprays into each nostril daily    Patient presents for evaluation of acute febrile upper respiratory infection. I recommend COVID-19 testing and explained patient that we could offer her treatment with Paxlovid. She declines COVID-19 testing. Since she presents with symptoms of URI/sinusitis-we will try Ceftin 500 mg twice daily for 7 days. Patient should use Flonase. Advised her to discontinue Claritin and switch to Zyrtec to improve symptoms of nasal congestion. She may continue Delsym and Mucinex. Patient will use Tylenol and or ibuprofen as needed for fever. Advised plenty of fluids and rest.    Patient will contact me within next few days if her symptoms are not improving significantly. Subjective      Seen for URI on 23   Treated with Zpack and felt better    Traveled to [place] this weekend and was exposed to grandchildren with URI/fever   Started with URI symptoms of HA and fever a few days ago- feels slightly better now    C/o toothache and facial pressure  Just started with the cough -  yellow mucus, tried Delsym    No chest tightness, no wheezing, no shortness of breath   Tmax 102.5 F onset of symptoms, still spikes fever at night. No ST  She has been using regimen of Claritin,Flonase,Delsym and Mucinex in the morning            Review of Systems   Constitutional: Positive for fatigue and fever. HENT: Positive for congestion and postnasal drip. Eyes: Negative. Respiratory: Positive for cough.  Negative for chest tightness and shortness of breath. Cardiovascular: Negative. Gastrointestinal: Negative. Neurological: Negative. Psychiatric/Behavioral: Negative. 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 Jada Fuller   • COLONOSCOPY  2019   • ESOPHAGOGASTRIC FUNDOPLASTY      Nissen fundoplication   • ESOPHAGOGASTRODUODENOSCOPY     • FOOT SURGERY Left     plantar fascia    • FUNDOPLICATION TRANSORAL     • [email or web address]   • HERNIA REPAIR     • MAMMO (HISTORICAL) Bilateral 2018    check right   • MAMMO NEEDLE LOCALIZATION RIGHT (ALL INC) Right 10/23/2018   • MAMMO STEREOTACTIC BREAST BIOPSY RIGHT (ALL INC) Right 2018   • POLYPECTOMY  2008    ulcerated polypectomy    • ID PERQ DEVICE PLACEMENT BREAST LOC 1ST LES W/GDNCE Right 10/23/2018    Procedure: BREAST LUMPECTOMY; BREAST NEEDLE LOCATION (NEEDLE LOC AT 0830) ;   Surgeon: Abel Benz MD;  [facility];  Service: Surgical Oncology   • TONSILLECTOMY AND ADENOIDECTOMY     • TUBAL LIGATION     • VAGINAL DELIVERY      ,      Family History   Problem Relation Age of Onset   • Diabetes Mother    • Heart disease Mother    • Osteoporosis Mother    • Hypertension Mother    • Arthritis Mother    • Heart disease Father    • Skin cancer Father    • Hyperlipidemia Father    • Arthritis Father    • Hearing loss Father    • No Known Problems Sister    • No Known Problems Daughter    • Breast cancer Maternal Grandmother         unknonw age   • No Known Problems Maternal Grandfather    • Cervical cancer Paternal Grandmother         unknown age   • No Known Problems Paternal Grandfather    • No Known Problems Maternal Aunt    • No Known Problems Maternal Aunt    • No Known Problems Maternal Aunt    • Breast cancer Paternal Aunt         unknown age   • No Known Problems Paternal Aunt    • No Known Problems Paternal Aunt    • Cancer Family      Social History     Socioeconomic History   • Marital status: /Civil Union     Spouse name: None   • Number of children: 2   • Years of education: None   • Highest education level: None   Occupational History   • None   Tobacco Use   • Smoking status: Never   • Smokeless tobacco: Never   Vaping Use   • Vaping Use: Never used   Substance and Sexual Activity   • Alcohol use: Yes     Alcohol/week: 1.0 standard drink of alcohol     Types: 1 Glasses of wine per week     Comment: Social  1 per/day   • Drug use: No   • Sexual activity: Not Currently     Partners: Male     Birth control/protection: Post-menopausal   Other Topics Concern   • None   Social History Narrative   • None     Social Determinants of Health     Financial Resource Strain: Low Risk  (12/18/2022)    Overall Financial Resource Strain (CARDIA)    • Difficulty of Paying Living Expenses: Not very hard   Food Insecurity: Not on file   Transportation Needs: No Transportation Needs (12/18/2022)    PRAPARE - Transportation    • Lack of Transportation (Medical): No    • Lack of Transportation (Non-Medical):  No   Physical Activity: Not on file   Stress: Not on file   Social Connections: Not on file   Intimate Partner Violence: Not on file   Housing Stability: Not on file     Current Outpatient Medications on File Prior to Visit   Medication Sig   • Calcium-Magnesium-Vitamin D (CITRACAL CALCIUM+D PO) Take by mouth   • Cholecalciferol (VITAMIN D) 2000 units CAPS Take 1 tablet by mouth daily   • ciclopirox (PENLAC) 8 % solution Apply once a day to affected toenails/fingernails, remove once a week with alcohol, then start all over   • docusate sodium (COLACE) 100 mg capsule Take 1 capsule by mouth daily     • KRILL OIL PO Take 1,600 mg by mouth 2 capsules daily   • Lido-Menthol-Methyl Sal-Camph (CBD KINGS EX) Apply topically   • Magnesium Oxide 250 MG TABS Take 250 mg by mouth   • metoprolol succinate (TOPROL-XL) 25 mg 24 hr tablet TAKE 1 TABLET (25 MG TOTAL) BY MOUTH DAILY. • Multiple Vitamins-Minerals (WOMENS 50+ MULTI VITAMIN/MIN PO) Take by mouth   • Probiotic Product (PROBIOTIC BLEND PO) Take 1 capsule by mouth Daily at 2am   • RABEprazole (ACIPHEX) 20 MG tablet Take 1 tablet (20 mg total) by mouth daily     Allergies   Allergen Reactions   • Dyazide [Hydrochlorothiazide W-Triamterene] Other (See Comments)     myalgias   • Ace Inhibitors Cough   • Adhesive [Medical Tape] Itching     Immunization History   Administered Date(s) Administered   • INFLUENZA 09/15/2014, 11/14/2016   • Influenza Quadrivalent Preservative Free 3 years and older IM 11/14/2016, 09/16/2017   • Influenza, injectable, quadrivalent, preservative free 0.5 mL 10/26/2019, 09/12/2020   • Influenza, recombinant, quadrivalent,injectable, preservative free 09/06/2018   • Influenza, seasonal, injectable 10/09/2013, 09/15/2014, 10/02/2015       Objective     /78 (BP Location: Left arm, Patient Position: Sitting, Cuff Size: Large)   Pulse 82   Temp 97.8 °F (36.6 °C) (Temporal)   Resp 16   Ht 5' 6" (1.676 m)   Wt 86.6 kg (191 lb)   LMP  (LMP Unknown)   SpO2 98%   BMI 30.83 kg/m²     Physical Exam  Vitals and nursing note reviewed. Constitutional:       General: She is not in acute distress. Appearance: Normal appearance. She is well-developed. She is not ill-appearing. HENT:      Head: Normocephalic and atraumatic.       Right Ear: Tympanic membrane normal.      Left Ear: Tympanic membrane normal.      Nose: Mucosal edema and congestion present. Mouth/Throat:      Mouth: Mucous membranes are moist.      Pharynx: Posterior oropharyngeal erythema present. No oropharyngeal exudate. Eyes:      Conjunctiva/sclera: Conjunctivae normal.   Cardiovascular:      Rate and Rhythm: Normal rate and regular rhythm. Heart sounds: Normal heart sounds. Pulmonary:      Effort: Pulmonary effort is normal. No respiratory distress. Breath sounds: Normal breath sounds. No wheezing or rales. Musculoskeletal:      Cervical back: Neck supple. Neurological:      Mental Status: She is alert and oriented to person, place, and time. Cranial Nerves: No cranial nerve deficit. Deep Tendon Reflexes: Reflexes are normal and symmetric. Psychiatric:         Mood and Affect: Mood normal.         Behavior: Behavior normal.         Thought Content:  Thought content normal.       Ela Staples MD

## 2023-10-13 DIAGNOSIS — J01.90 ACUTE SINUSITIS, RECURRENCE NOT SPECIFIED, UNSPECIFIED LOCATION: ICD-10-CM

## 2023-10-13 RX ORDER — FLUTICASONE PROPIONATE 50 MCG
SPRAY, SUSPENSION (ML) NASAL
Qty: 48 ML | Refills: 4 | Status: SHIPPED | OUTPATIENT
Start: 2023-10-13

## 2023-10-16 DIAGNOSIS — K21.9 GASTROESOPHAGEAL REFLUX DISEASE WITHOUT ESOPHAGITIS: ICD-10-CM

## 2023-10-16 RX ORDER — RABEPRAZOLE SODIUM 20 MG/1
20 TABLET, DELAYED RELEASE ORAL DAILY
Qty: 90 TABLET | Refills: 0 | Status: SHIPPED | OUTPATIENT
Start: 2023-10-16

## 2023-10-23 DIAGNOSIS — I10 HYPERTENSION, UNSPECIFIED TYPE: ICD-10-CM

## 2023-10-23 RX ORDER — METOPROLOL SUCCINATE 25 MG/1
25 TABLET, EXTENDED RELEASE ORAL DAILY
Qty: 90 TABLET | Refills: 0 | Status: SHIPPED | OUTPATIENT
Start: 2023-10-23

## 2023-11-15 ENCOUNTER — ANNUAL EXAM (OUTPATIENT)
Dept: GYNECOLOGY | Facility: CLINIC | Age: 68
End: 2023-11-15

## 2023-11-15 VITALS
DIASTOLIC BLOOD PRESSURE: 90 MMHG | HEIGHT: 66 IN | WEIGHT: 193 LBS | SYSTOLIC BLOOD PRESSURE: 140 MMHG | BODY MASS INDEX: 31.02 KG/M2

## 2023-11-15 DIAGNOSIS — Z12.31 SCREENING MAMMOGRAM FOR BREAST CANCER: Primary | ICD-10-CM

## 2023-11-15 NOTE — PROGRESS NOTES
Assessment/Plan:    Normal breast exam  Lobular carcinoma in situ right breast  Normal mammogram 2023. Prior normal breast MRI   Colonoscopy with polyp 2022. Due for repeat at 5 years    Plan: Rx mammogram.  Recommend healthy diet and exercise    Subjective:      Patient ID: Rishi Choudhury is a 74 y.o. female presents for annual exam but is requesting no pelvic exam to be done today. Patient does not want any more pelvic exams or Pap smears. Explained to patient that she is entitled to a pelvic exam every 2 years. Denies any pelvic pain or vaginal bleeding. Presently not sexually active. Review of Systems   Constitutional: Negative. Negative for fatigue, fever and unexpected weight change. HENT: Negative. Eyes: Negative. Respiratory: Negative. Negative for chest tightness, shortness of breath, wheezing and stridor. Cardiovascular: Negative. Negative for chest pain, palpitations and leg swelling. Gastrointestinal: Negative. Negative for abdominal pain, blood in stool, diarrhea, nausea, rectal pain and vomiting. Endocrine: Negative. Genitourinary:  Negative for dysuria, frequency, vaginal bleeding, vaginal discharge and vaginal pain. Musculoskeletal: Negative. Skin: Negative. Allergic/Immunologic: Negative. Neurological: Negative. Hematological: Negative. Psychiatric/Behavioral: Negative. All other systems reviewed and are negative. Objective:      Ht 5' 6" (1.676 m)   Wt 87.5 kg (193 lb)   LMP  (LMP Unknown)   BMI 31.15 kg/m²          Physical Exam  Cardiovascular:      Rate and Rhythm: Normal rate and regular rhythm. Pulmonary:      Effort: Pulmonary effort is normal.      Breath sounds: Normal breath sounds. Abdominal:      General: Abdomen is flat. Palpations: Abdomen is soft. Musculoskeletal:         General: Normal range of motion. Skin:     General: Skin is warm and dry.    Neurological:      General: No focal deficit present. Mental Status: She is alert. Psychiatric:         Mood and Affect: Mood normal.         Behavior: Behavior normal.         Thought Content:  Thought content normal.         Judgment: Judgment normal.

## 2023-12-11 ENCOUNTER — OFFICE VISIT (OUTPATIENT)
Dept: FAMILY MEDICINE CLINIC | Facility: CLINIC | Age: 68
End: 2023-12-11
Payer: COMMERCIAL

## 2023-12-11 VITALS
HEIGHT: 66 IN | TEMPERATURE: 98.2 F | BODY MASS INDEX: 31.34 KG/M2 | OXYGEN SATURATION: 98 % | RESPIRATION RATE: 16 BRPM | DIASTOLIC BLOOD PRESSURE: 88 MMHG | SYSTOLIC BLOOD PRESSURE: 138 MMHG | HEART RATE: 68 BPM | WEIGHT: 195 LBS

## 2023-12-11 DIAGNOSIS — K21.9 GASTROESOPHAGEAL REFLUX DISEASE WITHOUT ESOPHAGITIS: ICD-10-CM

## 2023-12-11 DIAGNOSIS — I10 PRIMARY HYPERTENSION: Chronic | ICD-10-CM

## 2023-12-11 DIAGNOSIS — Z86.000 HISTORY OF LOBULAR CARCINOMA IN SITU (LCIS) OF BREAST: ICD-10-CM

## 2023-12-11 DIAGNOSIS — J01.90 ACUTE SINUSITIS, RECURRENCE NOT SPECIFIED, UNSPECIFIED LOCATION: ICD-10-CM

## 2023-12-11 DIAGNOSIS — Z00.00 MEDICARE ANNUAL WELLNESS VISIT, SUBSEQUENT: ICD-10-CM

## 2023-12-11 DIAGNOSIS — E55.9 VITAMIN D DEFICIENCY: ICD-10-CM

## 2023-12-11 DIAGNOSIS — Z78.0 MENOPAUSE: Primary | ICD-10-CM

## 2023-12-11 DIAGNOSIS — I10 HYPERTENSION, UNSPECIFIED TYPE: ICD-10-CM

## 2023-12-11 PROCEDURE — G0439 PPPS, SUBSEQ VISIT: HCPCS | Performed by: FAMILY MEDICINE

## 2023-12-11 PROCEDURE — 99214 OFFICE O/P EST MOD 30 MIN: CPT | Performed by: FAMILY MEDICINE

## 2023-12-11 RX ORDER — FLUTICASONE PROPIONATE 50 MCG
2 SPRAY, SUSPENSION (ML) NASAL DAILY
Qty: 48 ML | Refills: 3 | Status: SHIPPED | OUTPATIENT
Start: 2023-12-11

## 2023-12-11 RX ORDER — METOPROLOL SUCCINATE 25 MG/1
25 TABLET, EXTENDED RELEASE ORAL DAILY
Qty: 90 TABLET | Refills: 3 | Status: SHIPPED | OUTPATIENT
Start: 2023-12-11

## 2023-12-11 RX ORDER — RABEPRAZOLE SODIUM 20 MG/1
20 TABLET, DELAYED RELEASE ORAL DAILY
Qty: 90 TABLET | Refills: 3 | Status: SHIPPED | OUTPATIENT
Start: 2023-12-11

## 2023-12-11 NOTE — ASSESSMENT & PLAN NOTE
History of right breast lumpectomy in October 2018, LCIS   Patient sabine under ongoing surveillance by [facility]. [facility] surgical oncology, pending follow-up in summer 2024. She had recent mammogram and fast breast MRI.   Plan for breast checks by PCP annually and hematology oncology annually

## 2023-12-11 NOTE — PATIENT INSTRUCTIONS
Medicare Preventive Visit Patient Instructions  Thank you for completing your Welcome to Medicare Visit or Medicare Annual Wellness Visit today. Your next wellness visit will be due in one year (12/11/2024). The screening/preventive services that you may require over the next 5-10 years are detailed below. Some tests may not apply to you based off risk factors and/or age. Screening tests ordered at today's visit but not completed yet may show as past due. Also, please note that scanned in results may not display below. Preventive Screenings:  Service Recommendations Previous Testing/Comments   Colorectal Cancer Screening  * Colonoscopy    * Fecal Occult Blood Test (FOBT)/Fecal Immunochemical Test (FIT)  * Fecal DNA/Cologuard Test  * Flexible Sigmoidoscopy Age: 43-73 years old   Colonoscopy: every 10 years (may be performed more frequently if at higher risk)  OR  FOBT/FIT: every 1 year  OR  Cologuard: every 3 years  OR  Sigmoidoscopy: every 5 years  Screening may be recommended earlier than age 39 if at higher risk for colorectal cancer. Also, an individualized decision between you and your healthcare provider will decide whether screening between the ages of 77-80 would be appropriate. Colonoscopy: 09/07/2022  FOBT/FIT: Not on file  Cologuard: Not on file  Sigmoidoscopy: Not on file    Screening Current     Breast Cancer Screening Age: 36 years old  Frequency: every 1-2 years  Not required if history of left and right mastectomy Mammogram: 09/13/2023    Screening Current   Cervical Cancer Screening Between the ages of 21-29, pap smear recommended once every 3 years. Between the ages of 32-69, can perform pap smear with HPV co-testing every 5 years.    Recommendations may differ for women with a history of total hysterectomy, cervical cancer, or abnormal pap smears in past. Pap Smear: 11/15/2023    Screening Not Indicated   Hepatitis C Screening Once for adults born between 1945 and 1965  More frequently in patients at high risk for Hepatitis C Hep C Antibody: Not on file    Screening Not Indicated   Diabetes Screening 1-2 times per year if you're at risk for diabetes or have pre-diabetes Fasting glucose: 111 mg/dL (6/6/2023)  A1C: No results in last 5 years (No results in last 5 years)  Screening Current   Cholesterol Screening Once every 5 years if you don't have a lipid disorder. May order more often based on risk factors. Lipid panel: 06/06/2023    Screening Current     Other Preventive Screenings Covered by Medicare:  Abdominal Aortic Aneurysm (AAA) Screening: covered once if your at risk. You're considered to be at risk if you have a family history of AAA. Lung Cancer Screening: covers low dose CT scan once per year if you meet all of the following conditions: (1) Age 48-67; (2) No signs or symptoms of lung cancer; (3) Current smoker or have quit smoking within the last 15 years; (4) You have a tobacco smoking history of at least 20 pack years (packs per day multiplied by number of years you smoked); (5) You get a written order from a healthcare provider. Glaucoma Screening: covered annually if you're considered high risk: (1) You have diabetes OR (2) Family history of glaucoma OR (3)  aged 48 and older OR (3)  American aged 72 and older  Osteoporosis Screening: covered every 2 years if you meet one of the following conditions: (1) You're estrogen deficient and at risk for osteoporosis based off medical history and other findings; (2) Have a vertebral abnormality; (3) On glucocorticoid therapy for more than 3 months; (4) Have primary hyperparathyroidism; (5) On osteoporosis medications and need to assess response to drug therapy. Last bone density test (DXA Scan): Not on file. HIV Screening: covered annually if you're between the age of 14-79. Also covered annually if you are younger than 13 and older than 72 with risk factors for HIV infection.  For pregnant patients, it is covered up to 3 times per pregnancy. Immunizations:  Immunization Recommendations   Influenza Vaccine Annual influenza vaccination during flu season is recommended for all persons aged >= 6 months who do not have contraindications   Pneumococcal Vaccine   * Pneumococcal conjugate vaccine = PCV13 (Prevnar 13), PCV15 (Vaxneuvance), PCV20 (Prevnar 20)  * Pneumococcal polysaccharide vaccine = PPSV23 (Pneumovax) Adults 02-15 yo with certain risk factors or if 69+ yo  If never received any pneumonia vaccine: recommend Prevnar 20 (PCV20)  Give PCV20 if previously received 1 dose of PCV13 or PPSV23   Hepatitis B Vaccine 3 dose series if at intermediate or high risk (ex: diabetes, end stage renal disease, liver disease)   Respiratory syncytial virus (RSV) Vaccine - COVERED BY MEDICARE PART D  * RSVPreF3 (Arexvy) CDC recommends that adults 61years of age and older may receive a single dose of RSV vaccine using shared clinical decision-making (SCDM)   Tetanus (Td) Vaccine - COST NOT COVERED BY MEDICARE PART B Following completion of primary series, a booster dose should be given every 10 years to maintain immunity against tetanus. Td may also be given as tetanus wound prophylaxis. Tdap Vaccine - COST NOT COVERED BY MEDICARE PART B Recommended at least once for all adults. For pregnant patients, recommended with each pregnancy. Shingles Vaccine (Shingrix) - COST NOT COVERED BY MEDICARE PART B  2 shot series recommended in those 19 years and older who have or will have weakened immune systems or those 50 years and older     Health Maintenance Due:      Topic Date Due   • Hepatitis C Screening  Never done   • Breast Cancer Screening: Mammogram  09/13/2025   • Cervical Cancer Screening  12/06/2026   • Colorectal Cancer Screening  09/07/2027     Immunizations Due:      Topic Date Due   • COVID-19 Vaccine (1) Never done     Advance Directives   What are advance directives?   Advance directives are legal documents that state your wishes and plans for medical care. These plans are made ahead of time in case you lose your ability to make decisions for yourself. Advance directives can apply to any medical decision, such as the treatments you want, and if you want to donate organs. What are the types of advance directives? There are many types of advance directives, and each state has rules about how to use them. You may choose a combination of any of the following:  Living will: This is a written record of the treatment you want. You can also choose which treatments you do not want, which to limit, and which to stop at a certain time. This includes surgery, medicine, IV fluid, and tube feedings. Durable power of  for healthcare [facility]): This is a written record that states who you want to make healthcare choices for you when you are unable to make them for yourself. This person, called a proxy, is usually a family member or a friend. You may choose more than 1 proxy. Do not resuscitate (DNR) order:  A DNR order is used in case your heart stops beating or you stop breathing. It is a request not to have certain forms of treatment, such as CPR. A DNR order may be included in other types of advance directives. Medical directive: This covers the care that you want if you are in a coma, near death, or unable to make decisions for yourself. You can list the treatments you want for each condition. Treatment may include pain medicine, surgery, blood transfusions, dialysis, IV or tube feedings, and a ventilator (breathing machine). Values history: This document has questions about your views, beliefs, and how you feel and think about life. This information can help others choose the care that you would choose. Why are advance directives important? An advance directive helps you control your care. Although spoken wishes may be used, it is better to have your wishes written down. Spoken wishes can be misunderstood, or not followed. Treatments may be given even if you do not want them. An advance directive may make it easier for your family to make difficult choices about your care. Urinary Incontinence   Urinary incontinence (UI)  is when you lose control of your bladder. UI develops because your bladder cannot store or empty urine properly. The 3 most common types of UI are stress incontinence, urge incontinence, or both. Medicines:   May be given to help strengthen your bladder control. Report any side effects of medication to your healthcare provider. Do pelvic muscle exercises often:  Your pelvic muscles help you stop urinating. Squeeze these muscles tight for 5 seconds, then relax for 5 seconds. Gradually work up to squeezing for 10 seconds. Do 3 sets of 15 repetitions a day, or as directed. This will help strengthen your pelvic muscles and improve bladder control. Train your bladder:  Go to the bathroom at set times, such as every 2 hours, even if you do not feel the urge to go. You can also try to hold your urine when you feel the urge to go. For example, hold your urine for 5 minutes when you feel the urge to go. As that becomes easier, hold your urine for 10 minutes. Self-care:   Keep a UI record. Write down how often you leak urine and how much you leak. Make a note of what you were doing when you leaked urine. Drink liquids as directed. You may need to limit the amount of liquid you drink to help control your urine leakage. Do not drink any liquid right before you go to bed. Limit or do not have drinks that contain caffeine or alcohol. Prevent constipation. Eat a variety of high-fiber foods. Good examples are high-fiber cereals, beans, vegetables, and whole-grain breads. Walking is the best way to trigger your intestines to have a bowel movement. Exercise regularly and maintain a healthy weight. Weight loss and exercise will decrease pressure on your bladder and help you control your leakage.    Use a catheter as directed  to help empty your bladder. A catheter is a tiny, plastic tube that is put into your bladder to drain your urine. Go to behavior therapy as directed. Behavior therapy may be used to help you learn to control your urge to urinate. Weight Management   Why it is important to manage your weight:  Being overweight increases your risk of health conditions such as heart disease, high blood pressure, type 2 diabetes, and certain types of cancer. It can also increase your risk for osteoarthritis, sleep apnea, and other respiratory problems. Aim for a slow, steady weight loss. Even a small amount of weight loss can lower your risk of health problems. How to lose weight safely:  A safe and healthy way to lose weight is to eat fewer calories and get regular exercise. You can lose up about 1 pound a week by decreasing the number of calories you eat by 500 calories each day. Healthy meal plan for weight management:  A healthy meal plan includes a variety of foods, contains fewer calories, and helps you stay healthy. A healthy meal plan includes the following:  Eat whole-grain foods more often. A healthy meal plan should contain fiber. Fiber is the part of grains, fruits, and vegetables that is not broken down by your body. Whole-grain foods are healthy and provide extra fiber in your diet. Some examples of whole-grain foods are whole-wheat breads and pastas, oatmeal, brown rice, and bulgur. Eat a variety of vegetables every day. Include dark, leafy greens such as spinach, kale, salvador greens, and mustard greens. Eat yellow and orange vegetables such as carrots, sweet potatoes, and winter squash. Eat a variety of fruits every day. Choose fresh or canned fruit (canned in its own juice or light syrup) instead of juice. Fruit juice has very little or no fiber. Eat low-fat dairy foods. Drink fat-free (skim) milk or 1% milk. Eat fat-free yogurt and low-fat cottage cheese.  Try low-fat cheeses such as mozzarella and other reduced-fat cheeses. Choose meat and other protein foods that are low in fat. Choose beans or other legumes such as split peas or lentils. Choose fish, skinless poultry (chicken or turkey), or lean cuts of red meat (beef or pork). Before you cook meat or poultry, cut off any visible fat. Use less fat and oil. Try baking foods instead of frying them. Add less fat, such as margarine, sour cream, regular salad dressing and mayonnaise to foods. Eat fewer high-fat foods. Some examples of high-fat foods include french fries, doughnuts, ice cream, and cakes. Eat fewer sweets. Limit foods and drinks that are high in sugar. This includes candy, cookies, regular soda, and sweetened drinks. Exercise:  Exercise at least 30 minutes per day on most days of the week. Some examples of exercise include walking, biking, dancing, and swimming. You can also fit in more physical activity by taking the stairs instead of the elevator or parking farther away from stores. Ask your healthcare provider about the best exercise plan for you. © Copyright [employer] 2018 Information is for End User's use only and may not be sold, redistributed or otherwise used for commercial purposes.  All illustrations and images included in CareNotes® are the copyrighted property of [employer] or [address] [place] [address]

## 2023-12-11 NOTE — PROGRESS NOTES
Assessment and Plan:     Problem List Items Addressed This Visit        Digestive    Gastroesophageal reflux disease (Chronic)     Aciphex 20 mg daily         Relevant Medications    RABEprazole (ACIPHEX) 20 MG tablet       Cardiovascular and Mediastinum    Primary hypertension (Chronic)     Continue metoprolol ER 25 mg daily         Relevant Medications    metoprolol succinate (TOPROL-XL) 25 mg 24 hr tablet    Other Relevant Orders    CBC and differential    Comprehensive metabolic panel    Lipid Panel with Direct LDL reflex    TSH, 3rd generation       Other    History of lobular carcinoma in situ (LCIS) of breast     History of right breast lumpectomy in October 2018, LCIS   Patient sabine under ongoing surveillance by [facility]. [facility] surgical oncology, pending follow-up in summer 2024. She had recent mammogram and fast breast MRI. Plan for breast checks by PCP annually and hematology oncology annually        Other Visit Diagnoses     Menopause    -  Primary    Medicare annual wellness visit, subsequent        Vitamin D deficiency        Relevant Orders    Vitamin D 25 hydroxy    Hypertension, unspecified type        Relevant Medications    metoprolol succinate (TOPROL-XL) 25 mg 24 hr tablet    Acute sinusitis, recurrence not specified, unspecified location        Relevant Medications    fluticasone (FLONASE) 50 mcg/act nasal spray        Patient declines vaccinations. Bone density scan was ordered/pending. Return in about 6 months (around 6/11/2024) for follow up. Depression Screening and Follow-up Plan: Patient was screened for depression during today's encounter. They screened negative with a PHQ-2 score of 0. Preventive health issues were discussed with patient, and age appropriate screening tests were ordered as noted in patient's After Visit Summary.   Personalized health advice and appropriate referrals for health education or preventive services given if needed, as noted in patient's After Visit Summary. History of Present Illness:     Patient presents for a Medicare Wellness Visit    Follow-up chronic medical conditions. Patient feels well. She is up-to-date with mammogram.  Last Pap with [facility] November 2022. She will be completing breast exam during her office visit with surgical oncology summertime. Patient is no longer planning to schedule fast breast MRI but will continue with close mammogram surveillance. She is under care of of Dr. Neena Jhaveri, chiropractor, for treatment of upper back and hip pain. She will contact me if symptoms are not improving. Daily Rx include metoprolol ER for hypertension and Aciphex 20 mg daily for acid reflux. Overall patient has been feeling well and stably. Patient Care Team:  Christoph Almanza MD as PCP - Jairo James MD as PCP - PCP-MD Amelia Devries MD (Colon and Rectal Surgery)  Baljinder Padron MD as Surgeon (Surgical Oncology)  Joann Nevarez MD (Obstetrics and Gynecology)     Review of Systems:     Review of Systems   Constitutional: Negative. HENT: Negative. Respiratory: Negative. Cardiovascular: Negative. Gastrointestinal: Negative. Endocrine: Negative. Genitourinary: Negative. Musculoskeletal:  Positive for arthralgias and back pain. Skin: Negative. Allergic/Immunologic: Negative. Neurological: Negative. Hematological: Negative. Psychiatric/Behavioral: Negative.           Problem List:     Patient Active Problem List   Diagnosis   • Gastroesophageal reflux disease   • Insomnia   • History of colon polyps   • Primary hypertension   • Dyslipidemia   • Chronic midline low back pain with right-sided sciatica   • History of lobular carcinoma in situ (LCIS) of breast   • Increased risk of breast cancer   • Fatty liver      Past Medical and Surgical History:     Past Medical History:   Diagnosis Date   • Arthritis    • Atypical lobular hyperplasia [facility]) of breast 2018    10/23/2018 right lumpectomy  Foci of lobular neoplasia (ALH/LCIS)   • Chest pain    • Colon polyps    • GERD (gastroesophageal reflux disease)    •  2 para 2    • Hypertension    • Irritable bowel syndrome    • Myokymia 2022   • Papanicolaou smear 11/15/2018    NEG   • Post-menopausal    • 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 Mango Etienne   • COLONOSCOPY  2019   • ESOPHAGOGASTRIC FUNDOPLASTY      Nissen fundoplication   • ESOPHAGOGASTRODUODENOSCOPY     • FOOT SURGERY Left     plantar fascia    • FUNDOPLICATION TRANSORAL     • GYNECOLOGIC CRYOSURGERY     • HERNIA REPAIR     • MAMMO (HISTORICAL) Bilateral 2018    check right   • MAMMO NEEDLE LOCALIZATION RIGHT (ALL INC) Right 10/23/2018   • MAMMO STEREOTACTIC BREAST BIOPSY RIGHT (ALL INC) Right 2018   • POLYPECTOMY  2008    ulcerated polypectomy    • HI PERQ DEVICE PLACEMENT BREAST LOC 1ST LES W/GDNCE Right 10/23/2018    Procedure: BREAST LUMPECTOMY; BREAST NEEDLE LOCATION (NEEDLE LOC AT 0830) ;   Surgeon: Toy Reynolds MD;  [facility];  Service: Surgical Oncology   • TONSILLECTOMY AND ADENOIDECTOMY     • TUBAL LIGATION     • VAGINAL DELIVERY      ,       Family History:     Family History   Problem Relation Age of Onset   • Diabetes Mother    • Heart disease Mother    • Osteoporosis Mother    • Hypertension Mother    • Arthritis Mother    • Heart disease Father    • Skin cancer Father    • Hyperlipidemia Father    • Arthritis Father    • Hearing loss Father    • No Known Problems Sister    • No Known Problems Daughter    • Breast cancer Maternal Grandmother         unknonw age   • No Known Problems Maternal Grandfather • Cervical cancer Paternal Grandmother         unknown age   • No Known Problems Paternal Grandfather    • No Known Problems Maternal Aunt    • No Known Problems Maternal Aunt    • No Known Problems Maternal Aunt    • Breast cancer Paternal Aunt         unknown age   • No Known Problems Paternal Aunt    • No Known Problems Paternal Aunt    • Cancer Family       Social History:     Social History     Socioeconomic History   • Marital status: /Civil Union     Spouse name: None   • Number of children: 2   • Years of education: None   • Highest education level: None   Occupational History   • None   Tobacco Use   • Smoking status: Never   • Smokeless tobacco: Never   Vaping Use   • Vaping status: Never Used   Substance and Sexual Activity   • Alcohol use: Yes     Alcohol/week: 1.0 standard drink of alcohol     Types: 1 Glasses of wine per week     Comment: Social  1 per/day   • Drug use: No   • Sexual activity: Not Currently     Partners: Male     Birth control/protection: Post-menopausal   Other Topics Concern   • None   Social History Narrative   • None     Social Determinants of Health     Financial Resource Strain: Low Risk  (12/11/2023)    Overall Financial Resource Strain (CARDIA)    • Difficulty of Paying Living Expenses: Not very hard   Food Insecurity: Not on file   Transportation Needs: No Transportation Needs (12/11/2023)    PRAPARE - Transportation    • Lack of Transportation (Medical): No    • Lack of Transportation (Non-Medical):  No   Physical Activity: Not on file   Stress: Not on file   Social Connections: Not on file   Intimate Partner Violence: Not on file   Housing Stability: Not on file      Medications and Allergies:     Current Outpatient Medications   Medication Sig Dispense Refill   • Calcium-Magnesium-Vitamin D (CITRACAL CALCIUM+D PO) Take by mouth     • Cholecalciferol (VITAMIN D) 2000 units CAPS Take 1 tablet by mouth daily     • docusate sodium (COLACE) 100 mg capsule Take 1 capsule by mouth daily       • fluticasone (FLONASE) 50 mcg/act nasal spray 2 sprays into each nostril daily 48 mL 3   • KRILL OIL PO Take 1,600 mg by mouth 2 capsules daily     • Lido-Menthol-Methyl Sal-Camph (CBD KINGS EX) Apply topically     • Magnesium Oxide 250 MG TABS Take 250 mg by mouth     • metoprolol succinate (TOPROL-XL) 25 mg 24 hr tablet Take 1 tablet (25 mg total) by mouth daily 90 tablet 3   • Multiple Vitamins-Minerals (WOMENS 50+ MULTI VITAMIN/MIN PO) Take by mouth     • Probiotic Product (PROBIOTIC BLEND PO) Take 1 capsule by mouth Daily at 2am     • RABEprazole (ACIPHEX) 20 MG tablet Take 1 tablet (20 mg total) by mouth daily 90 tablet 3     No current facility-administered medications for this visit. Allergies   Allergen Reactions   • Dyazide [Hydrochlorothiazide W-Triamterene] Other (See Comments)     myalgias   • Ace Inhibitors Cough   • Adhesive [Medical Tape] Itching      Immunizations:     Immunization History   Administered Date(s) Administered   • INFLUENZA 09/15/2014, 11/14/2016   • Influenza Quadrivalent Preservative Free 3 years and older IM 11/14/2016, 09/16/2017   • Influenza, injectable, quadrivalent, preservative free 0.5 mL 10/26/2019, 09/12/2020   • Influenza, recombinant, quadrivalent,injectable, preservative free 09/06/2018   • Influenza, seasonal, injectable 10/09/2013, 09/15/2014, 10/02/2015      Health Maintenance:         Topic Date Due   • Hepatitis C Screening  Never done   • Breast Cancer Screening: Mammogram  09/13/2025   • Cervical Cancer Screening  12/06/2026   • Colorectal Cancer Screening  09/07/2027         Topic Date Due   • COVID-19 Vaccine (1) Never done      Medicare Screening Tests and Risk Assessments:     Cristina Bae is here for her Subsequent Wellness visit. Health Risk Assessment:   Patient rates overall health as very good. Patient feels that their physical health rating is same. Patient is satisfied with their life. Eyesight was rated as same.  Hearing was rated as same. Patient feels that their emotional and mental health rating is same. Patients states they are never, rarely angry. Patient states they are sometimes unusually tired/fatigued. Pain experienced in the last 7 days has been some. Patient's pain rating has been 7/10. Patient states that she has experienced no weight loss or gain in last 6 months. Depression Screening:   PHQ-2 Score: 0      Fall Risk Screening: In the past year, patient has experienced: no history of falling in past year      Urinary Incontinence Screening:   Patient has leaked urine accidently in the last six months. Home Safety:  Patient does not have trouble with stairs inside or outside of their home. Patient has working smoke alarms and has no working carbon monoxide detector. Home safety hazards include: none. Nutrition:   Current diet is Regular. Medications:   Patient is currently taking over-the-counter supplements. OTC medications include: Krill oil, calcium, vitamin D3, multi vitamin, stool softner, magnesium, colon health, super beets, CBD, zinc. Patient is able to manage medications. Activities of Daily Living (ADLs)/Instrumental Activities of Daily Living (IADLs):   Walk and transfer into and out of bed and chair?: Yes  Dress and groom yourself?: Yes    Bathe or shower yourself?: Yes    Feed yourself? Yes  Do your laundry/housekeeping?: Yes  Manage your money, pay your bills and track your expenses?: Yes  Make your own meals?: Yes    Do your own shopping?: Yes    Durable Medical Equipment Suppliers  N/A    Previous Hospitalizations:   Any hospitalizations or ED visits within the last 12 months?: No      Advance Care Planning:   Living will: Yes    Durable POA for healthcare:  Yes    Advanced directive: Yes      Cognitive Screening:   Provider or family/friend/caregiver concerned regarding cognition?: No    PREVENTIVE SCREENINGS      Cardiovascular Screening:    General: Screening Current      Diabetes Screening:     General: Screening Current      Colorectal Cancer Screening:     General: Screening Current      Breast Cancer Screening:     General: Screening Current      Cervical Cancer Screening:    General: Screening Not Indicated      Osteoporosis Screening:    General: Risks and Benefits Discussed and Patient Declines      Abdominal Aortic Aneurysm (AAA) Screening:        General: Risks and Benefits Discussed and Screening Not Indicated      Lung Cancer Screening:     General: Screening Not Indicated      Hepatitis C Screening:    General: Screening Not Indicated    Screening, Brief Intervention, and Referral to Treatment (SBIRT)    Screening  Typical number of drinks in a day: 0  Typical number of drinks in a week: 0  Interpretation: Low risk drinking behavior. Single Item Drug Screening:  How often have you used an illegal drug (including marijuana) or a prescription medication for non-medical reasons in the past year? never    Single Item Drug Screen Score: 0  Interpretation: Negative screen for possible drug use disorder    Brief Intervention  Alcohol & drug use screenings were reviewed. No concerns regarding substance use disorder identified. Other Counseling Topics:   Regular weightbearing exercise. No results found. Physical Exam:     /88   Pulse 68   Temp 98.2 °F (36.8 °C) (Temporal)   Resp 16   Ht 5' 6" (1.676 m)   Wt 88.5 kg (195 lb)   LMP  (LMP Unknown)   SpO2 98%   BMI 31.47 kg/m²     Physical Exam  Vitals and nursing note reviewed. Constitutional:       General: She is not in acute distress. Appearance: Normal appearance. She is well-developed. She is not ill-appearing. HENT:      Head: Normocephalic and atraumatic. Eyes:      General: No scleral icterus. Conjunctiva/sclera: Conjunctivae normal.   Neck:      Vascular: No carotid bruit. Cardiovascular:      Rate and Rhythm: Normal rate and regular rhythm. Heart sounds: Normal heart sounds.  No murmur heard. Pulmonary:      Effort: Pulmonary effort is normal. No respiratory distress. Breath sounds: Normal breath sounds. Abdominal:      General: Bowel sounds are normal. There is no abdominal bruit. Palpations: Abdomen is soft. Tenderness: There is no abdominal tenderness. Musculoskeletal:      Cervical back: Neck supple. No rigidity. Right lower leg: No edema. Left lower leg: No edema. Skin:     General: Skin is warm. Neurological:      General: No focal deficit present. Mental Status: She is alert and oriented to person, place, and time.    Psychiatric:         Behavior: Behavior normal.          Baron Mcqueen MD

## 2024-04-29 ENCOUNTER — VBI (OUTPATIENT)
Dept: ADMINISTRATIVE | Facility: OTHER | Age: 69
End: 2024-04-29

## 2024-05-13 ENCOUNTER — RA CDI HCC (OUTPATIENT)
Dept: OTHER | Facility: HOSPITAL | Age: 69
End: 2024-05-13

## 2024-05-15 ENCOUNTER — TELEPHONE (OUTPATIENT)
Dept: ADMINISTRATIVE | Facility: OTHER | Age: 69
End: 2024-05-15

## 2024-05-15 NOTE — TELEPHONE ENCOUNTER
05/15/24 9:56 AM    Patient contacted (left message) to bring Advance Directive, POLST, or Living Will document to next scheduled pcp visit.    Thank you.  Alondra Crowe  [facility]

## 2024-05-17 ENCOUNTER — CONSULT (OUTPATIENT)
Dept: FAMILY MEDICINE CLINIC | Facility: CLINIC | Age: 69
End: 2024-05-17
Payer: COMMERCIAL

## 2024-05-17 VITALS
WEIGHT: 193 LBS | TEMPERATURE: 97.8 F | BODY MASS INDEX: 31.02 KG/M2 | DIASTOLIC BLOOD PRESSURE: 84 MMHG | OXYGEN SATURATION: 97 % | RESPIRATION RATE: 16 BRPM | HEIGHT: 66 IN | SYSTOLIC BLOOD PRESSURE: 146 MMHG | HEART RATE: 72 BPM

## 2024-05-17 DIAGNOSIS — I10 HYPERTENSION, UNSPECIFIED TYPE: ICD-10-CM

## 2024-05-17 DIAGNOSIS — H25.9 AGE-RELATED CATARACT OF BOTH EYES, UNSPECIFIED AGE-RELATED CATARACT TYPE: Primary | ICD-10-CM

## 2024-05-17 PROCEDURE — G2211 COMPLEX E/M VISIT ADD ON: HCPCS | Performed by: FAMILY MEDICINE

## 2024-05-17 PROCEDURE — 99213 OFFICE O/P EST LOW 20 MIN: CPT | Performed by: FAMILY MEDICINE

## 2024-05-17 RX ORDER — METOPROLOL SUCCINATE 25 MG/1
50 TABLET, EXTENDED RELEASE ORAL DAILY
Start: 2024-05-17

## 2024-05-17 NOTE — PROGRESS NOTES
Ambulatory Visit  Name: Kiesha Pathak      : 1955      MRN: [id number]  Encounter Provider: Soila Valdes MD  Encounter Date: 2024   Encounter department: [facility]    Assessment & Plan   1. Age-related cataract of both eyes, unspecified age-related cataract type  Comments:  Acceptable risk for catarct surgery on May 23, 2024 and 2024  2. Hypertension, unspecified type  Assessment & Plan:  Blood pressure is slightly elevated.  Patient has been under significant stress.  I recommend to increase dose of metoprolol ER from 25 to 50 mg daily.  Will reassess during our next office visit in .  Orders:  -     metoprolol succinate (TOPROL-XL) 25 mg 24 hr tablet; Take 2 tablets (50 mg total) by mouth daily         History of Present Illness     OS cataract 24  OD 24  Dr.Stuart Tian    Patient denies symptoms of chest pain, palpitations, shortness of breath or dizziness.  Medications updated.    Aside from visual blurriness patient has been feeling generally well.  Unfortunately, she has been under significant family related stress.      Review of Systems   Constitutional: Negative.    Eyes:  Positive for visual disturbance.   Respiratory: Negative.     Cardiovascular: Negative.    Gastrointestinal: Negative.    Genitourinary: Negative.    Musculoskeletal: Negative.    Neurological: Negative.    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 Laurent   • COLONOSCOPY  06/12/2019   • ESOPHAGOGASTRIC FUNDOPLASTY      Nissen fundoplication   • ESOPHAGOGASTRODUODENOSCOPY     • FOOT SURGERY Left     plantar fascia    • FUNDOPLICATION TRANSORAL     • GYNECOLOGIC CRYOSURGERY  1995   • HERNIA REPAIR  2010   • MAMMO (HISTORICAL) Bilateral 08/28/2018    check right   • MAMMO NEEDLE LOCALIZATION RIGHT (ALL INC) Right 10/23/2018   • MAMMO STEREOTACTIC BREAST BIOPSY RIGHT (ALL INC) Right 8/29/2018   • POLYPECTOMY  2008    ulcerated polypectomy    • IA PERQ DEVICE PLACEMENT BREAST LOC 1ST LES W/GDNCE Right 10/23/2018    Procedure: BREAST LUMPECTOMY; BREAST NEEDLE LOCATION (NEEDLE LOC AT 0830) ;  Surgeon: Dyllan Moraes MD;  [facility];  Service: Surgical Oncology   • TONSILLECTOMY AND ADENOIDECTOMY     • TUBAL LIGATION     • VAGINAL DELIVERY      1980, 1982     Family History   Problem Relation Age of Onset   • Diabetes Mother    • Heart disease Mother    • Osteoporosis Mother    • Hypertension Mother    • Arthritis Mother    • Heart disease Father    • Skin cancer Father    • Hyperlipidemia Father    • Arthritis Father    • Hearing loss Father    • No Known Problems Sister    • No Known Problems Daughter    • Breast cancer Maternal Grandmother         unknonw age   • No Known Problems Maternal Grandfather    • Cervical cancer Paternal Grandmother         unknown age   • No Known Problems Paternal Grandfather    • No Known Problems Maternal Aunt    • No Known Problems Maternal Aunt    • No Known Problems Maternal Aunt    • Breast cancer Paternal Aunt         unknown age   • No Known Problems Paternal Aunt    • No Known Problems Paternal Aunt    • Cancer Family      Social History     Tobacco Use   • Smoking status: Never   • Smokeless tobacco: Never   Vaping Use   • Vaping  "status: Never Used   Substance and Sexual Activity   • Alcohol use: Yes     Alcohol/week: 1.0 standard drink of alcohol     Types: 1 Glasses of wine per week     Comment: Social  1 per/day   • Drug use: No   • Sexual activity: Not Currently     Partners: Male     Birth control/protection: Post-menopausal     Current Outpatient Medications on File Prior to Visit   Medication Sig   • Calcium-Magnesium-Vitamin D (CITRACAL CALCIUM+D PO) Take by mouth   • Cholecalciferol (VITAMIN D) 2000 units CAPS Take 1 tablet by mouth daily   • docusate sodium (COLACE) 100 mg capsule Take 1 capsule by mouth daily     • fluticasone (FLONASE) 50 mcg/act nasal spray 2 sprays into each nostril daily   • KRILL OIL PO Take 1,600 mg by mouth 2 capsules daily   • Lido-Menthol-Methyl Sal-Camph (CBD KINGS EX) Apply topically   • Magnesium Oxide 250 MG TABS Take 250 mg by mouth   • Multiple Vitamins-Minerals (WOMENS 50+ MULTI VITAMIN/MIN PO) Take by mouth   • Probiotic Product (PROBIOTIC BLEND PO) Take 1 capsule by mouth Daily at 2am   • RABEprazole (ACIPHEX) 20 MG tablet Take 1 tablet (20 mg total) by mouth daily   • [DISCONTINUED] metoprolol succinate (TOPROL-XL) 25 mg 24 hr tablet Take 1 tablet (25 mg total) by mouth daily     Allergies   Allergen Reactions   • Dyazide [Hydrochlorothiazide W-Triamterene] Other (See Comments)     myalgias   • Ace Inhibitors Cough   • Adhesive [Medical Tape] Itching     Immunization History   Administered Date(s) Administered   • INFLUENZA 09/15/2014, 11/14/2016   • Influenza Quadrivalent Preservative Free 3 years and older IM 11/14/2016, 09/16/2017   • Influenza, injectable, quadrivalent, preservative free 0.5 mL 10/26/2019, 09/12/2020   • Influenza, recombinant, quadrivalent,injectable, preservative free 09/06/2018   • Influenza, seasonal, injectable 10/09/2013, 09/15/2014, 10/02/2015     Objective     /84   Pulse 72   Temp 97.8 °F (36.6 °C) (Temporal)   Resp 16   Ht 5' 6\" (1.676 m)   Wt 87.5 kg " (193 lb)   LMP  (LMP Unknown)   SpO2 97%   BMI 31.15 kg/m²     Physical Exam  Vitals and nursing note reviewed.   Constitutional:       General: She is not in acute distress.     Appearance: Normal appearance. She is well-developed. She is not ill-appearing.   HENT:      Head: Normocephalic and atraumatic.   Eyes:      General: No scleral icterus.     Conjunctiva/sclera: Conjunctivae normal.   Neck:      Vascular: No carotid bruit.   Cardiovascular:      Rate and Rhythm: Normal rate and regular rhythm.      Heart sounds: Normal heart sounds. No murmur heard.  Pulmonary:      Effort: Pulmonary effort is normal. No respiratory distress.      Breath sounds: Normal breath sounds.   Abdominal:      General: There is no abdominal bruit.      Palpations: Abdomen is soft.   Musculoskeletal:      Cervical back: Neck supple. No rigidity.      Right lower leg: No edema.      Left lower leg: No edema.   Skin:     General: Skin is warm.   Neurological:      General: No focal deficit present.      Mental Status: She is alert and oriented to person, place, and time.   Psychiatric:         Behavior: Behavior normal.         Thought Content: Thought content normal.      Comments: Worried, under stress       Administrative Statements

## 2024-05-17 NOTE — ASSESSMENT & PLAN NOTE
Blood pressure is slightly elevated.  Patient has been under significant stress.  I recommend to increase dose of metoprolol ER from 25 to 50 mg daily.  Will reassess during our next office visit in June.

## 2024-05-20 ENCOUNTER — OFFICE VISIT (OUTPATIENT)
Dept: SURGICAL ONCOLOGY | Facility: CLINIC | Age: 69
End: 2024-05-20
Payer: COMMERCIAL

## 2024-05-20 VITALS
HEIGHT: 66 IN | BODY MASS INDEX: 30.98 KG/M2 | WEIGHT: 192.8 LBS | DIASTOLIC BLOOD PRESSURE: 90 MMHG | HEART RATE: 57 BPM | OXYGEN SATURATION: 98 % | TEMPERATURE: 97.2 F | SYSTOLIC BLOOD PRESSURE: 138 MMHG

## 2024-05-20 DIAGNOSIS — Z91.89 INCREASED RISK OF BREAST CANCER: ICD-10-CM

## 2024-05-20 DIAGNOSIS — Z86.000 HISTORY OF LOBULAR CARCINOMA IN SITU (LCIS) OF BREAST: Primary | ICD-10-CM

## 2024-05-20 PROCEDURE — 99213 OFFICE O/P EST LOW 20 MIN: CPT | Performed by: NURSE PRACTITIONER

## 2024-05-20 NOTE — PROGRESS NOTES
Surgical Oncology Follow Up       [address]  [facility] [place]  [address]  [facility] [id number]    Kiesha J Zo  1955  [id number]      Chief Complaint   Patient presents with    Follow-up       Assessment/Plan:  1. History of lobular carcinoma in situ (LCIS) of breast    2. Increased risk of breast cancer  - 1 year f/u visit      Discussion/Summary: Patient is a 68-year-old female who presents today for a follow-up visit for right breast LCIS and ALH.  She underwent surgical excision by Dr. Moraes in October of 2018. She has declined chemoprevention.  We had been screening her with annual mammograms as well as annual breast MRIs but she has elected to discontinue breast MRI. Therefore, I am screening her with clinical breast exams every 6 months as well as annual 3D mammography.  She had a bilateral 3d screening mammogram in September of 2023 which was BIRADS 1, category 1 density. No worrisome findings on today's clinical exam. She is already ordered/scheduled for a mammogram in September. She will receive a CBE with her PCP in six months. I will see her back in 1 year or sooner should the need arise. She was instructed to contact me with any changes or concerns in the interim.     History of Present Illness:     -Interval History: Patient presents today for a follow up visit. She has not appreciated any changes on self breast exam. She had a bilateral mammogram in September which was BIRADS 1, category 1 density. States her sister was diagnosed with a rare, aggressive form of lymphoma affecting her eyes. She does not know if she has undergone genetic testing.     Review of Systems:  Review of Systems   Constitutional:  Negative for chills, fatigue and fever.   Respiratory:  Negative for cough and shortness of breath.    Cardiovascular:  Negative for chest pain.   Hematological:  Negative for adenopathy.   Psychiatric/Behavioral:  Negative for confusion.         Patient Active Problem List   Diagnosis    Gastroesophageal reflux disease    Insomnia    History of colon polyps    Hypertension    Dyslipidemia    Chronic midline low back pain with right-sided sciatica    History of lobular carcinoma in situ (LCIS) of breast    Increased risk of breast cancer    Fatty liver     Past Medical History:   Diagnosis Date    Arthritis     Atypical lobular hyperplasia (ALH) of breast 2018    10/23/2018 right lumpectomy  Foci of lobular neoplasia (ALH/LCIS)    Chest pain     Colon polyps     GERD (gastroesophageal reflux disease)      2 para 2     Hypertension     Irritable bowel syndrome     Myokymia 2022    Papanicolaou smear 11/15/2018    NEG    Post-menopausal     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 Laurent    COLONOSCOPY  2019    ESOPHAGOGASTRIC FUNDOPLASTY      Nissen fundoplication    ESOPHAGOGASTRODUODENOSCOPY      FOOT SURGERY Left     plantar fascia     FUNDOPLICATION TRANSORAL      GYNECOLOGIC CRYOSURGERY      HERNIA REPAIR  2010    MAMMO (HISTORICAL) Bilateral 2018    check right    MAMMO NEEDLE LOCALIZATION RIGHT (ALL INC) Right 10/23/2018    MAMMO STEREOTACTIC BREAST BIOPSY RIGHT (ALL INC) Right 2018    POLYPECTOMY  2008    ulcerated polypectomy     WI PERQ DEVICE PLACEMENT BREAST LOC 1ST LES W/GDNCE Right 10/23/2018    Procedure: BREAST LUMPECTOMY; BREAST NEEDLE LOCATION (NEEDLE LOC AT 0830) ;  Surgeon: Dyllan Moraes MD;  Location: [facility];  Service: Surgical Oncology    TONSILLECTOMY AND ADENOIDECTOMY      TUBAL LIGATION      VAGINAL DELIVERY      ,      Family History   Problem Relation Age of Onset    Diabetes Mother     Heart disease Mother     Osteoporosis Mother      Hypertension Mother     Arthritis Mother     Heart disease Father     Skin cancer Father     Hyperlipidemia Father     Arthritis Father     Hearing loss Father     No Known Problems Sister     No Known Problems Daughter     Breast cancer Maternal Grandmother         unknonw age    No Known Problems Maternal Grandfather     Cervical cancer Paternal Grandmother         unknown age    No Known Problems Paternal Grandfather     No Known Problems Maternal Aunt     No Known Problems Maternal Aunt     No Known Problems Maternal Aunt     Breast cancer Paternal Aunt         unknown age    No Known Problems Paternal Aunt     No Known Problems Paternal Aunt     Cancer Family      Social History     Socioeconomic History    Marital status: /Civil Union     Spouse name: Not on file    Number of children: 2    Years of education: Not on file    Highest education level: Not on file   Occupational History    Not on file   Tobacco Use    Smoking status: Never    Smokeless tobacco: Never   Vaping Use    Vaping status: Never Used   Substance and Sexual Activity    Alcohol use: Yes     Alcohol/week: 1.0 standard drink of alcohol     Types: 1 Glasses of wine per week     Comment: Social  1 per/day    Drug use: No    Sexual activity: Not Currently     Partners: Male     Birth control/protection: Post-menopausal   Other Topics Concern    Not on file   Social History Narrative    Not on file     Social Determinants of Health     Financial Resource Strain: Low Risk  (12/11/2023)    Overall Financial Resource Strain (CARDIA)     Difficulty of Paying Living Expenses: Not very hard   Food Insecurity: Not on file   Transportation Needs: No Transportation Needs (12/11/2023)    PRAPARE - Transportation     Lack of Transportation (Medical): No     Lack of Transportation (Non-Medical): No   Physical Activity: Not on file   Stress: Not on file   Social Connections: Not on file   Intimate Partner Violence: Not on file   Housing Stability: Not  on file       Current Outpatient Medications:     Calcium-Magnesium-Vitamin D (CITRACAL CALCIUM+D PO), Take by mouth, Disp: , Rfl:     Cholecalciferol (VITAMIN D) 2000 units CAPS, Take 1 tablet by mouth daily, Disp: , Rfl:     docusate sodium (COLACE) 100 mg capsule, Take 1 capsule by mouth daily  , Disp: , Rfl:     fluticasone (FLONASE) 50 mcg/act nasal spray, 2 sprays into each nostril daily, Disp: 48 mL, Rfl: 3    KRILL OIL PO, Take 1,600 mg by mouth 2 capsules daily, Disp: , Rfl:     Lido-Menthol-Methyl Sal-Camph (CBD KINGS EX), Apply topically, Disp: , Rfl:     Magnesium Oxide 250 MG TABS, Take 250 mg by mouth, Disp: , Rfl:     metoprolol succinate (TOPROL-XL) 25 mg 24 hr tablet, Take 2 tablets (50 mg total) by mouth daily, Disp: , Rfl:     Multiple Vitamins-Minerals (WOMENS 50+ MULTI VITAMIN/MIN PO), Take by mouth, Disp: , Rfl:     Probiotic Product (PROBIOTIC BLEND PO), Take 1 capsule by mouth Daily at 2am, Disp: , Rfl:     RABEprazole (ACIPHEX) 20 MG tablet, Take 1 tablet (20 mg total) by mouth daily, Disp: 90 tablet, Rfl: 3  Allergies   Allergen Reactions    Dyazide [Hydrochlorothiazide W-Triamterene] Other (See Comments)     myalgias    Ace Inhibitors Cough    Adhesive [Medical Tape] Itching     Vitals:    05/20/24 1100   Temp: (!) 97.2 °F (36.2 °C)       Physical Exam  Constitutional:       Appearance: Normal appearance.   HENT:      Head: Normocephalic and atraumatic.   Pulmonary:      Effort: Pulmonary effort is normal.   Chest:   Breasts:     Right: No swelling, bleeding, inverted nipple, mass, nipple discharge, skin change (surgical scar) or tenderness.      Left: No swelling, bleeding, inverted nipple, mass, nipple discharge, skin change or tenderness.   Lymphadenopathy:      Upper Body:      Right upper body: No supraclavicular or axillary adenopathy.      Left upper body: No supraclavicular or axillary adenopathy.   Neurological:      General: No focal deficit present.      Mental Status: She is  alert and oriented to person, place, and time.   Psychiatric:         Mood and Affect: Mood normal.         Advance Care Planning/Advance Directives:  Discussed disease status and treatment goals with the patient.

## 2024-06-11 ENCOUNTER — OFFICE VISIT (OUTPATIENT)
Dept: FAMILY MEDICINE CLINIC | Facility: CLINIC | Age: 69
End: 2024-06-11
Payer: COMMERCIAL

## 2024-06-11 DIAGNOSIS — I10 HYPERTENSION, UNSPECIFIED TYPE: ICD-10-CM

## 2024-06-11 DIAGNOSIS — Z01.818 PRE-OP EXAMINATION: Primary | ICD-10-CM

## 2024-06-11 DIAGNOSIS — F41.9 ANXIETY: ICD-10-CM

## 2024-06-11 PROCEDURE — G2211 COMPLEX E/M VISIT ADD ON: HCPCS | Performed by: FAMILY MEDICINE

## 2024-06-11 PROCEDURE — 99213 OFFICE O/P EST LOW 20 MIN: CPT | Performed by: FAMILY MEDICINE

## 2024-06-11 RX ORDER — METOPROLOL SUCCINATE 50 MG/1
50 TABLET, EXTENDED RELEASE ORAL DAILY
Qty: 90 TABLET | Refills: 3 | Status: SHIPPED | OUTPATIENT
Start: 2024-06-11

## 2024-06-11 RX ORDER — PREDNISOLONE ACETATE 10 MG/ML
SUSPENSION/ DROPS OPHTHALMIC
COMMUNITY
Start: 2024-05-28

## 2024-06-11 NOTE — ASSESSMENT & PLAN NOTE
Blood pressure is elevated upon arrival.  Patient admits to being stressed and worried.  Blood pressure has normalized on my recheck at 138/80.  Continue metoprolol ER 50 mg daily.  Dose was increased a few weeks ago.

## 2024-06-11 NOTE — PROGRESS NOTES
"Ambulatory Visit  Name: Kiesha Pathak      : 1955      MRN: [id number]  Encounter Provider: Soila Valdes MD  Encounter Date: 2024   Encounter department: [facility]    Assessment & Plan   1. Pre-op examination  Comments:  Patient is acceptable risk for planned cataract surgery on .  2. Hypertension, unspecified type  Assessment & Plan:  Blood pressure is elevated upon arrival.  Patient admits to being stressed and worried.  Blood pressure has normalized on my recheck at 138/80.  Continue metoprolol ER 50 mg daily.  Dose was increased a few weeks ago.    Orders:  -     metoprolol succinate (TOPROL-XL) 50 mg 24 hr tablet; Take 1 tablet (50 mg total) by mouth daily  3. Anxiety  Assessment & Plan:  Patient reports significant symptoms of anxiety and stress lately.  She is reluctant to start any prescription medications.  She felt well on CBD Gummies and is planning to restart.  We discussed trial of BuSpar, she will consider and will contact me if she decides to try Rx     Patient will proceed with complete blood work prior to her annual wellness exam in December.  She will be due for comprehensive breast exam at that time.    Return in about 6 months (around 2024) for Medicare wellness, follow up.    History of Present Illness     Pre-op evaluation prior to cataract treated.  -[facility] rec to schedule on 2024  Dr.Stuart Tian  Denies symptoms of chest pain, palpitations, shortness of breath or dizziness.  She had recent left eye cataract which went well.Patient has follow-up with her optometrist and is concerned about finding of \"small second cataract\".  She admits to improve vision on her left.    Significant stress.  Family related.  Patient admits to ongoing anxiety.  She is not interested in prescription medications but will restart CBD Gummies that helped in the past.     The patient will be due for breast examination in December at the time of " her annual physical.      Review of Systems   Constitutional: Negative.    HENT: Negative.     Eyes:  Positive for visual disturbance.   Respiratory: Negative.     Cardiovascular: Negative.    Psychiatric/Behavioral:  The patient is nervous/anxious.      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 Laurent   • COLONOSCOPY  2019   • ESOPHAGOGASTRIC FUNDOPLASTY      Nissen fundoplication   • ESOPHAGOGASTRODUODENOSCOPY     • FOOT SURGERY Left     plantar fascia    • FUNDOPLICATION TRANSORAL     • GYNECOLOGIC CRYOSURGERY     • HERNIA REPAIR     • MAMMO (HISTORICAL) Bilateral 2018    check right   • MAMMO NEEDLE LOCALIZATION RIGHT (ALL INC) Right 10/23/2018   • MAMMO STEREOTACTIC BREAST BIOPSY RIGHT (ALL INC) Right 2018   • POLYPECTOMY  2008    ulcerated polypectomy    • OR PERQ DEVICE PLACEMENT BREAST LOC 1ST LES W/GDNCE Right 10/23/2018    Procedure: BREAST LUMPECTOMY; BREAST NEEDLE LOCATION (NEEDLE LOC AT 0830) ;  Surgeon: Dyllan Moraes MD;  Location[facility];  Service: Surgical Oncology   • TONSILLECTOMY AND ADENOIDECTOMY     • TUBAL LIGATION     • VAGINAL DELIVERY      ,      Family History   Problem Relation Age of Onset   • Diabetes Mother    • Heart disease Mother    • Osteoporosis Mother    • Hypertension Mother    • Arthritis  Mother    • Heart disease Father    • Skin cancer Father    • Hyperlipidemia Father    • Arthritis Father    • Hearing loss Father    • No Known Problems Sister    • No Known Problems Daughter    • Breast cancer Maternal Grandmother         unknonw age   • No Known Problems Maternal Grandfather    • Cervical cancer Paternal Grandmother         unknown age   • No Known Problems Paternal Grandfather    • No Known Problems Maternal Aunt    • No Known Problems Maternal Aunt    • No Known Problems Maternal Aunt    • Breast cancer Paternal Aunt         unknown age   • No Known Problems Paternal Aunt    • No Known Problems Paternal Aunt    • Cancer Family      Social History     Tobacco Use   • Smoking status: Never   • Smokeless tobacco: Never   Vaping Use   • Vaping status: Never Used   Substance and Sexual Activity   • Alcohol use: Yes     Alcohol/week: 1.0 standard drink of alcohol     Types: 1 Glasses of wine per week     Comment: Social  1 per/day   • Drug use: No   • Sexual activity: Not Currently     Partners: Male     Birth control/protection: Post-menopausal     Current Outpatient Medications on File Prior to Visit   Medication Sig   • Calcium-Magnesium-Vitamin D (CITRACAL CALCIUM+D PO) Take by mouth   • Cholecalciferol (VITAMIN D) 2000 units CAPS Take 1 tablet by mouth daily   • docusate sodium (COLACE) 100 mg capsule Take 1 capsule by mouth daily     • fluticasone (FLONASE) 50 mcg/act nasal spray 2 sprays into each nostril daily   • KRILL OIL PO Take 1,600 mg by mouth 2 capsules daily   • Lido-Menthol-Methyl Sal-Camph (CBD KINGS EX) Apply topically   • Magnesium Oxide 250 MG TABS Take 250 mg by mouth   • Multiple Vitamins-Minerals (WOMENS 50+ MULTI VITAMIN/MIN PO) Take by mouth   • prednisoLONE acetate (PRED FORTE) 1 % ophthalmic suspension    • Probiotic Product (PROBIOTIC BLEND PO) Take 1 capsule by mouth Daily at 2am   • RABEprazole (ACIPHEX) 20 MG tablet Take 1 tablet (20 mg total) by mouth daily  "    Allergies   Allergen Reactions   • Dyazide [Hydrochlorothiazide W-Triamterene] Other (See Comments)     myalgias   • Ace Inhibitors Cough   • Adhesive [Medical Tape] Itching     Immunization History   Administered Date(s) Administered   • INFLUENZA 09/15/2014, 11/14/2016   • Influenza Quadrivalent Preservative Free 3 years and older IM 11/14/2016, 09/16/2017   • Influenza, injectable, quadrivalent, preservative free 0.5 mL 10/26/2019, 09/12/2020   • Influenza, recombinant, quadrivalent,injectable, preservative free 09/06/2018   • Influenza, seasonal, injectable 10/09/2013, 09/15/2014, 10/02/2015     Objective     /80 (BP Location: Left arm, Patient Position: Sitting, Cuff Size: Large)   Pulse 58   Temp 97.8 °F (36.6 °C) (Temporal)   Resp 16   Ht 5' 6\" (1.676 m)   Wt 86.9 kg (191 lb 9.6 oz)   LMP  (LMP Unknown)   SpO2 98%   BMI 30.93 kg/m²     Physical Exam  Vitals and nursing note reviewed.   Constitutional:       General: She is not in acute distress.     Appearance: Normal appearance. She is well-developed. She is not ill-appearing.   HENT:      Head: Normocephalic and atraumatic.   Eyes:      Conjunctiva/sclera: Conjunctivae normal.   Neck:      Thyroid: No thyromegaly.      Vascular: No carotid bruit.   Cardiovascular:      Rate and Rhythm: Normal rate and regular rhythm.      Heart sounds: Normal heart sounds. No murmur heard.  Pulmonary:      Effort: Pulmonary effort is normal. No respiratory distress.      Breath sounds: Normal breath sounds. No wheezing.   Abdominal:      General: There is no abdominal bruit.   Musculoskeletal:         General: Normal range of motion.      Cervical back: Neck supple.   Neurological:      General: No focal deficit present.      Mental Status: She is alert and oriented to person, place, and time.      Cranial Nerves: No cranial nerve deficit.      Coordination: Coordination normal.   Psychiatric:         Mood and Affect: Mood normal.         Behavior: " Behavior normal.         Thought Content: Thought content normal.       Administrative Statements

## 2024-06-12 VITALS
DIASTOLIC BLOOD PRESSURE: 80 MMHG | BODY MASS INDEX: 30.79 KG/M2 | TEMPERATURE: 97.8 F | OXYGEN SATURATION: 98 % | HEIGHT: 66 IN | RESPIRATION RATE: 16 BRPM | SYSTOLIC BLOOD PRESSURE: 138 MMHG | WEIGHT: 191.6 LBS | HEART RATE: 58 BPM

## 2024-06-12 PROBLEM — F41.9 ANXIETY: Status: ACTIVE | Noted: 2024-06-12

## 2024-06-13 NOTE — ASSESSMENT & PLAN NOTE
Patient reports significant symptoms of anxiety and stress lately.  She is reluctant to start any prescription medications.  She felt well on CBD Gummies and is planning to restart.  We discussed trial of BuSpar, she will consider and will contact me if she decides to try Rx

## 2024-09-17 ENCOUNTER — HOSPITAL ENCOUNTER (OUTPATIENT)
Dept: RADIOLOGY | Age: 69
Discharge: HOME | End: 2024-09-17
Payer: COMMERCIAL

## 2024-09-17 VITALS — BODY MASS INDEX: 31.18 KG/M2 | HEIGHT: 66 IN | WEIGHT: 194 LBS

## 2024-09-17 DIAGNOSIS — Z12.31 VISIT FOR SCREENING MAMMOGRAM: ICD-10-CM

## 2024-09-17 PROCEDURE — 77063 BREAST TOMOSYNTHESIS BI: CPT

## 2024-09-17 PROCEDURE — 77067 SCR MAMMO BI INCL CAD: CPT

## 2024-10-09 ENCOUNTER — APPOINTMENT (OUTPATIENT)
Dept: LAB | Age: 69
End: 2024-10-09
Payer: COMMERCIAL

## 2024-10-09 DIAGNOSIS — E55.9 VITAMIN D DEFICIENCY: ICD-10-CM

## 2024-10-09 DIAGNOSIS — I10 PRIMARY HYPERTENSION: Chronic | ICD-10-CM

## 2024-10-09 LAB
25(OH)D3 SERPL-MCNC: 47.2 NG/ML (ref 30–100)
ALBUMIN SERPL BCG-MCNC: 4.4 G/DL (ref 3.5–5)
ALP SERPL-CCNC: 72 U/L (ref 34–104)
ALT SERPL W P-5'-P-CCNC: 33 U/L (ref 7–52)
ANION GAP SERPL CALCULATED.3IONS-SCNC: 9 MMOL/L (ref 4–13)
AST SERPL W P-5'-P-CCNC: 25 U/L (ref 13–39)
BASOPHILS # BLD AUTO: 0.07 THOUSANDS/ΜL (ref 0–0.1)
BASOPHILS NFR BLD AUTO: 1 % (ref 0–1)
BILIRUB SERPL-MCNC: 0.54 MG/DL (ref 0.2–1)
BUN SERPL-MCNC: 14 MG/DL (ref 5–25)
CALCIUM SERPL-MCNC: 9.7 MG/DL (ref 8.4–10.2)
CHLORIDE SERPL-SCNC: 103 MMOL/L (ref 96–108)
CHOLEST SERPL-MCNC: 222 MG/DL
CO2 SERPL-SCNC: 29 MMOL/L (ref 21–32)
CREAT SERPL-MCNC: 0.73 MG/DL (ref 0.6–1.3)
EOSINOPHIL # BLD AUTO: 0.35 THOUSAND/ΜL (ref 0–0.61)
EOSINOPHIL NFR BLD AUTO: 5 % (ref 0–6)
ERYTHROCYTE [DISTWIDTH] IN BLOOD BY AUTOMATED COUNT: 12.9 % (ref 11.6–15.1)
GFR SERPL CREATININE-BSD FRML MDRD: 86 ML/MIN/1.73SQ M
GLUCOSE P FAST SERPL-MCNC: 102 MG/DL (ref 65–99)
HCT VFR BLD AUTO: 44.4 % (ref 34.8–46.1)
HDLC SERPL-MCNC: 45 MG/DL
HGB BLD-MCNC: 14.6 G/DL (ref 11.5–15.4)
IMM GRANULOCYTES # BLD AUTO: 0.03 THOUSAND/UL (ref 0–0.2)
IMM GRANULOCYTES NFR BLD AUTO: 0 % (ref 0–2)
LDLC SERPL CALC-MCNC: 149 MG/DL (ref 0–100)
LYMPHOCYTES # BLD AUTO: 2.34 THOUSANDS/ΜL (ref 0.6–4.47)
LYMPHOCYTES NFR BLD AUTO: 30 % (ref 14–44)
MCH RBC QN AUTO: 31.1 PG (ref 26.8–34.3)
MCHC RBC AUTO-ENTMCNC: 32.9 G/DL (ref 31.4–37.4)
MCV RBC AUTO: 95 FL (ref 82–98)
MONOCYTES # BLD AUTO: 0.63 THOUSAND/ΜL (ref 0.17–1.22)
MONOCYTES NFR BLD AUTO: 8 % (ref 4–12)
NEUTROPHILS # BLD AUTO: 4.35 THOUSANDS/ΜL (ref 1.85–7.62)
NEUTS SEG NFR BLD AUTO: 56 % (ref 43–75)
NRBC BLD AUTO-RTO: 0 /100 WBCS
PLATELET # BLD AUTO: 296 THOUSANDS/UL (ref 149–390)
PMV BLD AUTO: 10.2 FL (ref 8.9–12.7)
POTASSIUM SERPL-SCNC: 4.4 MMOL/L (ref 3.5–5.3)
PROT SERPL-MCNC: 6.9 G/DL (ref 6.4–8.4)
RBC # BLD AUTO: 4.7 MILLION/UL (ref 3.81–5.12)
SODIUM SERPL-SCNC: 141 MMOL/L (ref 135–147)
TRIGL SERPL-MCNC: 138 MG/DL
TSH SERPL DL<=0.05 MIU/L-ACNC: 2.75 UIU/ML (ref 0.45–4.5)
WBC # BLD AUTO: 7.77 THOUSAND/UL (ref 4.31–10.16)

## 2024-10-09 PROCEDURE — 80061 LIPID PANEL: CPT

## 2024-10-09 PROCEDURE — 85025 COMPLETE CBC W/AUTO DIFF WBC: CPT

## 2024-10-09 PROCEDURE — 82306 VITAMIN D 25 HYDROXY: CPT

## 2024-10-09 PROCEDURE — 80053 COMPREHEN METABOLIC PANEL: CPT

## 2024-10-09 PROCEDURE — 36415 COLL VENOUS BLD VENIPUNCTURE: CPT

## 2024-10-09 PROCEDURE — 84443 ASSAY THYROID STIM HORMONE: CPT

## 2024-10-29 ENCOUNTER — TELEPHONE (OUTPATIENT)
Age: 69
End: 2024-10-29

## 2024-10-29 NOTE — TELEPHONE ENCOUNTER
"Pt called. Reports ongoing urinary symptoms \"for a while.\" Denies burning with urination or fever. Pt reports cloudy foul smelling urine and discomfort to Left side of back. Advised exam with Provider today or tomorrow. Scheduled for soonest available, tomorrow in the office. Pt verbalized understanding and is agreeable with plan.  "

## 2024-10-30 ENCOUNTER — OFFICE VISIT (OUTPATIENT)
Dept: FAMILY MEDICINE CLINIC | Facility: CLINIC | Age: 69
End: 2024-10-30
Payer: COMMERCIAL

## 2024-10-30 VITALS
DIASTOLIC BLOOD PRESSURE: 90 MMHG | WEIGHT: 195 LBS | HEIGHT: 66 IN | RESPIRATION RATE: 18 BRPM | TEMPERATURE: 97.8 F | OXYGEN SATURATION: 95 % | HEART RATE: 68 BPM | BODY MASS INDEX: 31.34 KG/M2 | SYSTOLIC BLOOD PRESSURE: 130 MMHG

## 2024-10-30 DIAGNOSIS — R39.9 UTI SYMPTOMS: Primary | ICD-10-CM

## 2024-10-30 LAB
SL AMB  POCT GLUCOSE, UA: NORMAL
SL AMB LEUKOCYTE ESTERASE,UA: NORMAL
SL AMB POCT BILIRUBIN,UA: NORMAL
SL AMB POCT BLOOD,UA: NORMAL
SL AMB POCT CLARITY,UA: CLEAR
SL AMB POCT COLOR,UA: YELLOW
SL AMB POCT KETONES,UA: NORMAL
SL AMB POCT NITRITE,UA: NORMAL
SL AMB POCT PH,UA: 7
SL AMB POCT SPECIFIC GRAVITY,UA: 1
SL AMB POCT URINE PROTEIN: NORMAL
SL AMB POCT UROBILINOGEN: NORMAL

## 2024-10-30 PROCEDURE — G2211 COMPLEX E/M VISIT ADD ON: HCPCS | Performed by: FAMILY MEDICINE

## 2024-10-30 PROCEDURE — 87086 URINE CULTURE/COLONY COUNT: CPT | Performed by: FAMILY MEDICINE

## 2024-10-30 PROCEDURE — 99213 OFFICE O/P EST LOW 20 MIN: CPT | Performed by: FAMILY MEDICINE

## 2024-10-30 PROCEDURE — 81002 URINALYSIS NONAUTO W/O SCOPE: CPT | Performed by: FAMILY MEDICINE

## 2024-10-30 NOTE — PROGRESS NOTES
"Ambulatory Visit  Name: Kiesha Pathak      : 1955      MRN: [id number]  Encounter Provider: Tiffany Marks DO  Encounter Date: 10/30/2024   Encounter department: [facility]    Assessment & Plan  UTI symptoms  Not typical symptoms. Urine dip in office is clean, will check culture to rule out. Suspect possible OAB vs urinary frequency changes based on fluid intake. Will follow up if symptoms persist and with culture results.  Orders:    POCT urine dip    Urine culture; Future       History of Present Illness     HPI  Here today for foul smelling urine, especially in the morning. Urine in the morning is dark. A few months of concentrated urine the morning. Had a sharp abdominal pain about a month ago while standing all day. Improved after she sat in the recliner for a bit. No dysuria but does have some increased frequency of urination intermittently with small volumes. Friend with recent urosepsis encouraged her to seek evaluation.     Mostly drinks water, 1-2 cups decaf tea daily. Sometimes wine in the evening.       Review of Systems        Objective     /90   Pulse 68   Temp 97.8 °F (36.6 °C)   Resp 18   Ht 5' 6\" (1.676 m)   Wt 88.5 kg (195 lb)   LMP  (LMP Unknown)   SpO2 95%   BMI 31.47 kg/m²     Physical Exam  Vitals reviewed.   Constitutional:       Appearance: Normal appearance.   Cardiovascular:      Rate and Rhythm: Normal rate.   Pulmonary:      Effort: Pulmonary effort is normal.   Abdominal:      General: Abdomen is flat. There is no distension.      Tenderness: There is no abdominal tenderness.   Skin:     General: Skin is warm and dry.   Neurological:      Mental Status: She is alert.   Psychiatric:         Mood and Affect: Mood normal.         Behavior: Behavior normal.         "

## 2024-10-31 LAB — BACTERIA UR CULT: NORMAL

## 2024-12-11 ENCOUNTER — TELEPHONE (OUTPATIENT)
Dept: ADMINISTRATIVE | Facility: OTHER | Age: 69
End: 2024-12-11

## 2024-12-11 NOTE — TELEPHONE ENCOUNTER
12/11/24 11:49 AM    Patient contacted to bring Advance Directive, POLST, or Living Will document to next scheduled pcp visit.[facility] Department spoke with patient/ caregiver.    Thank you.  Steph Bro MA  [facility]

## 2024-12-12 ENCOUNTER — OFFICE VISIT (OUTPATIENT)
Dept: FAMILY MEDICINE CLINIC | Facility: CLINIC | Age: 69
End: 2024-12-12
Payer: COMMERCIAL

## 2024-12-12 VITALS
OXYGEN SATURATION: 98 % | RESPIRATION RATE: 16 BRPM | HEART RATE: 59 BPM | HEIGHT: 66 IN | WEIGHT: 195 LBS | DIASTOLIC BLOOD PRESSURE: 88 MMHG | SYSTOLIC BLOOD PRESSURE: 138 MMHG | BODY MASS INDEX: 31.34 KG/M2 | TEMPERATURE: 97.5 F

## 2024-12-12 DIAGNOSIS — Z86.000 HISTORY OF LOBULAR CARCINOMA IN SITU (LCIS) OF BREAST: ICD-10-CM

## 2024-12-12 DIAGNOSIS — M25.551 RIGHT HIP PAIN: ICD-10-CM

## 2024-12-12 DIAGNOSIS — Z00.00 MEDICARE ANNUAL WELLNESS VISIT, SUBSEQUENT: Primary | ICD-10-CM

## 2024-12-12 DIAGNOSIS — K21.9 GASTROESOPHAGEAL REFLUX DISEASE WITHOUT ESOPHAGITIS: ICD-10-CM

## 2024-12-12 DIAGNOSIS — R32 URINARY INCONTINENCE, UNSPECIFIED TYPE: ICD-10-CM

## 2024-12-12 DIAGNOSIS — B35.1 ONYCHOMYCOSIS: ICD-10-CM

## 2024-12-12 DIAGNOSIS — I10 HYPERTENSION, UNSPECIFIED TYPE: ICD-10-CM

## 2024-12-12 DIAGNOSIS — J30.9 ALLERGIC RHINITIS, UNSPECIFIED SEASONALITY, UNSPECIFIED TRIGGER: ICD-10-CM

## 2024-12-12 DIAGNOSIS — L30.9 ECZEMA, UNSPECIFIED TYPE: ICD-10-CM

## 2024-12-12 DIAGNOSIS — E78.2 MIXED HYPERLIPIDEMIA: ICD-10-CM

## 2024-12-12 PROCEDURE — 99214 OFFICE O/P EST MOD 30 MIN: CPT | Performed by: FAMILY MEDICINE

## 2024-12-12 PROCEDURE — G0439 PPPS, SUBSEQ VISIT: HCPCS | Performed by: FAMILY MEDICINE

## 2024-12-12 RX ORDER — RABEPRAZOLE SODIUM 20 MG/1
20 TABLET, DELAYED RELEASE ORAL DAILY
Qty: 90 TABLET | Refills: 3 | Status: SHIPPED | OUTPATIENT
Start: 2024-12-12

## 2024-12-12 RX ORDER — METOPROLOL SUCCINATE 50 MG/1
50 TABLET, EXTENDED RELEASE ORAL DAILY
Qty: 90 TABLET | Refills: 3 | Status: SHIPPED | OUTPATIENT
Start: 2024-12-12

## 2024-12-12 RX ORDER — FLUTICASONE PROPIONATE 50 MCG
2 SPRAY, SUSPENSION (ML) NASAL DAILY
Qty: 48 ML | Refills: 3 | Status: SHIPPED | OUTPATIENT
Start: 2024-12-12

## 2024-12-12 RX ORDER — FLUOCINONIDE 0.5 MG/G
CREAM TOPICAL 2 TIMES DAILY
Qty: 30 G | Refills: 2 | Status: SHIPPED | OUTPATIENT
Start: 2024-12-12

## 2024-12-12 RX ORDER — CICLOPIROX 80 MG/ML
SOLUTION TOPICAL
Qty: 6 ML | Refills: 5 | Status: SHIPPED | OUTPATIENT
Start: 2024-12-12

## 2024-12-12 NOTE — PROGRESS NOTES
Name: Kiesha Pathak      : 1955      MRN: [id number]  Encounter Provider: Soila Valdes MD  Encounter Date: 2024   Encounter department: [facility]    Assessment & Plan  Medicare annual wellness visit, subsequent         Mixed hyperlipidemia  Total cholesterol 222 with LDL of 149  Patient firmly prefers to hold off prescription lipid-lowering therapy  She will continue low-fat low-cholesterol diet  May try bergamot supplement  May try red yeast rice, informed patient that this supplement is essentially a weaker version of statin therapy and will require lab monitoring.  Orders:  •  CBC and differential; Future  •  Comprehensive metabolic panel; Future  •  Lipid Panel with Direct LDL reflex; Future  •  TSH, 3rd generation; Future    Right hip pain  History of DJD.  Proceed with x-ray and schedule evaluation with orthopedic surgery  Orders:  •  XR hip/pelv 2-3 vws right if performed; Future  •  Ambulatory referral to Orthopedic Surgery; Future    Hypertension, unspecified type  Continue metoprolol.  Orders:  •  metoprolol succinate (TOPROL-XL) 50 mg 24 hr tablet; Take 1 tablet (50 mg total) by mouth daily  •  CBC and differential; Future  •  Comprehensive metabolic panel; Future    Allergic rhinitis, unspecified seasonality, unspecified trigger    Orders:  •  fluticasone (FLONASE) 50 mcg/act nasal spray; 2 sprays into each nostril daily    Gastroesophageal reflux disease without esophagitis  Continue Aciphex 20 mg daily  Orders:  •  RABEprazole (ACIPHEX) 20 MG tablet; Take 1 tablet (20 mg total) by mouth daily    Onychomycosis  Patient uses Penlac with good results  Orders:  •  ciclopirox (PENLAC) 8 % solution; Apply to affected toenails once a day, remove once a week with alcohol, then start all over    Eczema, unspecified type  Continue fluocinonide cream PRN eczema  Orders:  •  fluocinonide (LIDEX) 0.05 % cream; Apply topically 2 (two) times a day    History of  lobular carcinoma in situ (LCIS) of breast  Right lumpectomy October 2018  Patient sabine under ongoing care of [facility] surgical oncology, follow-up June 2025  Normal mammogram September 2024  Breast exam performed in the office today-unremarkable         Urinary incontinence, unspecified type  Referral to uro-GYN  May benefit from pelvic PT          Preventive health issues were discussed with patient, and age appropriate screening tests were ordered as noted in patient's After Visit Summary. Personalized health advice and appropriate referrals for health education or preventive services given if needed, as noted in patient's After Visit Summary.    History of Present Illness     AWV and  follow up  Patient feels well.  Offers no complaints.  Medications updated  Last mammo  9/24  She is scheduled for follow-up with surgical oncology in June 2025   Breast exam will be performed by PCP once a year and surgical oncology once a year (every 6 months)     Patient was diagnosed with right foot fibroma, seen by Dr. Guerrier in the past and was not considered a good surgical candidate.  She reports worsening of right hip pain lately.    Results of recent blood work performed in October 2024 reviewed.  Total cholesterol 222 with LDL of 149.  Patient firmly prefers to avoid lipid-lowering therapy (statins) she might be interested in trial of red yeast rice.  Rest of the blood work including CBC, CMP, TSH and vitamin D level is normal    Patient reports intermittent symptoms of urinary incontinence.  Would like to pursue evaluation.       Patient Care Team:  Soila Valdes MD as PCP - General  Soila Valdes MD as PCP - [facility]CP-[facility]  MD eJlly Cisse MD (Colon and Rectal Surgery)  Dyllan Moraes MD as Surgeon (Surgical Oncology)  Eduardo Connor MD (Obstetrics and Gynecology)  PHILL Mauricio (Oncology)    Review of Systems   Constitutional: Negative.     HENT: Negative.     Eyes: Negative.    Respiratory: Negative.     Cardiovascular: Negative.    Gastrointestinal: Negative.    Endocrine: Negative.    Genitourinary: Negative.    Musculoskeletal:  Positive for arthralgias.   Allergic/Immunologic: Negative.    Neurological: Negative.    Psychiatric/Behavioral: Negative.       Medical History Reviewed by provider this encounter:  Tobacco  Allergies  Meds  Problems  Med Hx  Surg Hx  Fam Hx       Annual Wellness Visit Questionnaire   Kiesha is here for her Subsequent Wellness visit. Last Medicare Wellness visit information reviewed, patient interviewed and updates made to the record today.      Health Risk Assessment:   Patient rates overall health as good. Patient feels that their physical health rating is same. Patient is satisfied with their life. Eyesight was rated as same. Hearing was rated as same. Patient feels that their emotional and mental health rating is same. Patients states they are never, rarely angry. Patient states they are sometimes unusually tired/fatigued. Pain experienced in the last 7 days has been some. Patient's pain rating has been 5/10. Patient states that she has experienced no weight loss or gain in last 6 months.     Depression Screening:   PHQ-2 Score: 0      Fall Risk Screening:   In the past year, patient has experienced: no history of falling in past year      Urinary Incontinence Screening:   Patient has leaked urine accidently in the last six months.     Home Safety:  Patient does not have trouble with stairs inside or outside of their home. Patient has working smoke alarms and has no working carbon monoxide detector. Home safety hazards include: none.     Nutrition:   Current diet is Regular.     Medications:   Patient is currently taking over-the-counter supplements. OTC medications include: see medication list. Patient is able to manage medications.     Activities of Daily Living (ADLs)/Instrumental Activities of Daily  Living (IADLs):   Walk and transfer into and out of bed and chair?: Yes  Dress and groom yourself?: Yes    Bathe or shower yourself?: Yes    Feed yourself? Yes  Do your laundry/housekeeping?: Yes  Manage your money, pay your bills and track your expenses?: Yes  Make your own meals?: Yes    Do your own shopping?: Yes    Previous Hospitalizations:   Any hospitalizations or ED visits within the last 12 months?: No      Advance Care Planning:   Living will: Yes    Durable POA for healthcare: Yes    Advanced directive: Yes      Cognitive Screening:   Provider or family/friend/caregiver concerned regarding cognition?: No    PREVENTIVE SCREENINGS      Cardiovascular Screening:    General: Screening Current      Diabetes Screening:     General: Screening Current      Colorectal Cancer Screening:     General: Screening Current      Breast Cancer Screening:     General: Screening Current      Cervical Cancer Screening:    General: Screening Not Indicated and Screening Current      Abdominal Aortic Aneurysm (AAA) Screening:        General: Screening Not Indicated      Lung Cancer Screening:     General: Screening Not Indicated      Hepatitis C Screening:    General: Screening Not Indicated    Screening, Brief Intervention, and Referral to Treatment (SBIRT)    Screening  Typical number of drinks in a day: 1  Typical number of drinks in a week: 3  Interpretation: Low risk drinking behavior.    Single Item Drug Screening:  How often have you used an illegal drug (including marijuana) or a prescription medication for non-medical reasons in the past year? never    Single Item Drug Screen Score: 0  Interpretation: Negative screen for possible drug use disorder    Brief Intervention  Alcohol & drug use screenings were reviewed. No concerns regarding substance use disorder identified.     Other Counseling Topics:   Regular weightbearing exercise and calcium and vitamin D intake.     Social Drivers of Health     Financial Resource  "Strain: Low Risk  (12/11/2023)    Overall Financial Resource Strain (CARDIA)    • Difficulty of Paying Living Expenses: Not very hard   Food Insecurity: No Food Insecurity (12/9/2024)    Hunger Vital Sign    • Worried About Running Out of Food in the Last Year: Never true    • Ran Out of Food in the Last Year: Never true   Transportation Needs: No Transportation Needs (12/9/2024)    PRAPARE - Transportation    • Lack of Transportation (Medical): No    • Lack of Transportation (Non-Medical): No   Housing Stability: Low Risk  (12/9/2024)    Housing Stability Vital Sign    • Unable to Pay for Housing in the Last Year: No    • Number of Times Moved in the Last Year: 0    • Homeless in the Last Year: No   Utilities: Not At Risk (12/9/2024)    [facility] Utilities    • Threatened with loss of utilities: No     No results found.    Objective   /88 (BP Location: Left arm, Patient Position: Sitting, Cuff Size: Large)   Pulse 59   Temp 97.5 °F (36.4 °C) (Temporal)   Resp 16   Ht 5' 6\" (1.676 m)   Wt 88.5 kg (195 lb)   LMP  (LMP Unknown)   SpO2 98%   BMI 31.47 kg/m²     Physical Exam  Vitals and nursing note reviewed.   Constitutional:       General: She is not in acute distress.     Appearance: Normal appearance. She is well-developed. She is not ill-appearing.   HENT:      Head: Normocephalic and atraumatic.   Eyes:      General: No scleral icterus.     Conjunctiva/sclera: Conjunctivae normal.   Neck:      Vascular: No carotid bruit.   Cardiovascular:      Rate and Rhythm: Normal rate and regular rhythm.      Heart sounds: Normal heart sounds. No murmur heard.  Pulmonary:      Effort: Pulmonary effort is normal. No respiratory distress.      Breath sounds: Normal breath sounds.   Chest:   Breasts:     Breasts are symmetrical.      Right: Normal. No swelling, inverted nipple, mass, skin change or tenderness.      Left: Normal. No swelling, inverted nipple, mass, skin change or tenderness.   Abdominal:      General: " Bowel sounds are normal. There is no abdominal bruit.      Palpations: Abdomen is soft.      Tenderness: There is no abdominal tenderness.   Musculoskeletal:      Cervical back: Neck supple. No rigidity.      Right lower leg: No edema.      Left lower leg: No edema.   Lymphadenopathy:      Upper Body:      Right upper body: No axillary adenopathy.      Left upper body: No axillary adenopathy.   Skin:     General: Skin is warm.      Comments: Small patch of eczema right anterior chest wall   Neurological:      General: No focal deficit present.      Mental Status: She is alert and oriented to person, place, and time.   Psychiatric:         Behavior: Behavior normal.

## 2024-12-12 NOTE — PATIENT INSTRUCTIONS
Bergamot supplements for elevated cholesterol  Please proceed with right hip x-ray   If you decide to try red yeast rice, please do it with caution, take it only to 3 times a week and keep me posted so we can monitor your blood work  [facility]

## 2024-12-12 NOTE — ASSESSMENT & PLAN NOTE
Total cholesterol 222 with LDL of 149  Patient firmly prefers to hold off prescription lipid-lowering therapy  She will continue low-fat low-cholesterol diet  May try bergamot supplement  May try red yeast rice, informed patient that this supplement is essentially a weaker version of statin therapy and will require lab monitoring.  Orders:  •  CBC and differential; Future  •  Comprehensive metabolic panel; Future  •  Lipid Panel with Direct LDL reflex; Future  •  TSH, 3rd generation; Future

## 2024-12-17 PROBLEM — L30.9 ECZEMA: Status: ACTIVE | Noted: 2024-12-17

## 2024-12-17 PROBLEM — B35.1 ONYCHOMYCOSIS: Status: ACTIVE | Noted: 2024-12-17

## 2024-12-17 PROBLEM — R32 URINARY INCONTINENCE: Status: ACTIVE | Noted: 2024-12-17

## 2024-12-17 PROBLEM — M25.551 RIGHT HIP PAIN: Status: ACTIVE | Noted: 2024-12-17

## 2024-12-17 NOTE — ASSESSMENT & PLAN NOTE
Continue Aciphex 20 mg daily  Orders:  •  RABEprazole (ACIPHEX) 20 MG tablet; Take 1 tablet (20 mg total) by mouth daily

## 2024-12-17 NOTE — ASSESSMENT & PLAN NOTE
Continue metoprolol.  Orders:  •  metoprolol succinate (TOPROL-XL) 50 mg 24 hr tablet; Take 1 tablet (50 mg total) by mouth daily  •  CBC and differential; Future  •  Comprehensive metabolic panel; Future

## 2024-12-18 NOTE — ASSESSMENT & PLAN NOTE
Right lumpectomy October 2018  Patient sabine under ongoing care of [facility] surgical oncology, follow-up June 2025  Normal mammogram September 2024  Breast exam performed in the office today-unremarkable

## 2024-12-18 NOTE — ASSESSMENT & PLAN NOTE
Continue fluocinonide cream PRN eczema  Orders:  •  fluocinonide (LIDEX) 0.05 % cream; Apply topically 2 (two) times a day

## 2024-12-18 NOTE — ASSESSMENT & PLAN NOTE
Patient uses Penlac with good results  Orders:  •  ciclopirox (PENLAC) 8 % solution; Apply to affected toenails once a day, remove once a week with alcohol, then start all over

## 2024-12-18 NOTE — ASSESSMENT & PLAN NOTE
History of DJD.  Proceed with x-ray and schedule evaluation with orthopedic surgery  Orders:  •  XR hip/pelv 2-3 vws right if performed; Future  •  Ambulatory referral to Orthopedic Surgery; Future

## 2024-12-19 ENCOUNTER — APPOINTMENT (OUTPATIENT)
Dept: RADIOLOGY | Age: 69
End: 2024-12-19
Payer: COMMERCIAL

## 2024-12-19 DIAGNOSIS — M25.551 RIGHT HIP PAIN: ICD-10-CM

## 2024-12-19 PROCEDURE — 73502 X-RAY EXAM HIP UNI 2-3 VIEWS: CPT

## 2024-12-26 ENCOUNTER — RESULTS FOLLOW-UP (OUTPATIENT)
Dept: FAMILY MEDICINE CLINIC | Facility: CLINIC | Age: 69
End: 2024-12-26

## 2024-12-26 DIAGNOSIS — M25.551 RIGHT HIP PAIN: Primary | ICD-10-CM

## 2025-01-29 ENCOUNTER — VBI (OUTPATIENT)
Dept: ADMINISTRATIVE | Facility: OTHER | Age: 70
End: 2025-01-29

## 2025-01-29 NOTE — TELEPHONE ENCOUNTER
01/29/25 11:57 AM     Chart reviewed for Blood Pressure was/were submitted to the patient's insurance.     La Nena Rosario MA   [facility]

## 2025-02-05 ENCOUNTER — OFFICE VISIT (OUTPATIENT)
Dept: FAMILY MEDICINE CLINIC | Facility: CLINIC | Age: 70
End: 2025-02-05
Payer: COMMERCIAL

## 2025-02-05 VITALS
HEART RATE: 58 BPM | BODY MASS INDEX: 30.7 KG/M2 | SYSTOLIC BLOOD PRESSURE: 138 MMHG | HEIGHT: 66 IN | RESPIRATION RATE: 16 BRPM | TEMPERATURE: 97.6 F | DIASTOLIC BLOOD PRESSURE: 82 MMHG | OXYGEN SATURATION: 100 % | WEIGHT: 191 LBS

## 2025-02-05 DIAGNOSIS — J06.9 UPPER RESPIRATORY TRACT INFECTION, UNSPECIFIED TYPE: Primary | ICD-10-CM

## 2025-02-05 PROCEDURE — G2211 COMPLEX E/M VISIT ADD ON: HCPCS | Performed by: FAMILY MEDICINE

## 2025-02-05 PROCEDURE — 99213 OFFICE O/P EST LOW 20 MIN: CPT | Performed by: FAMILY MEDICINE

## 2025-02-05 NOTE — PROGRESS NOTES
"Name: Kiesha Pathak      : 1955      MRN: [id number]  Encounter Provider: Tiffany Marks DO  Encounter Date: 2025   Encounter department: [facility]  :  Assessment & Plan  Upper respiratory tract infection, unspecified type  Symptoms mild and exam benign. Recommend symptom directed treatment and supportive care. Follow up if not improved.               History of Present Illness   Earache     URI   Associated symptoms include ear pain.     Had tooth extracted one week ago, then a few days ago developed congestion, myalgias. Took some Tylenol. Had a fever at onset until yesterday, Tmax 100.9. Could not sleep that night due to diffuse pain, then started taking Mucinex DM and Aleve but did not help. Was having pain shooting into the left ear, did have a twinge of ear pain when R lower tooth was being extracted probably because of mandibular manipulation. Feels a little off balance.     Review of Systems   HENT:  Positive for ear pain.        Objective   /82 (BP Location: Right arm, Patient Position: Sitting, Cuff Size: Standard)   Pulse 58   Temp 97.6 °F (36.4 °C) (Temporal)   Resp 16   Ht 5' 6\" (1.676 m)   Wt 86.6 kg (191 lb)   LMP  (LMP Unknown)   SpO2 100%   BMI 30.83 kg/m²      Physical Exam  Vitals reviewed.   Constitutional:       Appearance: Normal appearance.   HENT:      Right Ear: Tympanic membrane, ear canal and external ear normal.      Left Ear: Tympanic membrane, ear canal and external ear normal.      Nose: Congestion present.      Mouth/Throat:      Mouth: Mucous membranes are moist.   Eyes:      Extraocular Movements: Extraocular movements intact.      Conjunctiva/sclera: Conjunctivae normal.   Cardiovascular:      Rate and Rhythm: Normal rate and regular rhythm.      Heart sounds: Normal heart sounds.   Pulmonary:      Effort: Pulmonary effort is normal.   Abdominal:      General: Abdomen is flat.   Skin:     General: Skin is warm and dry. "   Neurological:      Mental Status: She is alert.   Psychiatric:         Mood and Affect: Mood normal.         Behavior: Behavior normal.

## 2025-03-28 ENCOUNTER — TELEPHONE (OUTPATIENT)
Age: 70
End: 2025-03-28

## 2025-03-28 ENCOUNTER — APPOINTMENT (OUTPATIENT)
Dept: RADIOLOGY | Age: 70
End: 2025-03-28
Payer: COMMERCIAL

## 2025-03-28 DIAGNOSIS — G89.29 CHRONIC LOW BACK PAIN, UNSPECIFIED BACK PAIN LATERALITY, UNSPECIFIED WHETHER SCIATICA PRESENT: ICD-10-CM

## 2025-03-28 DIAGNOSIS — G89.29 CHRONIC LOW BACK PAIN, UNSPECIFIED BACK PAIN LATERALITY, UNSPECIFIED WHETHER SCIATICA PRESENT: Primary | ICD-10-CM

## 2025-03-28 DIAGNOSIS — M54.50 CHRONIC LOW BACK PAIN, UNSPECIFIED BACK PAIN LATERALITY, UNSPECIFIED WHETHER SCIATICA PRESENT: Primary | ICD-10-CM

## 2025-03-28 DIAGNOSIS — M54.50 CHRONIC LOW BACK PAIN, UNSPECIFIED BACK PAIN LATERALITY, UNSPECIFIED WHETHER SCIATICA PRESENT: ICD-10-CM

## 2025-03-28 PROCEDURE — 72110 X-RAY EXAM L-2 SPINE 4/>VWS: CPT

## 2025-03-28 NOTE — TELEPHONE ENCOUNTER
Stacie's back issues persist.Her Chiropractor is requesting a spine xray and  asking if PCP can order spine xray so it can go through Stacie's insurance.    If this is possible, please let Stacie know.      Her Chiropractor is Dr. Remigio Francis and his office phone number is: [phone]

## 2025-03-30 ENCOUNTER — RESULTS FOLLOW-UP (OUTPATIENT)
Dept: FAMILY MEDICINE CLINIC | Facility: CLINIC | Age: 70
End: 2025-03-30

## 2025-04-07 ENCOUNTER — OFFICE VISIT (OUTPATIENT)
Dept: FAMILY MEDICINE CLINIC | Facility: CLINIC | Age: 70
End: 2025-04-07
Payer: COMMERCIAL

## 2025-04-07 VITALS
WEIGHT: 188.8 LBS | BODY MASS INDEX: 30.34 KG/M2 | HEART RATE: 58 BPM | DIASTOLIC BLOOD PRESSURE: 86 MMHG | OXYGEN SATURATION: 98 % | SYSTOLIC BLOOD PRESSURE: 142 MMHG | HEIGHT: 66 IN | RESPIRATION RATE: 16 BRPM | TEMPERATURE: 97.3 F

## 2025-04-07 DIAGNOSIS — K57.90 DIVERTICULAR DISEASE: ICD-10-CM

## 2025-04-07 DIAGNOSIS — R10.9 LEFT SIDED ABDOMINAL PAIN: Primary | ICD-10-CM

## 2025-04-07 PROCEDURE — 99214 OFFICE O/P EST MOD 30 MIN: CPT | Performed by: FAMILY MEDICINE

## 2025-04-07 PROCEDURE — G2211 COMPLEX E/M VISIT ADD ON: HCPCS | Performed by: FAMILY MEDICINE

## 2025-04-07 RX ORDER — DICYCLOMINE HCL 20 MG
20 TABLET ORAL 2 TIMES DAILY PRN
Qty: 20 TABLET | Refills: 0 | Status: SHIPPED | OUTPATIENT
Start: 2025-04-07

## 2025-04-07 NOTE — PROGRESS NOTES
Name: Kiesha Pathak      : 1955      MRN: [id number]  Encounter Provider: Soila Valdes MD  Encounter Date: 2025   Encounter department: [facility]    Assessment & Plan  Diverticular disease    Orders:  •  CT abdomen pelvis w contrast; Future  •  dicyclomine (BENTYL) 20 mg tablet; Take 1 tablet (20 mg total) by mouth 2 (two) times a day as needed (Abdominal bloating/pain)    Left sided abdominal pain  Patient presents for evaluation of left-sided abdominal pain x 1 months.  Originated in the entire lower abdomen and now localized left upper quadrant and left lower quadrant.  Attacks, radiates to the left side but not to the left flank.  Afebrile with no dysuria.  Reports constipation.  Exam is unremarkable.  Etiology is not entirely clear.  Rule out diverticulitis, rule out IBS, rule out constipation.  Proceed with CT abdomen and pelvis.  Discussed FODMAP diet.  Recommended MiraLAX daily/daily for constipation.  Trial of as needed dicyclomine.  Further recommendations to follow based on CT results  Orders:  •  CT abdomen pelvis w contrast; Future       Patient Instructions   Please schedule CT abdomen and pelvis [phone]  Please consider trial of FODMAP diet  You may try dicyclomine/Bentyl as needed for left-sided abdominal pain/bloating  We will follow-up pending CT results  If no pertinent findings on CT and no improvement with treatment we will proceed with GI consult and colonoscopy  If you are constipated for 1 to 2 days-please use MiraLAX    Return if symptoms worsen or fail to improve.    History of Present Illness     Patient is here today for evaluation of abdominal pain.  She is accompanied by her .  She reports interim development fairly generalized lower abdominal pain that radiated from the right hip to the right lower quadrant to the left lower quadrant subsequently pain has localized in the left side affecting left upper quadrant, left lower  quadrant and occasionally radiating to the waist area.  Patient denies flank pain.     Patient is under care of chiropractor.  She is being treated for right hip pain and lower back pain, both have significantly improved.      Patient denies appetite or weight changes.  No nausea vomiting or diarrhea.  She reports occasional belching.  She admits to constipation.  Afebrile.  Normal urination.  Last colonoscopy in , history of diverticulosis, no history of diverticulitis.    Pain is vague, comes and goes, no particular pattern.  No association with meals.  Patient recalls feeling worse a few days ago after eating sticky bun with nuts.      Review of Systems   Constitutional: Negative.    HENT: Negative.     Respiratory: Negative.     Cardiovascular: Negative.    Gastrointestinal:  Positive for abdominal pain and constipation.        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 Laurent   • COLONOSCOPY  2019   • ESOPHAGOGASTRIC FUNDOPLASTY      Nissen fundoplication   • ESOPHAGOGASTRODUODENOSCOPY     • FOOT SURGERY Left     plantar fascia    • FUNDOPLICATION TRANSORAL     • GYNECOLOGIC CRYOSURGERY     • HERNIA REPAIR     • MAMMO (HISTORICAL) Bilateral  08/28/2018    check right   • MAMMO NEEDLE LOCALIZATION RIGHT (ALL INC) Right 10/23/2018   • MAMMO STEREOTACTIC BREAST BIOPSY RIGHT (ALL INC) Right 8/29/2018   • POLYPECTOMY  2008    ulcerated polypectomy    • VT PERQ DEVICE PLACEMENT BREAST LOC 1ST LES W/GDNCE Right 10/23/2018    Procedure: BREAST LUMPECTOMY; BREAST NEEDLE LOCATION (NEEDLE LOC AT 0830) ;  Surgeon: Dyllan Moraes MD;  Location: [facility];  Service: Surgical Oncology   • TONSILLECTOMY AND ADENOIDECTOMY     • TUBAL LIGATION     • VAGINAL DELIVERY      1980, 1982     Family History   Problem Relation Age of Onset   • Diabetes Mother    • Heart disease Mother    • Osteoporosis Mother    • Hypertension Mother    • Arthritis Mother    • Heart disease Father    • Skin cancer Father    • Hyperlipidemia Father    • Arthritis Father    • Hearing loss Father    • No Known Problems Sister    • No Known Problems Daughter    • Breast cancer Maternal Grandmother         unknonw age   • No Known Problems Maternal Grandfather    • Cervical cancer Paternal Grandmother         unknown age   • No Known Problems Paternal Grandfather    • No Known Problems Maternal Aunt    • No Known Problems Maternal Aunt    • No Known Problems Maternal Aunt    • Breast cancer Paternal Aunt         unknown age   • No Known Problems Paternal Aunt    • No Known Problems Paternal Aunt    • Cancer Family      Social History     Tobacco Use   • Smoking status: Never   • Smokeless tobacco: Never   Vaping Use   • Vaping status: Never Used   Substance and Sexual Activity   • Alcohol use: Yes     Alcohol/week: 1.0 standard drink of alcohol     Types: 1 Glasses of wine per week     Comment: Social  1 per/day   • Drug use: No   • Sexual activity: Not Currently     Partners: Male     Birth control/protection: Post-menopausal     Current Outpatient Medications on File Prior to Visit   Medication Sig   • Calcium-Magnesium-Vitamin D (CITRACAL CALCIUM+D PO) Take by mouth   • Cholecalciferol (VITAMIN D)  "2000 units CAPS Take 1 tablet by mouth daily   • ciclopirox (PENLAC) 8 % solution Apply to affected toenails once a day, remove once a week with alcohol, then start all over   • docusate sodium (COLACE) 100 mg capsule Take 1 capsule by mouth daily     • fluocinonide (LIDEX) 0.05 % cream Apply topically 2 (two) times a day   • fluticasone (FLONASE) 50 mcg/act nasal spray 2 sprays into each nostril daily   • KRILL OIL PO Take 1,600 mg by mouth 2 capsules daily   • Lido-Menthol-Methyl Sal-Camph (CBD KINGS EX) Apply topically   • metoprolol succinate (TOPROL-XL) 50 mg 24 hr tablet Take 1 tablet (50 mg total) by mouth daily   • Multiple Vitamins-Minerals (WOMENS 50+ MULTI VITAMIN/MIN PO) Take by mouth   • Probiotic Product (PROBIOTIC BLEND PO) Take 1 capsule by mouth Daily at 2am   • RABEprazole (ACIPHEX) 20 MG tablet Take 1 tablet (20 mg total) by mouth daily     Allergies   Allergen Reactions   • Dyazide [Hydrochlorothiazide W-Triamterene] Other (See Comments)     myalgias   • Ace Inhibitors Cough   • Adhesive [Medical Tape] Itching     Immunization History   Administered Date(s) Administered   • INFLUENZA 09/15/2014, 11/14/2016   • Influenza Quadrivalent Preservative Free 3 years and older IM 11/14/2016, 09/16/2017   • Influenza, injectable, quadrivalent, preservative free 0.5 mL 10/26/2019, 09/12/2020   • Influenza, recombinant, quadrivalent,injectable, preservative free 09/06/2018   • Influenza, seasonal, injectable 10/09/2013, 09/15/2014, 10/02/2015     Objective   /86 (BP Location: Right arm, Patient Position: Sitting, Cuff Size: Large)   Pulse 58   Temp (!) 97.3 °F (36.3 °C) (Temporal)   Resp 16   Ht 5' 6\" (1.676 m)   Wt 85.6 kg (188 lb 12.8 oz)   LMP  (LMP Unknown)   SpO2 98%   BMI 30.47 kg/m²   Vitals:    04/07/25 1342 04/07/25 1424   BP: 154/90 142/86   BP Location: Right arm Right arm   Patient Position: Sitting Sitting   Cuff Size: Standard Large   Pulse: 58    Resp: 16    Temp: (!) 97.3 °F " "(36.3 °C)    TempSrc: Temporal    SpO2: 98%    Weight: 85.6 kg (188 lb 12.8 oz)    Height: 5' 6\" (1.676 m)        Physical Exam  Vitals and nursing note reviewed.   Constitutional:       General: She is not in acute distress.     Appearance: Normal appearance. She is well-developed. She is not ill-appearing.   HENT:      Head: Normocephalic and atraumatic.   Eyes:      General: No scleral icterus.     Conjunctiva/sclera: Conjunctivae normal.   Neck:      Vascular: No carotid bruit.   Cardiovascular:      Rate and Rhythm: Normal rate and regular rhythm.      Heart sounds: Normal heart sounds. No murmur heard.  Pulmonary:      Effort: Pulmonary effort is normal. No respiratory distress.      Breath sounds: Normal breath sounds.   Abdominal:      General: Bowel sounds are normal. There is no distension or abdominal bruit.      Palpations: Abdomen is soft.      Tenderness: There is no abdominal tenderness. There is no guarding or rebound.   Musculoskeletal:      Cervical back: Neck supple. No rigidity.      Right lower leg: No edema.      Left lower leg: No edema.   Skin:     General: Skin is warm.   Neurological:      General: No focal deficit present.      Mental Status: She is alert and oriented to person, place, and time.   Psychiatric:         Mood and Affect: Mood normal.         Behavior: Behavior normal.         Thought Content: Thought content normal.         "

## 2025-04-07 NOTE — PATIENT INSTRUCTIONS
Please schedule CT abdomen and pelvis [phone]  Please consider trial of FODMAP diet  You may try dicyclomine/Bentyl as needed for left-sided abdominal pain/bloating  We will follow-up pending CT results  If no pertinent findings on CT and no improvement with treatment we will proceed with GI consult and colonoscopy  If you are constipated for 1 to 2 days-please use MiraLAX

## 2025-04-08 ENCOUNTER — APPOINTMENT (OUTPATIENT)
Dept: LAB | Age: 70
End: 2025-04-08
Payer: COMMERCIAL

## 2025-04-08 DIAGNOSIS — I10 HYPERTENSION, UNSPECIFIED TYPE: ICD-10-CM

## 2025-04-08 DIAGNOSIS — E78.2 MIXED HYPERLIPIDEMIA: ICD-10-CM

## 2025-04-08 LAB
ALBUMIN SERPL BCG-MCNC: 4.7 G/DL (ref 3.5–5)
ALP SERPL-CCNC: 71 U/L (ref 34–104)
ALT SERPL W P-5'-P-CCNC: 44 U/L (ref 7–52)
ANION GAP SERPL CALCULATED.3IONS-SCNC: 11 MMOL/L (ref 4–13)
AST SERPL W P-5'-P-CCNC: 30 U/L (ref 13–39)
BASOPHILS # BLD AUTO: 0.06 THOUSANDS/ÂΜL (ref 0–0.1)
BASOPHILS NFR BLD AUTO: 1 % (ref 0–1)
BILIRUB SERPL-MCNC: 0.75 MG/DL (ref 0.2–1)
BUN SERPL-MCNC: 12 MG/DL (ref 5–25)
CALCIUM SERPL-MCNC: 9.9 MG/DL (ref 8.4–10.2)
CHLORIDE SERPL-SCNC: 104 MMOL/L (ref 96–108)
CHOLEST SERPL-MCNC: 222 MG/DL (ref ?–200)
CO2 SERPL-SCNC: 28 MMOL/L (ref 21–32)
CREAT SERPL-MCNC: 0.71 MG/DL (ref 0.6–1.3)
EOSINOPHIL # BLD AUTO: 0.28 THOUSAND/ÂΜL (ref 0–0.61)
EOSINOPHIL NFR BLD AUTO: 4 % (ref 0–6)
ERYTHROCYTE [DISTWIDTH] IN BLOOD BY AUTOMATED COUNT: 13.2 % (ref 11.6–15.1)
GFR SERPL CREATININE-BSD FRML MDRD: 88 ML/MIN/1.73SQ M
GLUCOSE P FAST SERPL-MCNC: 106 MG/DL (ref 65–99)
HCT VFR BLD AUTO: 47.3 % (ref 34.8–46.1)
HDLC SERPL-MCNC: 51 MG/DL
HGB BLD-MCNC: 15.5 G/DL (ref 11.5–15.4)
IMM GRANULOCYTES # BLD AUTO: 0.02 THOUSAND/UL (ref 0–0.2)
IMM GRANULOCYTES NFR BLD AUTO: 0 % (ref 0–2)
LDLC SERPL CALC-MCNC: 149 MG/DL (ref 0–100)
LYMPHOCYTES # BLD AUTO: 1.93 THOUSANDS/ÂΜL (ref 0.6–4.47)
LYMPHOCYTES NFR BLD AUTO: 30 % (ref 14–44)
MCH RBC QN AUTO: 31.1 PG (ref 26.8–34.3)
MCHC RBC AUTO-ENTMCNC: 32.8 G/DL (ref 31.4–37.4)
MCV RBC AUTO: 95 FL (ref 82–98)
MONOCYTES # BLD AUTO: 0.5 THOUSAND/ÂΜL (ref 0.17–1.22)
MONOCYTES NFR BLD AUTO: 8 % (ref 4–12)
NEUTROPHILS # BLD AUTO: 3.63 THOUSANDS/ÂΜL (ref 1.85–7.62)
NEUTS SEG NFR BLD AUTO: 57 % (ref 43–75)
NRBC BLD AUTO-RTO: 0 /100 WBCS
PLATELET # BLD AUTO: 280 THOUSANDS/UL (ref 149–390)
PMV BLD AUTO: 10 FL (ref 8.9–12.7)
POTASSIUM SERPL-SCNC: 3.9 MMOL/L (ref 3.5–5.3)
PROT SERPL-MCNC: 7.4 G/DL (ref 6.4–8.4)
RBC # BLD AUTO: 4.98 MILLION/UL (ref 3.81–5.12)
SODIUM SERPL-SCNC: 143 MMOL/L (ref 135–147)
TRIGL SERPL-MCNC: 109 MG/DL (ref ?–150)
TSH SERPL DL<=0.05 MIU/L-ACNC: 2.05 UIU/ML (ref 0.45–4.5)
WBC # BLD AUTO: 6.42 THOUSAND/UL (ref 4.31–10.16)

## 2025-04-08 PROCEDURE — 84443 ASSAY THYROID STIM HORMONE: CPT

## 2025-04-08 PROCEDURE — 85025 COMPLETE CBC W/AUTO DIFF WBC: CPT

## 2025-04-08 PROCEDURE — 80053 COMPREHEN METABOLIC PANEL: CPT

## 2025-04-08 PROCEDURE — 36415 COLL VENOUS BLD VENIPUNCTURE: CPT

## 2025-04-08 PROCEDURE — 80061 LIPID PANEL: CPT

## 2025-04-09 ENCOUNTER — HOSPITAL ENCOUNTER (OUTPATIENT)
Dept: RADIOLOGY | Age: 70
Discharge: HOME | End: 2025-04-09
Payer: COMMERCIAL

## 2025-04-09 DIAGNOSIS — K57.90 DIVERTICULAR DISEASE: ICD-10-CM

## 2025-04-09 DIAGNOSIS — R10.9 LEFT SIDED ABDOMINAL PAIN: ICD-10-CM

## 2025-04-09 PROCEDURE — 74177 CT ABD & PELVIS W/CONTRAST: CPT

## 2025-04-09 RX ADMIN — IOHEXOL 100 ML: 350 INJECTION, SOLUTION INTRAVENOUS at 11:02

## 2025-04-10 ENCOUNTER — TELEPHONE (OUTPATIENT)
Dept: FAMILY MEDICINE CLINIC | Facility: CLINIC | Age: 70
End: 2025-04-10

## 2025-04-10 DIAGNOSIS — R93.89 THICKENED ENDOMETRIUM: Primary | ICD-10-CM

## 2025-04-10 DIAGNOSIS — N83.202 CYST OF LEFT OVARY: ICD-10-CM

## 2025-04-10 DIAGNOSIS — R10.9 LEFT SIDED ABDOMINAL PAIN: ICD-10-CM

## 2025-04-10 NOTE — TELEPHONE ENCOUNTER
I spoke with patient tonight regarding results of CT abdomen pelvis    Thickened endometrium, left ovarian cyst    No evidence of diverticulitis    Patient started FODMAP diet and will give it a try for 2 weeks    Referral to GYN and pelvic ultrasound placed.  Patient is agreeable to proceed    Patient will contact me if she is not able to see GYN in a timely manner within next few weeks

## 2025-04-15 ENCOUNTER — HOSPITAL ENCOUNTER (OUTPATIENT)
Dept: RADIOLOGY | Age: 70
Discharge: HOME | End: 2025-04-15
Attending: FAMILY MEDICINE
Payer: COMMERCIAL

## 2025-04-15 DIAGNOSIS — N83.202 CYST OF LEFT OVARY: ICD-10-CM

## 2025-04-15 DIAGNOSIS — R93.89 THICKENED ENDOMETRIUM: ICD-10-CM

## 2025-04-15 PROCEDURE — 76830 TRANSVAGINAL US NON-OB: CPT

## 2025-04-15 PROCEDURE — 76856 US EXAM PELVIC COMPLETE: CPT

## 2025-04-19 ENCOUNTER — TELEPHONE (OUTPATIENT)
Dept: OTHER | Facility: OTHER | Age: 70
End: 2025-04-19

## 2025-04-19 NOTE — TELEPHONE ENCOUNTER
"Patient stated, \"My ultrasound has not been resulted and I'm supposed to review them with my doctor on Monday at my appointment.\"    Please contact patient when office opens Monday morning. She prefers that the appointment be rescheduled if the results are not available to avoid paying a double copay. Also, please follow up on the delay of the ultrasound results.  Thank you.  "

## 2025-04-21 ENCOUNTER — OFFICE VISIT (OUTPATIENT)
Dept: OBGYN CLINIC | Facility: CLINIC | Age: 70
End: 2025-04-21
Payer: COMMERCIAL

## 2025-04-21 ENCOUNTER — TELEPHONE (OUTPATIENT)
Dept: OBGYN CLINIC | Facility: CLINIC | Age: 70
End: 2025-04-21

## 2025-04-21 VITALS
DIASTOLIC BLOOD PRESSURE: 100 MMHG | BODY MASS INDEX: 29.96 KG/M2 | HEIGHT: 66 IN | WEIGHT: 186.4 LBS | SYSTOLIC BLOOD PRESSURE: 156 MMHG

## 2025-04-21 DIAGNOSIS — R10.9 LEFT SIDED ABDOMINAL PAIN: ICD-10-CM

## 2025-04-21 DIAGNOSIS — K21.9 GASTROESOPHAGEAL REFLUX DISEASE WITHOUT ESOPHAGITIS: Chronic | ICD-10-CM

## 2025-04-21 DIAGNOSIS — I15.8 OTHER SECONDARY HYPERTENSION: ICD-10-CM

## 2025-04-21 DIAGNOSIS — R93.89 THICKENED ENDOMETRIUM: Primary | ICD-10-CM

## 2025-04-21 DIAGNOSIS — N83.202 CYST OF LEFT OVARY: ICD-10-CM

## 2025-04-21 DIAGNOSIS — Z86.000 HISTORY OF LOBULAR CARCINOMA IN SITU (LCIS) OF BREAST: ICD-10-CM

## 2025-04-21 PROCEDURE — 88305 TISSUE EXAM BY PATHOLOGIST: CPT | Performed by: PATHOLOGY

## 2025-04-21 PROCEDURE — 99213 OFFICE O/P EST LOW 20 MIN: CPT | Performed by: OBSTETRICS & GYNECOLOGY

## 2025-04-21 PROCEDURE — 58100 BIOPSY OF UTERUS LINING: CPT | Performed by: OBSTETRICS & GYNECOLOGY

## 2025-04-21 NOTE — TELEPHONE ENCOUNTER
Phone call to [facility] to get read US pelvis complete w transvaginal from date 04/15/2025 spoke with Matt byrd will add a rush to get ultrasound read before pts appt.

## 2025-04-21 NOTE — PROGRESS NOTES
Name: Kiesha Pathak      : 1955      MRN: [id number]  Encounter Provider: Kendy Ellis DO  Encounter Date: 2025   Encounter department: [facility]  :  Assessment & Plan  Thickened endometrium    Orders:    Ambulatory Referral to Obstetrics / Gynecology    Tissue Exam    Cyst of left ovary    Orders:    Ambulatory Referral to Obstetrics / Gynecology    Left sided abdominal pain    Orders:    Ambulatory Referral to Obstetrics / Gynecology    Other secondary hypertension         Gastroesophageal reflux disease without esophagitis         History of lobular carcinoma in situ (LCIS) of breast         Reviewed history and physical.  Implications of endometrial hypertrophy reviewed.  Discussed options including proceeding with endometrial biopsy for further evaluation.  Patient was agreeable and underwent endometrial biopsy without difficulty today.  Also discussed simple ovarian cyst, likely not source of pain but would recommend follow-up pelvic ultrasound in 3 months to assure stability.  All questions answered.  I will notify her of the above results via telephone.    History of Present Illness   HPI  Kiesha Pathak is a 69 y.o. female who presents     This is a pleasant 69-year-old female P2 ( x 2, age 46, 45) referred for an abnormal pelvic ultrasound.  She is accompanied with her  today.  Patient went through menopause at age 55.  She was on hormone replacement therapy up until 10/2018 when she developed breast cancer.  She denies any vaginal bleeding or spotting.  She developed right hip pain  And was evaluated by her chiropractor.  She states the discomfort was intermittent radiating to abdomen left side.  She underwent a CAT scan which had revealed thickened endometrial stripe.    Follow-up pelvic ultrasound revealed endometrial stripe of 10 mm, 2.3 cm simple left paraovarian cyst.    There has been no spotting or vaginal discharge.  No changes in bowel or  bladder function.     x 2 followed by postpartum tubal ligation    10/2018, LCIS, lumpectomy no further adjuvant therapy needed    Will be going on a road trip 2025      History obtained from: patient    Review of Systems   Constitutional:  Negative for fatigue, fever and unexpected weight change.   Respiratory:  Negative for cough, chest tightness, shortness of breath and wheezing.    Cardiovascular: Negative.  Negative for chest pain and palpitations.   Gastrointestinal: Negative.  Negative for abdominal distention, abdominal pain, blood in stool, constipation, diarrhea, nausea and vomiting.   Genitourinary: Negative.  Negative for difficulty urinating, dyspareunia, dysuria, flank pain, frequency, genital sores, hematuria, pelvic pain, urgency, vaginal bleeding, vaginal discharge and vaginal pain.   Skin:  Negative for rash.     Current Outpatient Medications on File Prior to Visit   Medication Sig Dispense Refill    Calcium-Magnesium-Vitamin D (CITRACAL CALCIUM+D PO) Take by mouth      Cholecalciferol (VITAMIN D) 2000 units CAPS Take 1 tablet by mouth daily      docusate sodium (COLACE) 100 mg capsule Take 1 capsule by mouth daily        fluocinonide (LIDEX) 0.05 % cream Apply topically 2 (two) times a day 30 g 2    fluticasone (FLONASE) 50 mcg/act nasal spray 2 sprays into each nostril daily 48 mL 3    KRILL OIL PO Take 1,600 mg by mouth 2 capsules daily      metoprolol succinate (TOPROL-XL) 50 mg 24 hr tablet Take 1 tablet (50 mg total) by mouth daily 90 tablet 3    Multiple Vitamins-Minerals (WOMENS 50+ MULTI VITAMIN/MIN PO) Take by mouth      Probiotic Product (PROBIOTIC BLEND PO) Take 1 capsule by mouth Daily at 2am      RABEprazole (ACIPHEX) 20 MG tablet Take 1 tablet (20 mg total) by mouth daily 90 tablet 3    ciclopirox (PENLAC) 8 % solution Apply to affected toenails once a day, remove once a week with alcohol, then start all over (Patient not taking: Reported on 2025) 6 mL 5    dicyclomine  "(BENTYL) 20 mg tablet Take 1 tablet (20 mg total) by mouth 2 (two) times a day as needed (Abdominal bloating/pain) (Patient not taking: Reported on 4/21/2025) 20 tablet 0    Lido-Menthol-Methyl Sal-Camph (CBD KINGS EX) Apply topically (Patient not taking: Reported on 4/21/2025)       No current facility-administered medications on file prior to visit.         Objective   /100   Ht 5' 6\" (1.676 m)   Wt 84.6 kg (186 lb 6.4 oz)   LMP  (LMP Unknown)   BMI 30.09 kg/m²      Physical Exam  Constitutional:       Appearance: Normal appearance.   Pulmonary:      Effort: Pulmonary effort is normal.   Abdominal:      General: Bowel sounds are normal. There is no distension.      Palpations: Abdomen is soft.      Tenderness: There is no abdominal tenderness. There is no guarding or rebound.   Genitourinary:     Labia:         Right: No rash, tenderness or lesion.         Left: No rash, tenderness or lesion.       Vagina: No signs of injury. No vaginal discharge or tenderness.      Cervix: No cervical motion tenderness, discharge, friability, lesion, erythema or cervical bleeding.      Uterus: Not enlarged and not tender.       Adnexa:         Right: No mass or tenderness.          Left: No mass or tenderness.     Neurological:      Mental Status: She is alert.     Endometrial biopsy    Date/Time: 4/21/2025 9:30 AM    Performed by: Kendy Ellis DO  Authorized by: Kendy Ellis DO  Universal Protocol:  Consent: Verbal consent obtained.  Consent given by: patient  Patient understanding: patient states understanding of the procedure being performed  Patient identity confirmed: verbally with patient    Procedure:     Procedure: endometrial biopsy with Pipelle      Cervix cleaned and prepped: yes      Uterus sounded: yes      Uterus sound depth (cm):  7    Specimen collected: specimen collected and sent to pathology      Patient tolerated procedure well with no complications: yes    Findings:     Uterus size:  6-8 " weeks    Cervix: normal      Adnexa: normal    Comments:     Procedure comments:  Cervix was visualized cleansed with Betadine solution.  The anterior lip of the cervix was grasped using an Allis clamp.  Cervical os was then dilated.  Endometrial Pipelle was inserted.  Uterus sounded to 7 cm.  3 passes were obtained with sufficient material retrieved.  All instruments removed from the vagina.  Patient tolerated the procedure well.        Administrative Statements   I have spent a total time of 40 minutes in caring for this patient on the day of the visit/encounter including Diagnostic results, Prognosis, Risks and benefits of tx options, Instructions for management, Impressions, Counseling / Coordination of care, Documenting in the medical record, Reviewing/placing orders in the medical record (including tests, medications, and/or procedures), and Obtaining or reviewing history  .

## 2025-04-23 ENCOUNTER — RESULTS FOLLOW-UP (OUTPATIENT)
Dept: FAMILY MEDICINE CLINIC | Facility: CLINIC | Age: 70
End: 2025-04-23

## 2025-04-24 PROCEDURE — 88305 TISSUE EXAM BY PATHOLOGIST: CPT | Performed by: PATHOLOGY

## 2025-04-29 ENCOUNTER — TELEPHONE (OUTPATIENT)
Dept: OBGYN CLINIC | Facility: CLINIC | Age: 70
End: 2025-04-29

## 2025-04-29 DIAGNOSIS — N83.202 CYST OF LEFT OVARY: ICD-10-CM

## 2025-04-29 DIAGNOSIS — R93.89 THICKENED ENDOMETRIUM: Primary | ICD-10-CM

## 2025-04-29 NOTE — TELEPHONE ENCOUNTER
Phone call to patient, reviewed endometrial biopsy revealing benign tissue.  Discussed options including D&C hysteroscopy versus follow-up ultrasound 3 months small ovarian cyst, endometrial stripe.  Patient is asymptomatic.  Will remain conservative.  I will place order for a pelvic ultrasound and call after follow-up ultrasound done.  All questions answered.

## 2025-06-02 ENCOUNTER — OFFICE VISIT (OUTPATIENT)
Dept: SURGICAL ONCOLOGY | Facility: CLINIC | Age: 70
End: 2025-06-02
Payer: COMMERCIAL

## 2025-06-02 VITALS
OXYGEN SATURATION: 99 % | DIASTOLIC BLOOD PRESSURE: 80 MMHG | HEART RATE: 50 BPM | BODY MASS INDEX: 30.09 KG/M2 | TEMPERATURE: 97.2 F | SYSTOLIC BLOOD PRESSURE: 140 MMHG | HEIGHT: 66 IN

## 2025-06-02 DIAGNOSIS — Z91.89 INCREASED RISK OF BREAST CANCER: ICD-10-CM

## 2025-06-02 DIAGNOSIS — Z86.000 HISTORY OF LOBULAR CARCINOMA IN SITU (LCIS) OF BREAST: Primary | ICD-10-CM

## 2025-06-02 PROCEDURE — 99213 OFFICE O/P EST LOW 20 MIN: CPT | Performed by: NURSE PRACTITIONER

## 2025-06-02 NOTE — PROGRESS NOTES
Name: Kiesha Pathak      : 1955      MRN: [id number]  Encounter Provider: PHILL Mauricio  Encounter Date: 2025   Encounter department: [facility] [place]  :  Assessment & Plan  History of lobular carcinoma in situ (LCIS) of breast         Increased risk of breast cancer         Patient is a 69-year-old female who presents today for a follow-up visit for right breast LCIS and ALH.  She underwent surgical excision by Dr. Moraes in 2018. She has declined chemoprevention.  We had been screening her with annual mammograms as well as annual breast MRIs but she has elected to discontinue breast MRI. Therefore, I am screening her with clinical breast exams every 6 months as well as annual 3D mammography.   She had a bilateral 3D screening mammogram in 2024 which was BI-RADS 2, category 1 density.  No worrisome findings on her clinical exam today.  She is already scheduled for her next mammogram.  I will plan to see her back in 1 year or sooner should the need arise.  She was instructed contact us with any changes or concerns in the interim.  All of her questions were answered today.        History of Present Illness   Kiesha Pathak is a 69 y.o. year old female who presents today for follow-up visit.  She has not appreciated any changes on self breast exam.  She had bilateral mammogram in September which was BI-RADS 2.  Reports no changes in her family history.       Review of Systems   Constitutional:  Negative for chills, fatigue and fever.   Respiratory:  Negative for cough and shortness of breath.    Cardiovascular:  Negative for chest pain.   Hematological:  Negative for adenopathy.   Psychiatric/Behavioral:  Negative for confusion.     A complete review of systems is negative other than that noted above in the HPI.           Objective   /80 (BP Location: Left arm, Patient Position: Sitting, Cuff Size: Large)   Pulse (!) 50    "Temp (!) 97.2 °F (36.2 °C) (Temporal)   Ht 5' 6\" (1.676 m)   LMP  (LMP Unknown)   SpO2 99%   BMI 30.09 kg/m²     Pain Screening:  Pain Score: 0-No pain  ECOG    Physical Exam  Constitutional:       Appearance: Normal appearance.   HENT:      Head: Normocephalic and atraumatic.   Pulmonary:      Effort: Pulmonary effort is normal.   Chest:   Breasts:     Right: No swelling, bleeding, inverted nipple, mass, nipple discharge, skin change (surgical scar) or tenderness.      Left: No swelling, bleeding, inverted nipple, mass, nipple discharge, skin change or tenderness.   Lymphadenopathy:      Upper Body:      Right upper body: No supraclavicular or axillary adenopathy.      Left upper body: No supraclavicular or axillary adenopathy.     Neurological:      General: No focal deficit present.      Mental Status: She is alert and oriented to person, place, and time.     Psychiatric:         Mood and Affect: Mood normal.        "

## 2025-06-10 ENCOUNTER — RA CDI HCC (OUTPATIENT)
Dept: OTHER | Facility: HOSPITAL | Age: 70
End: 2025-06-10

## 2025-06-16 ENCOUNTER — OFFICE VISIT (OUTPATIENT)
Dept: FAMILY MEDICINE CLINIC | Facility: CLINIC | Age: 70
End: 2025-06-16
Payer: COMMERCIAL

## 2025-06-16 VITALS
BODY MASS INDEX: 29.73 KG/M2 | TEMPERATURE: 98 F | RESPIRATION RATE: 16 BRPM | HEART RATE: 50 BPM | DIASTOLIC BLOOD PRESSURE: 86 MMHG | SYSTOLIC BLOOD PRESSURE: 120 MMHG | OXYGEN SATURATION: 99 % | WEIGHT: 185 LBS | HEIGHT: 66 IN

## 2025-06-16 DIAGNOSIS — I10 PRIMARY HYPERTENSION: ICD-10-CM

## 2025-06-16 DIAGNOSIS — R93.89 THICKENED ENDOMETRIUM: ICD-10-CM

## 2025-06-16 DIAGNOSIS — G89.29 CHRONIC MIDLINE LOW BACK PAIN WITH RIGHT-SIDED SCIATICA: ICD-10-CM

## 2025-06-16 DIAGNOSIS — K57.90 DIVERTICULAR DISEASE: ICD-10-CM

## 2025-06-16 DIAGNOSIS — M54.41 CHRONIC MIDLINE LOW BACK PAIN WITH RIGHT-SIDED SCIATICA: ICD-10-CM

## 2025-06-16 DIAGNOSIS — K21.9 GASTROESOPHAGEAL REFLUX DISEASE WITHOUT ESOPHAGITIS: Chronic | ICD-10-CM

## 2025-06-16 DIAGNOSIS — E78.2 MIXED HYPERLIPIDEMIA: Primary | ICD-10-CM

## 2025-06-16 PROCEDURE — 99214 OFFICE O/P EST MOD 30 MIN: CPT | Performed by: FAMILY MEDICINE

## 2025-06-16 PROCEDURE — G2211 COMPLEX E/M VISIT ADD ON: HCPCS | Performed by: FAMILY MEDICINE

## 2025-06-16 NOTE — ASSESSMENT & PLAN NOTE
Continue lab monitoring.  Orders:  •  CBC and differential; Future  •  Comprehensive metabolic panel; Future  •  Lipid Panel with Direct LDL reflex; Future  •  TSH, 3rd generation; Future

## 2025-06-16 NOTE — PROGRESS NOTES
Name: Kiesha Pathak      : 1955      MRN: [id number]  Encounter Provider: Soila Valdes MD  Encounter Date: 2025   Encounter department: [facility]    Assessment & Plan  Mixed hyperlipidemia  Continue lab monitoring.  Orders:  •  CBC and differential; Future  •  Comprehensive metabolic panel; Future  •  Lipid Panel with Direct LDL reflex; Future  •  TSH, 3rd generation; Future    Primary hypertension  Blood pressure is well-controlled  Metoprolol ER 50 mg daily       Thickened endometrium  Negative EMB in April.  Patient is under care of GYN  Pending follow-up ultrasound 2025       Chronic midline low back pain with right-sided sciatica  Patient is under care of chiropractor.  So far had 6 treatments.  Partial improvement.  X-ray lumbar spine reveals multilevel disc disease.  Continue supportive care.  If back pain persist-consider MRI       Gastroesophageal reflux disease without esophagitis  Continue PPI therapy.  Patient may skip Rx few times per week as long as no breakthrough symptoms       Diverticular disease  Left lower quadrant pain has resolved.  Patient reports success with FODMAP diet.  She is currently using digestive enzymes.  Last colonoscopy in , due in           Patient Instructions   Try to skip rabeprazole once or twice a week for 4 to 6 weeks.  If you are doing well you may try to take it every other day and then use it as needed.  If reflux symptoms worsen you should restart it once a day at any time  Try Voltaren gel for left ankle for the next 2 weeks, twice a day  If back pain persists -we will proceed with MRI    Return in about 6 months (around 2025).    History of Present Illness     Follow-up   Abdominal pain has resolved after trial of FODMAP diet.  Patient used diet for couple weeks.  Currently uses digestive enzymes    CT AP 2025  IMPRESSION:No acute intra-abdominal pathology.  No evidence of diverticulosis coli or  "diverticulitis.  Prominent endometrium for postmenopausal state, 8 mm. Unilocular left adnexal 2.3 cm cyst. For these findings, recommend correlation with transabdominal and transvaginal ultrasound.    Patient had subsequent pelvic ultrasound and consultation with GYN, Dr. Ellis.  Negative EMB in April.  Patient will be repeating ultrasound in late July as per GYN recommendations    Chronic low back pain.  Improved after 6 chiropractic treatments with Dr. Flood, chiropractor.    Sprained left ankle in early May, was able to walk/bear weight after sprain.  Currently swelling has significantly improved, full range of motion     Patient sabine under care of GYN oncology, recent visit with breast exam.      Review of Systems   Constitutional: Negative.    HENT: Negative.     Respiratory: Negative.     Cardiovascular: Negative.    Gastrointestinal: Negative.    Musculoskeletal:  Positive for arthralgias and back pain.   Neurological: Negative.      Past Medical History[1]  Past Surgical History[2]  Family History[3]  Social History[4]  Medications[5]  Allergies   Allergen Reactions   • Dyazide [Hydrochlorothiazide-Triamterene] Other (See Comments)     myalgias   • Ace Inhibitors Cough   • Adhesive [Medical Tape] Itching     Immunization History   Administered Date(s) Administered   • INFLUENZA 09/15/2014, 11/14/2016   • Influenza Quadrivalent Preservative Free 3 years and older IM 11/14/2016, 09/16/2017   • Influenza, injectable, quadrivalent, preservative free 0.5 mL 10/26/2019, 09/12/2020   • Influenza, recombinant, quadrivalent,injectable, preservative free 09/06/2018   • Influenza, seasonal, injectable 10/09/2013, 09/15/2014, 10/02/2015     Objective   /86 (BP Location: Left arm, Patient Position: Sitting, Cuff Size: Large)   Pulse (!) 50   Temp 98 °F (36.7 °C) (Temporal)   Resp 16   Ht 5' 6\" (1.676 m)   Wt 83.9 kg (185 lb)   LMP  (LMP Unknown)   SpO2 99%   BMI 29.86 kg/m²     Physical Exam  Vitals " and nursing note reviewed.   Constitutional:       General: She is not in acute distress.     Appearance: Normal appearance. She is well-developed. She is not ill-appearing.   HENT:      Head: Normocephalic and atraumatic.     Eyes:      Conjunctiva/sclera: Conjunctivae normal.     Neck:      Thyroid: No thyromegaly.      Vascular: No carotid bruit.     Cardiovascular:      Rate and Rhythm: Normal rate and regular rhythm.      Heart sounds: Normal heart sounds. No murmur heard.  Pulmonary:      Effort: Pulmonary effort is normal. No respiratory distress.      Breath sounds: Normal breath sounds. No wheezing.   Abdominal:      General: There is no abdominal bruit.     Musculoskeletal:         General: Normal range of motion.      Cervical back: Neck supple.     Neurological:      General: No focal deficit present.      Mental Status: She is alert and oriented to person, place, and time.      Cranial Nerves: No cranial nerve deficit.      Coordination: Coordination normal.     Psychiatric:         Mood and Affect: Mood normal.         Behavior: Behavior normal.                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 Laurent   • COLONOSCOPY  2019   • ESOPHAGOGASTRIC FUNDOPLASTY      Nissen fundoplication   • ESOPHAGOGASTRODUODENOSCOPY     • FOOT SURGERY  Left     plantar fascia    • FUNDOPLICATION TRANSORAL     • GYNECOLOGIC CRYOSURGERY     • HERNIA REPAIR     • MAMMO (HISTORICAL) Bilateral 2018    check right   • MAMMO NEEDLE LOCALIZATION RIGHT (ALL INC) Right 10/23/2018   • MAMMO STEREOTACTIC BREAST BIOPSY RIGHT (ALL INC) Right 2018   • POLYPECTOMY  2008    ulcerated polypectomy    • LA PERQ DEVICE PLACEMENT BREAST LOC 1ST LES W/GDNCE Right 10/23/2018    Procedure: BREAST LUMPECTOMY; BREAST NEEDLE LOCATION (NEEDLE LOC AT 0830) ;  Surgeon: Dennis Moraes MD;  Location: [facility];  Service: Surgical Oncology   • TONSILLECTOMY AND ADENOIDECTOMY     • TUBAL LIGATION     • VAGINAL DELIVERY      ,    [3]  Family History  Problem Relation Name Age of Onset   • Diabetes Mother     • Heart disease Mother     • Osteoporosis Mother     • Hypertension Mother     • Arthritis Mother     • Heart disease Father     • Skin cancer Father     • Hyperlipidemia Father     • Arthritis Father     • Hearing loss Father     • No Known Problems Sister Lexus    • No Known Problems Daughter Shyann    • Breast cancer Maternal Grandmother          unknonw age   • No Known Problems Maternal Grandfather     • Cervical cancer Paternal Grandmother          unknown age   • No Known Problems Paternal Grandfather     • No Known Problems Maternal Aunt Norma    • No Known Problems Maternal Aunt     • No Known Problems Maternal Aunt     • Breast cancer Paternal Aunt          unknown age   • No Known Problems Paternal Aunt Landy    • No Known Problems Paternal Aunt dennis    • Cancer Family     [4]  Social History  Tobacco Use   • Smoking status: Never   • Smokeless tobacco: Never   Vaping Use   • Vaping status: Never Used   Substance and Sexual Activity   • Alcohol use: Yes     Alcohol/week: 1.0 standard drink of alcohol     Types: 1 Glasses of wine per week     Comment: Social  1 per/day   • Drug use:  No   • Sexual activity: Not Currently     Partners: Male     Birth control/protection: Post-menopausal   [5]  Current Outpatient Medications on File Prior to Visit   Medication Sig   • Calcium-Magnesium-Vitamin D (CITRACAL CALCIUM+D PO) Take by mouth   • Cholecalciferol (VITAMIN D) 2000 units CAPS Take 1 tablet by mouth in the morning.   • docusate sodium (COLACE) 100 mg capsule Take 1 capsule by mouth in the morning.   • fluocinonide (LIDEX) 0.05 % cream Apply topically 2 (two) times a day   • fluticasone (FLONASE) 50 mcg/act nasal spray 2 sprays into each nostril daily   • KRILL OIL PO Take 1,600 mg by mouth 2 capsules daily   • metoprolol succinate (TOPROL-XL) 50 mg 24 hr tablet Take 1 tablet (50 mg total) by mouth daily   • Multiple Vitamins-Minerals (WOMENS 50+ MULTI VITAMIN/MIN PO) Take by mouth   • Probiotic Product (PROBIOTIC BLEND PO) Take 1 capsule by mouth Daily at 2am   • RABEprazole (ACIPHEX) 20 MG tablet Take 1 tablet (20 mg total) by mouth daily   • dicyclomine (BENTYL) 20 mg tablet Take 1 tablet (20 mg total) by mouth 2 (two) times a day as needed (Abdominal bloating/pain) (Patient not taking: Reported on 4/21/2025)

## 2025-06-16 NOTE — PATIENT INSTRUCTIONS
Try to skip rabeprazole once or twice a week for 4 to 6 weeks.  If you are doing well you may try to take it every other day and then use it as needed.  If reflux symptoms worsen you should restart it once a day at any time  Try Voltaren gel for left ankle for the next 2 weeks, twice a day  If back pain persists -we will proceed with MRI

## 2025-06-19 PROBLEM — K57.90 DIVERTICULAR DISEASE: Status: ACTIVE | Noted: 2025-06-19

## 2025-06-20 NOTE — ASSESSMENT & PLAN NOTE
Patient is under care of chiropractor.  So far had 6 treatments.  Partial improvement.  X-ray lumbar spine reveals multilevel disc disease.  Continue supportive care.  If back pain persist-consider MRI

## 2025-06-20 NOTE — ASSESSMENT & PLAN NOTE
Continue PPI therapy.  Patient may skip Rx few times per week as long as no breakthrough symptoms

## 2025-06-20 NOTE — ASSESSMENT & PLAN NOTE
Left lower quadrant pain has resolved.  Patient reports success with FODMAP diet.  She is currently using digestive enzymes.  Last colonoscopy in 2022, due in 2027

## 2025-07-29 ENCOUNTER — HOSPITAL ENCOUNTER (OUTPATIENT)
Dept: RADIOLOGY | Age: 70
Discharge: HOME | End: 2025-07-29
Attending: OBSTETRICS & GYNECOLOGY
Payer: COMMERCIAL

## 2025-07-29 DIAGNOSIS — R93.89 THICKENED ENDOMETRIUM: ICD-10-CM

## 2025-07-29 DIAGNOSIS — N83.202 CYST OF LEFT OVARY: ICD-10-CM

## 2025-07-29 PROCEDURE — 76830 TRANSVAGINAL US NON-OB: CPT

## 2025-07-29 PROCEDURE — 76856 US EXAM PELVIC COMPLETE: CPT

## 2025-08-07 ENCOUNTER — TELEPHONE (OUTPATIENT)
Dept: OBGYN CLINIC | Facility: CLINIC | Age: 70
End: 2025-08-07

## (undated) DEVICE — CHLORAPREP HI-LITE 26ML ORANGE

## (undated) DEVICE — LIGHT HANDLE COVER SLEEVE DISP BLUE STELLAR

## (undated) DEVICE — 3M™ STERI-STRIP™ REINFORCED ADHESIVE SKIN CLOSURES, R1547, 1/2 IN X 4 IN (12 MM X 100 MM), 6 STRIPS/ENVELOPE: Brand: 3M™ STERI-STRIP™

## (undated) DEVICE — SMOKE EVACUATION TUBING WITH 8 IN INTEGRAL WAND AND SPONGE GUARD: Brand: BUFFALO FILTER

## (undated) DEVICE — SUT MONOCRYL 4-0 PS-2 18 IN Y496G

## (undated) DEVICE — MEDI-VAC YANK SUCT HNDL W/TPRD BULBOUS TIP: Brand: CARDINAL HEALTH

## (undated) DEVICE — TUBING SUCTION 5MM X 12 FT

## (undated) DEVICE — NEEDLE 25G X 1 1/2

## (undated) DEVICE — SUT VICRYL 3-0 SH 27 IN J416H

## (undated) DEVICE — INTENDED FOR TISSUE SEPARATION, AND OTHER PROCEDURES THAT REQUIRE A SHARP SURGICAL BLADE TO PUNCTURE OR CUT.: Brand: BARD-PARKER SAFETY BLADES SIZE 15, STERILE

## (undated) DEVICE — PAD GROUNDING ADULT

## (undated) DEVICE — VIAL DECANTER

## (undated) DEVICE — DRAPE PROBE NEO-PROBE/ULTRASOUND

## (undated) DEVICE — DRAPE EQUIPMENT RF WAND

## (undated) DEVICE — BETHLEHEM UNIVERSAL MINOR GEN: Brand: CARDINAL HEALTH

## (undated) DEVICE — MARGIN MARKER SET

## (undated) DEVICE — PENCIL ELECTROSURG E-Z CLEAN -0035H

## (undated) DEVICE — GLOVE SRG BIOGEL 7